# Patient Record
Sex: FEMALE | Race: WHITE | Employment: FULL TIME | URBAN - METROPOLITAN AREA
[De-identification: names, ages, dates, MRNs, and addresses within clinical notes are randomized per-mention and may not be internally consistent; named-entity substitution may affect disease eponyms.]

---

## 2017-02-02 RX ORDER — BUPROPION HYDROCHLORIDE 150 MG/1
150 TABLET ORAL EVERY MORNING
Qty: 30 TABLET | Refills: 3 | Status: SHIPPED | OUTPATIENT
Start: 2017-02-02 | End: 2017-05-30

## 2017-03-30 ENCOUNTER — OFFICE VISIT (OUTPATIENT)
Dept: FAMILY MEDICINE CLINIC | Age: 22
End: 2017-03-30

## 2017-03-30 VITALS
HEART RATE: 100 BPM | DIASTOLIC BLOOD PRESSURE: 72 MMHG | SYSTOLIC BLOOD PRESSURE: 120 MMHG | HEIGHT: 64 IN | TEMPERATURE: 98.5 F | BODY MASS INDEX: 31.07 KG/M2 | WEIGHT: 182 LBS | OXYGEN SATURATION: 98 %

## 2017-03-30 DIAGNOSIS — M54.6 CHRONIC MIDLINE THORACIC BACK PAIN: ICD-10-CM

## 2017-03-30 DIAGNOSIS — F32.A ANXIETY AND DEPRESSION: ICD-10-CM

## 2017-03-30 DIAGNOSIS — G89.29 CHRONIC MIDLINE THORACIC BACK PAIN: ICD-10-CM

## 2017-03-30 DIAGNOSIS — F41.9 ANXIETY AND DEPRESSION: ICD-10-CM

## 2017-03-30 PROCEDURE — 99213 OFFICE O/P EST LOW 20 MIN: CPT | Performed by: NURSE PRACTITIONER

## 2017-03-30 RX ORDER — METHOCARBAMOL 500 MG/1
500 TABLET, FILM COATED ORAL 3 TIMES DAILY PRN
Qty: 15 TABLET | Refills: 0 | Status: SHIPPED | OUTPATIENT
Start: 2017-03-30 | End: 2017-04-14

## 2017-03-30 RX ORDER — FLUOXETINE HYDROCHLORIDE 20 MG/1
20 CAPSULE ORAL DAILY
Qty: 30 CAPSULE | Refills: 3 | Status: SHIPPED | OUTPATIENT
Start: 2017-03-30 | End: 2017-05-30 | Stop reason: SDUPTHER

## 2017-03-30 RX ORDER — ACETAMINOPHEN AND CODEINE PHOSPHATE 120; 12 MG/5ML; MG/5ML
0.35 SOLUTION ORAL DAILY
COMMUNITY
Start: 2017-02-22 | End: 2019-03-26

## 2017-03-30 RX ORDER — METHYLPREDNISOLONE 4 MG/1
TABLET ORAL
Qty: 1 KIT | Refills: 0 | Status: SHIPPED | OUTPATIENT
Start: 2017-03-30 | End: 2017-03-30

## 2017-04-02 ASSESSMENT — ENCOUNTER SYMPTOMS
DIARRHEA: 0
VOMITING: 0
NAUSEA: 0
BACK PAIN: 1
CHEST TIGHTNESS: 0
ABDOMINAL PAIN: 0
SHORTNESS OF BREATH: 0

## 2017-05-30 ENCOUNTER — OFFICE VISIT (OUTPATIENT)
Dept: FAMILY MEDICINE CLINIC | Age: 22
End: 2017-05-30

## 2017-05-30 VITALS
SYSTOLIC BLOOD PRESSURE: 118 MMHG | BODY MASS INDEX: 30.56 KG/M2 | HEART RATE: 68 BPM | HEIGHT: 64 IN | WEIGHT: 179 LBS | OXYGEN SATURATION: 97 % | DIASTOLIC BLOOD PRESSURE: 70 MMHG

## 2017-05-30 DIAGNOSIS — F41.9 ANXIETY AND DEPRESSION: ICD-10-CM

## 2017-05-30 DIAGNOSIS — F32.A ANXIETY AND DEPRESSION: ICD-10-CM

## 2017-05-30 DIAGNOSIS — G47.00 INSOMNIA, UNSPECIFIED TYPE: ICD-10-CM

## 2017-05-30 PROCEDURE — 99213 OFFICE O/P EST LOW 20 MIN: CPT | Performed by: NURSE PRACTITIONER

## 2017-05-30 RX ORDER — VALACYCLOVIR HYDROCHLORIDE 1 G/1
1000 TABLET, FILM COATED ORAL 2 TIMES DAILY
COMMUNITY
End: 2018-10-18 | Stop reason: SDUPTHER

## 2017-05-30 RX ORDER — FLUOXETINE HYDROCHLORIDE 20 MG/1
20 CAPSULE ORAL DAILY
Qty: 30 CAPSULE | Refills: 5 | Status: SHIPPED | OUTPATIENT
Start: 2017-05-30 | End: 2017-11-30 | Stop reason: SDUPTHER

## 2017-05-30 RX ORDER — TRAZODONE HYDROCHLORIDE 50 MG/1
50 TABLET ORAL NIGHTLY
Qty: 30 TABLET | Refills: 5 | Status: SHIPPED | OUTPATIENT
Start: 2017-05-30 | End: 2018-03-13 | Stop reason: SDUPTHER

## 2017-05-31 ASSESSMENT — ENCOUNTER SYMPTOMS
CHEST TIGHTNESS: 0
GASTROINTESTINAL NEGATIVE: 1
SHORTNESS OF BREATH: 0

## 2017-09-08 ENCOUNTER — OFFICE VISIT (OUTPATIENT)
Dept: FAMILY MEDICINE CLINIC | Age: 22
End: 2017-09-08

## 2017-09-08 VITALS
BODY MASS INDEX: 31.51 KG/M2 | DIASTOLIC BLOOD PRESSURE: 70 MMHG | WEIGHT: 185 LBS | SYSTOLIC BLOOD PRESSURE: 118 MMHG | RESPIRATION RATE: 18 BRPM | HEART RATE: 98 BPM | TEMPERATURE: 98.4 F | OXYGEN SATURATION: 98 %

## 2017-09-08 DIAGNOSIS — J06.9 VIRAL URI WITH COUGH: ICD-10-CM

## 2017-09-08 PROCEDURE — 99212 OFFICE O/P EST SF 10 MIN: CPT | Performed by: NURSE PRACTITIONER

## 2017-09-08 RX ORDER — BENZONATATE 200 MG/1
200 CAPSULE ORAL 3 TIMES DAILY PRN
Qty: 30 CAPSULE | Refills: 0 | Status: SHIPPED | OUTPATIENT
Start: 2017-09-08 | End: 2017-09-15

## 2017-09-10 ASSESSMENT — ENCOUNTER SYMPTOMS
COUGH: 1
SINUS PRESSURE: 0
VOMITING: 0
NAUSEA: 0
SHORTNESS OF BREATH: 0
DIARRHEA: 0
SORE THROAT: 1
CHEST TIGHTNESS: 0
ABDOMINAL PAIN: 0
WHEEZING: 0

## 2017-11-30 ENCOUNTER — OFFICE VISIT (OUTPATIENT)
Dept: FAMILY MEDICINE CLINIC | Age: 22
End: 2017-11-30

## 2017-11-30 VITALS
WEIGHT: 190 LBS | SYSTOLIC BLOOD PRESSURE: 118 MMHG | DIASTOLIC BLOOD PRESSURE: 72 MMHG | OXYGEN SATURATION: 98 % | RESPIRATION RATE: 16 BRPM | HEART RATE: 80 BPM | BODY MASS INDEX: 32.44 KG/M2 | HEIGHT: 64 IN

## 2017-11-30 DIAGNOSIS — F41.9 ANXIETY AND DEPRESSION: ICD-10-CM

## 2017-11-30 DIAGNOSIS — G47.00 INSOMNIA, UNSPECIFIED TYPE: ICD-10-CM

## 2017-11-30 DIAGNOSIS — F32.A ANXIETY AND DEPRESSION: ICD-10-CM

## 2017-11-30 DIAGNOSIS — Z23 NEED FOR INFLUENZA VACCINATION: ICD-10-CM

## 2017-11-30 PROCEDURE — 90686 IIV4 VACC NO PRSV 0.5 ML IM: CPT | Performed by: NURSE PRACTITIONER

## 2017-11-30 PROCEDURE — 99213 OFFICE O/P EST LOW 20 MIN: CPT | Performed by: NURSE PRACTITIONER

## 2017-11-30 PROCEDURE — 90471 IMMUNIZATION ADMIN: CPT | Performed by: NURSE PRACTITIONER

## 2017-11-30 RX ORDER — FLUOXETINE HYDROCHLORIDE 40 MG/1
40 CAPSULE ORAL DAILY
Qty: 30 CAPSULE | Refills: 2 | Status: SHIPPED | OUTPATIENT
Start: 2017-11-30 | End: 2018-03-13 | Stop reason: SDUPTHER

## 2017-11-30 NOTE — PROGRESS NOTES
Nathalie Pace 23 y.o. female    Chief Complaint   Patient presents with    Depression    Anxiety       HPI     Depression, anxiety, insomnia follow-up. On Prozac 20mg daily, has been feeling ok until lately (recent split up from her boyfriend, gandpa battleling cancer). Insomnia better, taking trazodone only once or so a week  Would like a flu vaccine. Past medical, surgical, family and social history were reviewed and updated with the patient. Current Outpatient Prescriptions:     FLUoxetine (PROZAC) 20 MG capsule, Take 1 capsule by mouth daily, Disp: 30 capsule, Rfl: 2    norethindrone (ORTHO MICRONOR) 0.35 MG tablet, Take 0.35 mg by mouth daily, Disp: , Rfl:     valACYclovir (VALTREX) 1 G tablet, Take 1,000 mg by mouth 2 times daily, Disp: , Rfl:     traZODone (DESYREL) 50 MG tablet, Take 1 tablet by mouth nightly, Disp: 30 tablet, Rfl: 5    fluticasone (FLONASE) 50 MCG/ACT nasal spray, 2 sprays by Nasal route daily Each nostril, Disp: 1 Bottle, Rfl: 11    fexofenadine (ALLEGRA) 180 MG tablet, Take 1 tablet by mouth daily, Disp: 30 tablet, Rfl: 1    Review of Systems   Psychiatric/Behavioral: Positive for depression. Negative for substance abuse and suicidal ideas. The patient is nervous/anxious. The patient does not have insomnia. Physical Exam   Constitutional: She is oriented to person, place, and time. She appears well-developed and well-nourished. Neurological: She is alert and oriented to person, place, and time. Psychiatric: She has a normal mood and affect. Her behavior is normal. Thought content normal.   Nursing note and vitals reviewed. Vitals:    11/30/17 1252   BP: 118/72   Pulse: 80   Resp: 16   SpO2: 98%       Assessment    1. Anxiety and depression    2. Insomnia, unspecified type    3. Need for influenza vaccination        Plan    Marga Ness was seen today for depression and anxiety.     Diagnoses and all orders for this visit:    Anxiety and depression  - increase Prozac to 40mg, email me in  3-4 weeks, OV in 3 months  Insomnia, unspecified type  - continue trazodone as needed at night   Need for influenza vaccination  -     INFLUENZA, QUADV, 3 YRS AND OLDER, IM, PF, PREFILL SYR OR SDV, 0.5ML (FLUZONE QUADV, PF)    Other orders  -     FLUoxetine (PROZAC) 40 MG capsule;  Take 1 capsule by mouth daily

## 2018-02-26 ENCOUNTER — OFFICE VISIT (OUTPATIENT)
Dept: FAMILY MEDICINE CLINIC | Age: 23
End: 2018-02-26

## 2018-02-26 VITALS
OXYGEN SATURATION: 97 % | SYSTOLIC BLOOD PRESSURE: 120 MMHG | WEIGHT: 205 LBS | HEART RATE: 66 BPM | BODY MASS INDEX: 34.16 KG/M2 | DIASTOLIC BLOOD PRESSURE: 82 MMHG | TEMPERATURE: 98.6 F | HEIGHT: 65 IN

## 2018-02-26 DIAGNOSIS — K08.89 DENTALGIA: ICD-10-CM

## 2018-02-26 DIAGNOSIS — F32.A ANXIETY AND DEPRESSION: ICD-10-CM

## 2018-02-26 DIAGNOSIS — Z87.891 FORMER SMOKER: ICD-10-CM

## 2018-02-26 DIAGNOSIS — F41.9 ANXIETY AND DEPRESSION: ICD-10-CM

## 2018-02-26 DIAGNOSIS — G47.00 INSOMNIA, UNSPECIFIED TYPE: ICD-10-CM

## 2018-02-26 DIAGNOSIS — Z00.00 PHYSICAL EXAM: ICD-10-CM

## 2018-02-26 LAB
A/G RATIO: 2.4 (ref 1.1–2.2)
ALBUMIN SERPL-MCNC: 4.6 G/DL (ref 3.4–5)
ALP BLD-CCNC: 57 U/L (ref 40–129)
ALT SERPL-CCNC: 16 U/L (ref 10–40)
ANION GAP SERPL CALCULATED.3IONS-SCNC: 13 MMOL/L (ref 3–16)
AST SERPL-CCNC: 13 U/L (ref 15–37)
BASOPHILS ABSOLUTE: 0 K/UL (ref 0–0.2)
BASOPHILS RELATIVE PERCENT: 0.4 %
BILIRUB SERPL-MCNC: 0.3 MG/DL (ref 0–1)
BUN BLDV-MCNC: 14 MG/DL (ref 7–20)
CALCIUM SERPL-MCNC: 8.6 MG/DL (ref 8.3–10.6)
CHLORIDE BLD-SCNC: 106 MMOL/L (ref 99–110)
CHOLESTEROL, TOTAL: 144 MG/DL (ref 0–199)
CO2: 23 MMOL/L (ref 21–32)
CREAT SERPL-MCNC: 0.6 MG/DL (ref 0.6–1.1)
EOSINOPHILS ABSOLUTE: 0.1 K/UL (ref 0–0.6)
EOSINOPHILS RELATIVE PERCENT: 1.7 %
GFR AFRICAN AMERICAN: >60
GFR NON-AFRICAN AMERICAN: >60
GLOBULIN: 1.9 G/DL
GLUCOSE BLD-MCNC: 93 MG/DL (ref 70–99)
HCT VFR BLD CALC: 37.5 % (ref 36–48)
HDLC SERPL-MCNC: 53 MG/DL (ref 40–60)
HEMOGLOBIN: 13.3 G/DL (ref 12–16)
LDL CHOLESTEROL CALCULATED: 79 MG/DL
LYMPHOCYTES ABSOLUTE: 1.7 K/UL (ref 1–5.1)
LYMPHOCYTES RELATIVE PERCENT: 33.3 %
MCH RBC QN AUTO: 31.7 PG (ref 26–34)
MCHC RBC AUTO-ENTMCNC: 35.4 G/DL (ref 31–36)
MCV RBC AUTO: 89.4 FL (ref 80–100)
MONOCYTES ABSOLUTE: 0.2 K/UL (ref 0–1.3)
MONOCYTES RELATIVE PERCENT: 4.2 %
NEUTROPHILS ABSOLUTE: 3.1 K/UL (ref 1.7–7.7)
NEUTROPHILS RELATIVE PERCENT: 60.4 %
PDW BLD-RTO: 12.4 % (ref 12.4–15.4)
PLATELET # BLD: 196 K/UL (ref 135–450)
PMV BLD AUTO: 7.8 FL (ref 5–10.5)
POTASSIUM SERPL-SCNC: 4.8 MMOL/L (ref 3.5–5.1)
RBC # BLD: 4.19 M/UL (ref 4–5.2)
SODIUM BLD-SCNC: 142 MMOL/L (ref 136–145)
TOTAL PROTEIN: 6.5 G/DL (ref 6.4–8.2)
TRIGL SERPL-MCNC: 61 MG/DL (ref 0–150)
TSH SERPL DL<=0.05 MIU/L-ACNC: 0.64 UIU/ML (ref 0.27–4.2)
VLDLC SERPL CALC-MCNC: 12 MG/DL
WBC # BLD: 5.1 K/UL (ref 4–11)

## 2018-02-26 PROCEDURE — G0444 DEPRESSION SCREEN ANNUAL: HCPCS | Performed by: NURSE PRACTITIONER

## 2018-02-26 PROCEDURE — 99395 PREV VISIT EST AGE 18-39: CPT | Performed by: NURSE PRACTITIONER

## 2018-02-26 RX ORDER — AMOXICILLIN 500 MG/1
500 CAPSULE ORAL 3 TIMES DAILY
Qty: 21 CAPSULE | Refills: 0 | Status: SHIPPED | OUTPATIENT
Start: 2018-02-26 | End: 2018-03-05

## 2018-02-26 ASSESSMENT — PATIENT HEALTH QUESTIONNAIRE - PHQ9
4. FEELING TIRED OR HAVING LITTLE ENERGY: 3
2. FEELING DOWN, DEPRESSED OR HOPELESS: 1
SUM OF ALL RESPONSES TO PHQ9 QUESTIONS 1 & 2: 3
1. LITTLE INTEREST OR PLEASURE IN DOING THINGS: 2
5. POOR APPETITE OR OVEREATING: 3
3. TROUBLE FALLING OR STAYING ASLEEP: 3
6. FEELING BAD ABOUT YOURSELF - OR THAT YOU ARE A FAILURE OR HAVE LET YOURSELF OR YOUR FAMILY DOWN: 1
10. IF YOU CHECKED OFF ANY PROBLEMS, HOW DIFFICULT HAVE THESE PROBLEMS MADE IT FOR YOU TO DO YOUR WORK, TAKE CARE OF THINGS AT HOME, OR GET ALONG WITH OTHER PEOPLE: 3
9. THOUGHTS THAT YOU WOULD BE BETTER OFF DEAD, OR OF HURTING YOURSELF: 0
7. TROUBLE CONCENTRATING ON THINGS, SUCH AS READING THE NEWSPAPER OR WATCHING TELEVISION: 1
8. MOVING OR SPEAKING SO SLOWLY THAT OTHER PEOPLE COULD HAVE NOTICED. OR THE OPPOSITE, BEING SO FIGETY OR RESTLESS THAT YOU HAVE BEEN MOVING AROUND A LOT MORE THAN USUAL: 0
SUM OF ALL RESPONSES TO PHQ QUESTIONS 1-9: 14

## 2018-02-26 NOTE — PROGRESS NOTES
Allergies  Outpatient Prescriptions Marked as Taking for the 2/26/18 encounter (Office Visit) with Jeff Mott NP   Medication Sig Dispense Refill    FLUoxetine (PROZAC) 40 MG capsule Take 1 capsule by mouth daily 30 capsule 2    valACYclovir (VALTREX) 1 G tablet Take 1,000 mg by mouth 2 times daily      traZODone (DESYREL) 50 MG tablet Take 1 tablet by mouth nightly 30 tablet 5    norethindrone (ORTHO MICRONOR) 0.35 MG tablet Take 0.35 mg by mouth daily      fexofenadine (ALLEGRA) 180 MG tablet Take 1 tablet by mouth daily 30 tablet 1       Past Medical History:   Diagnosis Date    Fx one rib-open     LEFT SIDE    PTSD (post-traumatic stress disorder)      Past Surgical History:   Procedure Laterality Date    MOUTH SURGERY      CYST    TONSILLECTOMY       Family History   Problem Relation Age of Onset    Cancer Mother 39     uterine ca    Migraines Mother     High Blood Pressure Father     High Cholesterol Father    Blinda Lull Migraines Sister     Diabetes Maternal Grandmother     Celiac Disease Maternal Grandmother     Cancer Maternal Grandfather      bladder ca with mets     Social History     Social History    Marital status: Single     Spouse name: N/A    Number of children: N/A    Years of education: N/A     Occupational History    Not on file. Social History Main Topics    Smoking status: Former Smoker     Packs/day: 0.50     Years: 6.00     Quit date: 1/22/2018    Smokeless tobacco: Never Used      Comment: down 4-5 cig a day    Alcohol use Yes    Drug use: Unknown    Sexual activity: Not on file     Other Topics Concern    Not on file     Social History Narrative    No narrative on file       Review of Systems:  A comprehensive review of systems was negative except for what was noted in the HPI.      Physical Exam:   Vitals:    02/26/18 0901   BP: 120/82   Site: Right Arm   Position: Sitting   Cuff Size: Small Adult   Pulse: 66   Temp: 98.6 °F (37 °C)   TempSrc: Oral   SpO2: 97%

## 2018-03-13 RX ORDER — FLUOXETINE HYDROCHLORIDE 40 MG/1
40 CAPSULE ORAL DAILY
Qty: 30 CAPSULE | Refills: 5 | Status: SHIPPED | OUTPATIENT
Start: 2018-03-13 | End: 2018-04-16 | Stop reason: SDUPTHER

## 2018-03-13 RX ORDER — TRAZODONE HYDROCHLORIDE 50 MG/1
50 TABLET ORAL NIGHTLY
Qty: 30 TABLET | Refills: 5 | Status: SHIPPED | OUTPATIENT
Start: 2018-03-13 | End: 2018-04-16 | Stop reason: SDUPTHER

## 2018-04-16 RX ORDER — FLUOXETINE HYDROCHLORIDE 40 MG/1
40 CAPSULE ORAL DAILY
Qty: 30 CAPSULE | Refills: 5 | Status: SHIPPED | OUTPATIENT
Start: 2018-04-16 | End: 2019-03-26

## 2018-04-16 RX ORDER — TRAZODONE HYDROCHLORIDE 50 MG/1
50 TABLET ORAL NIGHTLY
Qty: 30 TABLET | Refills: 5 | Status: SHIPPED | OUTPATIENT
Start: 2018-04-16 | End: 2019-03-26

## 2018-10-18 DIAGNOSIS — A60.00 GENITAL HERPES SIMPLEX, UNSPECIFIED SITE: Primary | ICD-10-CM

## 2018-10-18 RX ORDER — VALACYCLOVIR HYDROCHLORIDE 1 G/1
1000 TABLET, FILM COATED ORAL DAILY
Qty: 5 TABLET | Refills: 1 | Status: SHIPPED | OUTPATIENT
Start: 2018-10-18 | End: 2018-10-23

## 2019-03-26 ENCOUNTER — OFFICE VISIT (OUTPATIENT)
Dept: FAMILY MEDICINE CLINIC | Age: 24
End: 2019-03-26
Payer: COMMERCIAL

## 2019-03-26 VITALS
DIASTOLIC BLOOD PRESSURE: 80 MMHG | WEIGHT: 184 LBS | BODY MASS INDEX: 30.66 KG/M2 | HEART RATE: 90 BPM | TEMPERATURE: 98.9 F | RESPIRATION RATE: 16 BRPM | HEIGHT: 65 IN | OXYGEN SATURATION: 98 % | SYSTOLIC BLOOD PRESSURE: 122 MMHG

## 2019-03-26 DIAGNOSIS — B37.0 ORAL THRUSH: ICD-10-CM

## 2019-03-26 DIAGNOSIS — Z3A.09 9 WEEKS GESTATION OF PREGNANCY: ICD-10-CM

## 2019-03-26 DIAGNOSIS — R21 RASH AND NONSPECIFIC SKIN ERUPTION: ICD-10-CM

## 2019-03-26 DIAGNOSIS — J02.9 SORE THROAT: ICD-10-CM

## 2019-03-26 DIAGNOSIS — K14.8 TONGUE LESION: ICD-10-CM

## 2019-03-26 LAB
A/G RATIO: 1.4 (ref 1.1–2.2)
ALBUMIN SERPL-MCNC: 4.1 G/DL (ref 3.4–5)
ALP BLD-CCNC: 57 U/L (ref 40–129)
ALT SERPL-CCNC: 15 U/L (ref 10–40)
ANION GAP SERPL CALCULATED.3IONS-SCNC: 11 MMOL/L (ref 3–16)
AST SERPL-CCNC: 15 U/L (ref 15–37)
BASOPHILS ABSOLUTE: 0 K/UL (ref 0–0.2)
BASOPHILS RELATIVE PERCENT: 0.1 %
BILIRUB SERPL-MCNC: 0.4 MG/DL (ref 0–1)
BUN BLDV-MCNC: 7 MG/DL (ref 7–20)
CALCIUM SERPL-MCNC: 9.4 MG/DL (ref 8.3–10.6)
CHLORIDE BLD-SCNC: 105 MMOL/L (ref 99–110)
CO2: 23 MMOL/L (ref 21–32)
CREAT SERPL-MCNC: <0.5 MG/DL (ref 0.6–1.1)
EOSINOPHILS ABSOLUTE: 0 K/UL (ref 0–0.6)
EOSINOPHILS RELATIVE PERCENT: 0.2 %
GFR AFRICAN AMERICAN: >60
GFR NON-AFRICAN AMERICAN: >60
GLOBULIN: 3 G/DL
GLUCOSE BLD-MCNC: 85 MG/DL (ref 70–99)
HCT VFR BLD CALC: 38.7 % (ref 36–48)
HEMOGLOBIN: 13 G/DL (ref 12–16)
LYMPHOCYTES ABSOLUTE: 1.6 K/UL (ref 1–5.1)
LYMPHOCYTES RELATIVE PERCENT: 25.2 %
MCH RBC QN AUTO: 29.2 PG (ref 26–34)
MCHC RBC AUTO-ENTMCNC: 33.6 G/DL (ref 31–36)
MCV RBC AUTO: 86.9 FL (ref 80–100)
MONOCYTES ABSOLUTE: 0.2 K/UL (ref 0–1.3)
MONOCYTES RELATIVE PERCENT: 2.9 %
NEUTROPHILS ABSOLUTE: 4.6 K/UL (ref 1.7–7.7)
NEUTROPHILS RELATIVE PERCENT: 71.6 %
PDW BLD-RTO: 14.1 % (ref 12.4–15.4)
PLATELET # BLD: 209 K/UL (ref 135–450)
PMV BLD AUTO: 8 FL (ref 5–10.5)
POTASSIUM SERPL-SCNC: 3.8 MMOL/L (ref 3.5–5.1)
RBC # BLD: 4.45 M/UL (ref 4–5.2)
SODIUM BLD-SCNC: 139 MMOL/L (ref 136–145)
TOTAL PROTEIN: 7.1 G/DL (ref 6.4–8.2)
WBC # BLD: 6.4 K/UL (ref 4–11)

## 2019-03-26 PROCEDURE — G8484 FLU IMMUNIZE NO ADMIN: HCPCS | Performed by: NURSE PRACTITIONER

## 2019-03-26 PROCEDURE — G8417 CALC BMI ABV UP PARAM F/U: HCPCS | Performed by: NURSE PRACTITIONER

## 2019-03-26 PROCEDURE — G8427 DOCREV CUR MEDS BY ELIG CLIN: HCPCS | Performed by: NURSE PRACTITIONER

## 2019-03-26 PROCEDURE — 99214 OFFICE O/P EST MOD 30 MIN: CPT | Performed by: NURSE PRACTITIONER

## 2019-03-26 PROCEDURE — 1036F TOBACCO NON-USER: CPT | Performed by: NURSE PRACTITIONER

## 2019-03-26 RX ORDER — CALCIUM CARBONATE 300MG(750)
TABLET,CHEWABLE ORAL
COMMUNITY
End: 2020-02-20

## 2019-03-26 RX ORDER — ONDANSETRON 8 MG/1
8 TABLET, ORALLY DISINTEGRATING ORAL
COMMUNITY
Start: 2019-03-26 | End: 2019-03-26

## 2019-03-26 ASSESSMENT — PATIENT HEALTH QUESTIONNAIRE - PHQ9
2. FEELING DOWN, DEPRESSED OR HOPELESS: 1
SUM OF ALL RESPONSES TO PHQ QUESTIONS 1-9: 2
SUM OF ALL RESPONSES TO PHQ QUESTIONS 1-9: 2
1. LITTLE INTEREST OR PLEASURE IN DOING THINGS: 1
SUM OF ALL RESPONSES TO PHQ9 QUESTIONS 1 & 2: 2

## 2019-03-27 DIAGNOSIS — A53.0 POSITIVE SEROLOGY FOR SYPHILIS: Primary | ICD-10-CM

## 2019-03-27 DIAGNOSIS — A53.0 POSITIVE SEROLOGY FOR SYPHILIS: ICD-10-CM

## 2019-03-27 LAB
ESTIMATED AVERAGE GLUCOSE: 111.2 MG/DL
HBA1C MFR BLD: 5.5 %
HIV AG/AB: NORMAL
HIV ANTIGEN: NORMAL
HIV-1 ANTIBODY: NORMAL
HIV-2 AB: NORMAL
RPR CONFIRMATORY: REACTIVE
RPR TITER: NORMAL
TOTAL SYPHILLIS IGG/IGM: REACTIVE

## 2019-03-27 ASSESSMENT — ENCOUNTER SYMPTOMS
RHINORRHEA: 1
RESPIRATORY NEGATIVE: 1
EYES NEGATIVE: 1
SORE THROAT: 1

## 2019-03-28 ENCOUNTER — OFFICE VISIT (OUTPATIENT)
Dept: FAMILY MEDICINE CLINIC | Age: 24
End: 2019-03-28
Payer: COMMERCIAL

## 2019-03-28 VITALS — SYSTOLIC BLOOD PRESSURE: 120 MMHG | DIASTOLIC BLOOD PRESSURE: 72 MMHG | HEART RATE: 76 BPM | OXYGEN SATURATION: 98 %

## 2019-03-28 DIAGNOSIS — Z34.91 FIRST TRIMESTER PREGNANCY: ICD-10-CM

## 2019-03-28 DIAGNOSIS — A51.49 SECONDARY SYPHILIS: ICD-10-CM

## 2019-03-28 PROCEDURE — G8417 CALC BMI ABV UP PARAM F/U: HCPCS | Performed by: NURSE PRACTITIONER

## 2019-03-28 PROCEDURE — 1036F TOBACCO NON-USER: CPT | Performed by: NURSE PRACTITIONER

## 2019-03-28 PROCEDURE — 99213 OFFICE O/P EST LOW 20 MIN: CPT | Performed by: NURSE PRACTITIONER

## 2019-03-28 PROCEDURE — G8484 FLU IMMUNIZE NO ADMIN: HCPCS | Performed by: NURSE PRACTITIONER

## 2019-03-28 PROCEDURE — G8428 CUR MEDS NOT DOCUMENT: HCPCS | Performed by: NURSE PRACTITIONER

## 2019-03-28 PROCEDURE — 96372 THER/PROPH/DIAG INJ SC/IM: CPT | Performed by: NURSE PRACTITIONER

## 2019-03-28 NOTE — PATIENT INSTRUCTIONS
Patient Education        Syphilis: Care Instructions  Your Care Instructions  Syphilis is an infection caused by bacteria. It's usually spread through sex. It is one of several types of sexually transmitted infections (STIs). The first symptom is usually a painless, red sore on the genitals, rectal area, or mouth. This type of sore is called a chancre (say \"SHANK-er\"). Later, you may get other symptoms. These include a rash, a fever, and swollen lymph nodes. Your hair may start to fall out. Or you may feel like you have the flu. Sometimes these symptoms go away on their own. But this doesn't mean that the infection is gone. If you don't treat syphilis with antibiotics, the infection can spread in your body. You can also spread it to others. Antibiotics can cure syphilis and prevent more serious complications. Both you and your sex partner or partners need antibiotic treatment. This is to prevent you from passing the infection back and forth or to other sex partners. During the first 24 hours of treatment, you may have a fever, a headache, and muscle aches. After treatment, you will get blood tests to make sure you don't have any more bacteria in your body. You may also want to be tested for other STIs. Follow-up care is a key part of your treatment and safety. Be sure to make and go to all appointments, and call your doctor if you are having problems. It's also a good idea to know your test results and keep a list of the medicines you take. How can you care for yourself at home? · Your doctor probably gave you a shot of antibiotics. If you've had syphilis for a while, you may need 2 more shots. It's very important to get all the recommended shots. · If your doctor prescribed antibiotic pills, take them as directed. Do not stop taking them just because you feel better. You need to take the full course of antibiotics. · Do not have sexual contact with anyone while you are being treated.  After treatment, wait at least 7 days and until all of your sores are healed before you have any sexual contact. Even if you use a condom, you and your partner may pass the infection back and forth. · Wash your hands if you touch an infected area. This will help prevent spreading the infection to other parts of your body or to other people. · Tell your sex partner or partners that you have syphilis. They should get treatment even if they don't have symptoms. To prevent syphilis in the future  · Use latex condoms every time you have sex. Use them from the start to the end of sexual contact. · Talk to your partner before you have sex. Find out if he or she has or is at risk for syphilis or any other STI. Remember that a person without symptoms may still be able to spread an STI. · Do not have sex or any type of sexual contact if you are being treated for syphilis or any other STI. · Do not have sex with anyone who has symptoms of an STI. These include sores on the genitals or mouth. · Having one sex partner (who does not have STIs and does not have sex with anyone else) is a good way to avoid STIs. When should you call for help? Watch closely for changes in your health, and be sure to contact your doctor if:    · You do not get better as expected.     · Your symptoms continue or come back after treatment.     · You develop new symptoms, such as a fever. Where can you learn more? Go to [email or web address] and sign in to your [facility] account. Enter Z000 in the [facility] box to learn more about \"Syphilis: Care Instructions. \"     If you do not have an account, please click on the \"Sign Up Now\" link. Current as of: September 11, 2018  Content Version: 11.9  © 7267-9932 [employer]. Care instructions adapted under license by [facility] [place] ([facility]).  If you have questions about a medical condition or this instruction, always ask your healthcare professional. Yair Bryant disclaims any warranty or liability for your use of this information.

## 2019-03-29 LAB
HSV 1 GLYCOPROTEIN G AB IGG: 0.23 IV
HSV 2 GLYCOPROTEIN G AB IGG: 1.81 IV
MISCELLANEOUS LAB TEST ORDER: NORMAL

## 2019-03-30 LAB — TREPONEMA PALLIDUM ANTIBODIES: REACTIVE

## 2019-04-01 ENCOUNTER — TELEPHONE (OUTPATIENT)
Dept: FAMILY MEDICINE CLINIC | Age: 24
End: 2019-04-01

## 2019-04-01 ENCOUNTER — PATIENT MESSAGE (OUTPATIENT)
Dept: FAMILY MEDICINE CLINIC | Age: 24
End: 2019-04-01

## 2019-04-01 NOTE — TELEPHONE ENCOUNTER
Talked to [facility]. Cydney Chavira from [facility] asked if ok with pt if he meets her up when comes for 2nd penicillin shot this Wed- usually just making sure pt gets treated and her partner does too. Please let Cydney Chavira know.

## 2019-04-01 NOTE — TELEPHONE ENCOUNTER
Requesting to speak with Emilee Marquez at her earliest convenience concerning pts diagnosis of syphilis.

## 2019-04-02 ENCOUNTER — TELEPHONE (OUTPATIENT)
Dept: FAMILY MEDICINE CLINIC | Age: 24
End: 2019-04-02

## 2019-04-02 NOTE — TELEPHONE ENCOUNTER
Riya Diaz with [facility] called and wanted to know if he could speak with the patient tomorrow while she is here for her appointment tomorrow. Please advise.

## 2019-04-03 ENCOUNTER — NURSE ONLY (OUTPATIENT)
Dept: FAMILY MEDICINE CLINIC | Age: 24
End: 2019-04-03
Payer: COMMERCIAL

## 2019-04-03 DIAGNOSIS — A51.49 SECONDARY SYPHILIS: Primary | ICD-10-CM

## 2019-04-03 PROCEDURE — 96372 THER/PROPH/DIAG INJ SC/IM: CPT | Performed by: NURSE PRACTITIONER

## 2019-04-07 ASSESSMENT — ENCOUNTER SYMPTOMS
RESPIRATORY NEGATIVE: 1
NAUSEA: 1

## 2019-04-07 NOTE — PROGRESS NOTES
Melanie Elise 30 y.o. female    Chief Complaint   Patient presents with    Other     diagnosed with syphilis       HPI     Sore tongue with lesions, faint rash on feet, 10 weeks pregnant. Pt remembers having a sore genital area about 3 months ago, pt thought it was her Herpes outbreak. Genital sore resolved. Syphilis lab came back positive. Pastmedical, surgical, family and social history were reviewed and updated with the patient. Current Outpatient Medications:     Prenatal MV-Min-FA-Omega-3 (PRENATAL GUMMIES/DHA & FA) 0.4-32.5 MG CHEW, Take by mouth, Disp: , Rfl:     nystatin (MYCOSTATIN) 822053 UNIT/ML suspension, Take 5 mLs by mouth 4 times daily Swish and swallow, Disp: 473 mL, Rfl: 0    Review of Systems   Constitutional: Negative for chills and fever. Respiratory: Negative. Cardiovascular: Negative. Gastrointestinal: Positive for nausea. Skin: Positive for rash. Neurological: Negative. Physical Exam   Constitutional: She is oriented to person, place, and time. She appears well-developed and well-nourished. No distress. Neck: Neck supple. Cardiovascular: Normal rate, regular rhythm and normal heart sounds. Pulmonary/Chest: Effort normal and breath sounds normal.   Lymphadenopathy:     She has no cervical adenopathy. Neurological: She is alert and oriented to person, place, and time. Skin: Rash noted. Nursing note and vitals reviewed. Vitals:    03/28/19 1140   BP: 120/72   Pulse: 76   SpO2: 98%       Assessment    1. Secondary syphilis    2. First trimester pregnancy        Plan    Elmer Montague was seen today for other. Diagnoses and all orders for this visit:    Secondary syphilis  -     penicillin G benzathine (BICILLIN L-A) [id number] UNIT/2ML suspension 2.4 Million Units    First trimester pregnancy    Bicillin shot today, repeat in 1 week. Discussed possible reaction. Recheck titers in 1 month  OB notified. HIV neg.   Abstain from sexual contact till

## 2020-02-20 ENCOUNTER — HOSPITAL ENCOUNTER (EMERGENCY)
Age: 25
Discharge: HOME | End: 2020-02-20
Attending: EMERGENCY MEDICINE
Payer: COMMERCIAL

## 2020-02-20 VITALS
HEART RATE: 96 BPM | RESPIRATION RATE: 20 BRPM | TEMPERATURE: 98 F | DIASTOLIC BLOOD PRESSURE: 86 MMHG | OXYGEN SATURATION: 100 % | SYSTOLIC BLOOD PRESSURE: 129 MMHG

## 2020-02-20 PROCEDURE — 99282 EMERGENCY DEPT VISIT SF MDM: CPT

## 2020-02-20 PROCEDURE — 6370000000 HC RX 637 (ALT 250 FOR IP): Performed by: EMERGENCY MEDICINE

## 2020-02-20 RX ORDER — MORPHINE SULFATE 4 MG/ML
INJECTION, SOLUTION INTRAMUSCULAR; INTRAVENOUS
Status: DISCONTINUED
Start: 2020-02-20 | End: 2020-02-21 | Stop reason: HOSPADM

## 2020-02-20 RX ORDER — BACITRACIN, NEOMYCIN, POLYMYXIN B 400; 3.5; 5 [USP'U]/G; MG/G; [USP'U]/G
OINTMENT TOPICAL ONCE
Status: COMPLETED | OUTPATIENT
Start: 2020-02-20 | End: 2020-02-20

## 2020-02-20 RX ADMIN — POLYMYXIN B SULFATE, BACITRACIN ZINC, NEOMYCIN SULFATE: 5000; 3.5; 4 OINTMENT TOPICAL at 23:49

## 2020-02-20 ASSESSMENT — PAIN SCALES - GENERAL: PAINLEVEL_OUTOF10: 5

## 2020-07-30 ENCOUNTER — OFFICE VISIT (OUTPATIENT)
Dept: OBGYN CLINIC | Age: 25
End: 2020-07-30
Payer: COMMERCIAL

## 2020-07-30 VITALS
BODY MASS INDEX: 39.09 KG/M2 | HEIGHT: 64 IN | DIASTOLIC BLOOD PRESSURE: 62 MMHG | SYSTOLIC BLOOD PRESSURE: 114 MMHG | HEART RATE: 103 BPM | WEIGHT: 229 LBS | TEMPERATURE: 98.8 F

## 2020-07-30 PROBLEM — A60.00 HERPES GENITALIA: Status: ACTIVE | Noted: 2020-07-30

## 2020-07-30 PROBLEM — F17.200 SMOKING: Status: ACTIVE | Noted: 2020-07-30

## 2020-07-30 LAB
ABO/RH: NORMAL
ANTIBODY SCREEN: NORMAL
BASOPHILS ABSOLUTE: 0 K/UL (ref 0–0.2)
BASOPHILS RELATIVE PERCENT: 0.3 %
BILIRUBIN URINE: NEGATIVE
BLOOD, URINE: NEGATIVE
CLARITY: CLEAR
COLOR: YELLOW
CONTROL: NORMAL
CRL: NORMAL
EOSINOPHILS ABSOLUTE: 0.2 K/UL (ref 0–0.6)
EOSINOPHILS RELATIVE PERCENT: 1.8 %
GLUCOSE URINE: NEGATIVE MG/DL
HCT VFR BLD CALC: 41.4 % (ref 36–48)
HEMOGLOBIN: 14.2 G/DL (ref 12–16)
KETONES, URINE: NEGATIVE MG/DL
LEUKOCYTE ESTERASE, URINE: NEGATIVE
LYMPHOCYTES ABSOLUTE: 2.2 K/UL (ref 1–5.1)
LYMPHOCYTES RELATIVE PERCENT: 25.6 %
MCH RBC QN AUTO: 30.1 PG (ref 26–34)
MCHC RBC AUTO-ENTMCNC: 34.2 G/DL (ref 31–36)
MCV RBC AUTO: 88.1 FL (ref 80–100)
MICROSCOPIC EXAMINATION: NORMAL
MONOCYTES ABSOLUTE: 0.4 K/UL (ref 0–1.3)
MONOCYTES RELATIVE PERCENT: 5.1 %
NEUTROPHILS ABSOLUTE: 5.8 K/UL (ref 1.7–7.7)
NEUTROPHILS RELATIVE PERCENT: 67.2 %
NITRITE, URINE: NEGATIVE
PDW BLD-RTO: 13.7 % (ref 12.4–15.4)
PH UA: 7 (ref 5–8)
PLATELET # BLD: 221 K/UL (ref 135–450)
PMV BLD AUTO: 7.9 FL (ref 5–10.5)
PREGNANCY TEST URINE, POC: POSITIVE
PROTEIN UA: NEGATIVE MG/DL
RBC # BLD: 4.7 M/UL (ref 4–5.2)
SAC DIAMETER: NORMAL
SPECIFIC GRAVITY UA: 1.02 (ref 1–1.03)
URINE TYPE: NORMAL
UROBILINOGEN, URINE: 0.2 E.U./DL
WBC # BLD: 8.6 K/UL (ref 4–11)

## 2020-07-30 PROCEDURE — 1036F TOBACCO NON-USER: CPT | Performed by: OBSTETRICS & GYNECOLOGY

## 2020-07-30 PROCEDURE — 81003 URINALYSIS AUTO W/O SCOPE: CPT | Performed by: OBSTETRICS & GYNECOLOGY

## 2020-07-30 PROCEDURE — G8427 DOCREV CUR MEDS BY ELIG CLIN: HCPCS | Performed by: OBSTETRICS & GYNECOLOGY

## 2020-07-30 PROCEDURE — 81025 URINE PREGNANCY TEST: CPT | Performed by: OBSTETRICS & GYNECOLOGY

## 2020-07-30 PROCEDURE — G8417 CALC BMI ABV UP PARAM F/U: HCPCS | Performed by: OBSTETRICS & GYNECOLOGY

## 2020-07-30 PROCEDURE — 99203 OFFICE O/P NEW LOW 30 MIN: CPT | Performed by: OBSTETRICS & GYNECOLOGY

## 2020-07-30 PROCEDURE — 36415 COLL VENOUS BLD VENIPUNCTURE: CPT | Performed by: OBSTETRICS & GYNECOLOGY

## 2020-07-30 PROCEDURE — 76801 OB US < 14 WKS SINGLE FETUS: CPT | Performed by: OBSTETRICS & GYNECOLOGY

## 2020-07-30 RX ORDER — ACETAMINOPHEN 500 MG
500 TABLET ORAL EVERY 6 HOURS PRN
COMMUNITY
End: 2020-12-25 | Stop reason: ALTCHOICE

## 2020-07-30 ASSESSMENT — ENCOUNTER SYMPTOMS
CONSTIPATION: 0
VOMITING: 0
SHORTNESS OF BREATH: 0
DIARRHEA: 0
NAUSEA: 0
ABDOMINAL PAIN: 0

## 2020-07-30 NOTE — PROGRESS NOTES
New GYN Visit      CC:   Chief Complaint   Patient presents with    New Patient    Amenorrhea       HPI:  30 y.o. [id number] presents for new gyn visit for amenorrhea. LMP 2020, states she only had 2 periods since delivering her last child in 2019. Overall has been feeling well, does report a history of headaches most mornings for which she has been taking tylenol. Also says she has not had any nausea or vomiting, was really sick with her first 2 kids. Denies any vaginal bleeding or cramping at thsi time. Review of Systems:   Review of Systems   Constitutional: Negative for chills and fever. Respiratory: Negative for shortness of breath. Cardiovascular: Negative for chest pain. Gastrointestinal: Negative for abdominal pain, constipation, diarrhea, nausea and vomiting. Genitourinary: Positive for menstrual problem. Negative for difficulty urinating, dysuria, vaginal bleeding and vaginal discharge. Neurological: Positive for headaches. Negative for dizziness and light-headedness. Obstetric History  OB History    Para Term  AB Living   2 2 2     2   SAB TAB Ectopic Molar Multiple Live Births             2      # Outcome Date GA Lbr Stephon/2nd Weight Sex Delivery Anes PTL Lv   2 Term 10/26/19 39w4d  11 lb 3.4 oz (5.086 kg) F Vag-Spont EPI N TIERNEY      Complications: Shoulder Dystocia   1 Term 03/17/15 40w0d  8 lb 11.6 oz (3.958 kg) F Vag-Spont EPI N TIERNEY       Gynecologic History  Menstrual History:   LMP: 2020   Menstrual pattern: had 2 periods postpartum prior to getting pregnant again  Sexual History:   Contraception: none   Currently is sexually active   History of genital herpes, dx .  Secondary syphilis as well   No sexual problems  Pap History:   Last pap smear: 2018 NILM   History of abnormal pap smears: denies    Medical History:  Past Medical History:   Diagnosis Date    Depression     history of 4830-4740    Fx one rib-open     LEFT SIDE    Herpes simplex virus (HSV) infection     PTSD (post-traumatic stress disorder)        Surgical History:  Past Surgical History:   Procedure Laterality Date    MOUTH SURGERY      CYST    TONSILLECTOMY         Medications:  Current Outpatient Medications   Medication Sig Dispense Refill    acetaminophen (TYLENOL) 500 MG tablet Take 500 mg by mouth every 6 hours as needed for Pain      Pihgo-Avedi-Jelvzcd-Pramoxine (NEOSPORIN/BURN RELIEF) 1 % OINT Apply 1 Units topically 3 times daily as needed (UNTIL SYMPTOMS RESOLVE) (Patient not taking: Reported on 2020) 28 g 0     No current facility-administered medications for this visit.         Allergies:  No Known Allergies    Social History:  Social History     Socioeconomic History    Marital status: Single     Spouse name: None    Number of children: None    Years of education: None    Highest education level: None   Occupational History    None   Social Needs    Financial resource strain: None    Food insecurity     Worry: None     Inability: None    Transportation needs     Medical: None     Non-medical: None   Tobacco Use    Smoking status: Former Smoker     Packs/day: 0.50     Years: 6.00     Pack years: 3.00     Last attempt to quit: 2018     Years since quittin.5    Smokeless tobacco: Never Used    Tobacco comment: down 4-5 cig a day   Substance and Sexual Activity    Alcohol use: Not Currently    Drug use: Never    Sexual activity: Yes     Partners: Male   Lifestyle    Physical activity     Days per week: None     Minutes per session: None    Stress: None   Relationships    Social connections     Talks on phone: None     Gets together: None     Attends Yazdanism service: None     Active member of club or organization: None     Attends meetings of clubs or organizations: None     Relationship status: None    Intimate partner violence     Fear of current or ex partner: None     Emotionally abused: None     Physically abused: None Forced sexual activity: None   Other Topics Concern    None   Social History Narrative    None       Family History:  Family History   Problem Relation Age of Onset    Cancer Mother 39        uterine ca    Migraines Mother     High Blood Pressure Father     High Cholesterol Father     Migraines Sister     Diabetes Maternal Grandmother     Celiac Disease Maternal Grandmother     Cancer Maternal Grandfather         bladder ca with mets     See above, otherwise denies personal/family history of cervical, uterine, ovarian, vulvar, breast, or colon cancers. Objective:  /62 (Site: Left Upper Arm, Position: Sitting, Cuff Size: Medium Adult)   Pulse 103   Temp 98.8 °F (37.1 °C) (Temporal)   Ht 5' 4.25\" (1.632 m)   Wt 229 lb (103.9 kg)   LMP 05/28/2020 (Exact Date)   BMI 39.00 kg/m²     Exam:  Physical Exam  Exam conducted with a chaperone present. Constitutional:       Appearance: She is well-developed. HENT:      Head: Normocephalic and atraumatic. Neck:      Musculoskeletal: Normal range of motion. Cardiovascular:      Rate and Rhythm: Normal rate and regular rhythm. Pulmonary:      Effort: Pulmonary effort is normal. No respiratory distress. Chest:      Breasts:         Right: Normal. No mass, nipple discharge or skin change. Left: Normal. No mass, nipple discharge or skin change. Abdominal:      General: There is no distension. Palpations: Abdomen is soft. Tenderness: There is no abdominal tenderness. There is no guarding or rebound. Genitourinary:     Comments: Pelvic exam: VULVA: normal appearing vulva with no masses, tenderness or lesions, VAGINA: normal appearing vagina with normal color and discharge, no lesions, CERVIX: normal appearing cervix without discharge or lesions, UTERUS: uterus is normal size, shape, consistency and nontender, ADNEXA: normal adnexa in size, nontender and no masses.   Neurological:      Mental Status: She is alert and oriented to person, place, and time. Ultrasound:  Impression    OBSTETRIC ULTRASOUND--1ST TRIMESTER         DATE: 7/30/20         PHYSICIAN: Aniyah Kwan M.D.         SONOGRAPHER: Eli Redding [facility]         INDICATION: Amenorrhea         TYPE OF SCAN:  vaginal         FINDINGS:           The cul de sac is normal.  The cervix is normal.  The uterus is gravid.         The uterus measures 10.27 cm x 7.70 cm x 6.48 cm. No uterine anomalies are evident.         The right ovary is present. The right ovary measures 2.26 cm x 3.29 cm x 1.13 cm.           The left ovary is present. The left ovary measures 2.72 cm x 1.98 cm x 3.70 cm.           There is a single intrauterine gestational sac is seen measuring 2.70cm, consistent with gestational age of 9weeks and 5days and EDC of 3/13/21. Jim Dancer is a 9 day discrepancy when compared with the gestational age of 7weeks and [de-identified] and [facility] of 3/4/21 set by [facility] (5/28/20). Yolk sac is present and measures 0.88 cm. There is no definite fetal pole or cardiac activity seen.         IMPRESSION:      Single intrauterine gestational sac and yolk sac without definitive presence of a fetal pole or cardiac activity.         Imaging is limited secondary to bowel gas. Patient is well aware of the limitations of ultrasound in the detection of anomalies.               Assessment/Plan:  30 y.o. [id number] presenting for amenorrhea:    1. Pregnancy with uncertain fetal viability, single or unspecified fetus  Patient with single IUP as above but no definitive fetal pole and YS appears enlarged. We discussed possibility of early pregnancy vs missed ab. Will send bHCG today and pt to go to lab Saturday for repeat, further management pending results. Also CBC and T&S sent today and AB precautions reviewed. - CBC Auto Differential  - Type and Screen  - HCG, Quantitative, Pregnancy  - HCG, Quantitative, Pregnancy; Future    2.  Routine screening for STI (sexually transmitted infection)  - Culture, Urine  - Urinalysis  - VAGINAL PATHOGENS PROBE *A  - C.trachomatis N.gonorrhoeae DNA    3.  Positive urine pregnancy test  - POCT urine pregnancy    Dispo: pending HCG Damaris Roldan MD

## 2020-07-31 LAB
C TRACH DNA GENITAL QL NAA+PROBE: NEGATIVE
CANDIDA SPECIES, DNA PROBE: ABNORMAL
GARDNERELLA VAGINALIS, DNA PROBE: ABNORMAL
GONADOTROPIN, CHORIONIC (HCG) QUANT: NORMAL MIU/ML
N. GONORRHOEAE DNA: NEGATIVE
TRICHOMONAS VAGINALIS DNA: ABNORMAL
URINE CULTURE, ROUTINE: NORMAL

## 2020-08-01 ENCOUNTER — HOSPITAL ENCOUNTER (OUTPATIENT)
Age: 25
Discharge: HOME | End: 2020-08-01
Payer: COMMERCIAL

## 2020-08-01 LAB — GONADOTROPIN, CHORIONIC (HCG) QUANT: NORMAL MIU/ML

## 2020-08-01 PROCEDURE — 84702 CHORIONIC GONADOTROPIN TEST: CPT

## 2020-08-01 PROCEDURE — 36415 COLL VENOUS BLD VENIPUNCTURE: CPT

## 2020-08-03 ENCOUNTER — TELEPHONE (OUTPATIENT)
Dept: OBGYN CLINIC | Age: 25
End: 2020-08-03

## 2020-08-03 RX ORDER — MISOPROSTOL 100 UG/1
100 TABLET ORAL 4 TIMES DAILY
Qty: 12 TABLET | Refills: 0 | Status: SHIPPED | OUTPATIENT
Start: 2020-08-03 | End: 2020-12-25 | Stop reason: ALTCHOICE

## 2020-08-03 NOTE — TELEPHONE ENCOUNTER
Pt called in stating Cytotec not sent in -- signed Rx for [address] at [facility].  Will be able to  in am.     [facility]

## 2020-12-25 ENCOUNTER — HOSPITAL ENCOUNTER (EMERGENCY)
Age: 25
Discharge: HOME | End: 2020-12-26
Attending: EMERGENCY MEDICINE
Payer: COMMERCIAL

## 2020-12-25 PROCEDURE — 99284 EMERGENCY DEPT VISIT MOD MDM: CPT

## 2020-12-25 PROCEDURE — 96374 THER/PROPH/DIAG INJ IV PUSH: CPT

## 2020-12-26 ENCOUNTER — APPOINTMENT (OUTPATIENT)
Dept: CT IMAGING | Age: 25
End: 2020-12-26
Payer: COMMERCIAL

## 2020-12-26 VITALS
RESPIRATION RATE: 16 BRPM | TEMPERATURE: 99.1 F | OXYGEN SATURATION: 97 % | DIASTOLIC BLOOD PRESSURE: 57 MMHG | HEART RATE: 68 BPM | WEIGHT: 250.66 LBS | BODY MASS INDEX: 42.69 KG/M2 | SYSTOLIC BLOOD PRESSURE: 99 MMHG

## 2020-12-26 LAB
A/G RATIO: 1.4 (ref 1.1–2.2)
ALBUMIN SERPL-MCNC: 4.2 G/DL (ref 3.4–5)
ALP BLD-CCNC: 71 U/L (ref 40–129)
ALT SERPL-CCNC: 61 U/L (ref 10–40)
ANION GAP SERPL CALCULATED.3IONS-SCNC: 12 MMOL/L (ref 3–16)
AST SERPL-CCNC: 26 U/L (ref 15–37)
BACTERIA: ABNORMAL /HPF
BASOPHILS ABSOLUTE: 0 K/UL (ref 0–0.2)
BASOPHILS RELATIVE PERCENT: 0.2 %
BILIRUB SERPL-MCNC: 0.3 MG/DL (ref 0–1)
BILIRUBIN URINE: NEGATIVE
BLOOD, URINE: ABNORMAL
BUN BLDV-MCNC: 15 MG/DL (ref 7–20)
CALCIUM SERPL-MCNC: 9.4 MG/DL (ref 8.3–10.6)
CHLORIDE BLD-SCNC: 105 MMOL/L (ref 99–110)
CLARITY: CLEAR
CO2: 23 MMOL/L (ref 21–32)
COLOR: YELLOW
CREAT SERPL-MCNC: 0.7 MG/DL (ref 0.6–1.1)
EOSINOPHILS ABSOLUTE: 0.2 K/UL (ref 0–0.6)
EOSINOPHILS RELATIVE PERCENT: 2.1 %
EPITHELIAL CELLS, UA: ABNORMAL /HPF (ref 0–5)
GFR AFRICAN AMERICAN: >60
GFR NON-AFRICAN AMERICAN: >60
GLOBULIN: 2.9 G/DL
GLUCOSE BLD-MCNC: 128 MG/DL (ref 70–99)
GLUCOSE URINE: NEGATIVE MG/DL
HCG(URINE) PREGNANCY TEST: NEGATIVE
HCT VFR BLD CALC: 41.3 % (ref 36–48)
HEMOGLOBIN: 13.8 G/DL (ref 12–16)
KETONES, URINE: NEGATIVE MG/DL
LEUKOCYTE ESTERASE, URINE: NEGATIVE
LYMPHOCYTES ABSOLUTE: 3.2 K/UL (ref 1–5.1)
LYMPHOCYTES RELATIVE PERCENT: 28.2 %
MCH RBC QN AUTO: 29.7 PG (ref 26–34)
MCHC RBC AUTO-ENTMCNC: 33.4 G/DL (ref 31–36)
MCV RBC AUTO: 89.1 FL (ref 80–100)
MICROSCOPIC EXAMINATION: YES
MONOCYTES ABSOLUTE: 0.5 K/UL (ref 0–1.3)
MONOCYTES RELATIVE PERCENT: 4.6 %
MUCUS: ABNORMAL /LPF
NEUTROPHILS ABSOLUTE: 7.4 K/UL (ref 1.7–7.7)
NEUTROPHILS RELATIVE PERCENT: 64.9 %
NITRITE, URINE: NEGATIVE
OCCULT BLOOD SCREENING: ABNORMAL
PDW BLD-RTO: 13.8 % (ref 12.4–15.4)
PH UA: 6 (ref 5–8)
PLATELET # BLD: 223 K/UL (ref 135–450)
PMV BLD AUTO: 7.5 FL (ref 5–10.5)
POTASSIUM REFLEX MAGNESIUM: 3.8 MMOL/L (ref 3.5–5.1)
PROTEIN UA: NEGATIVE MG/DL
RBC # BLD: 4.64 M/UL (ref 4–5.2)
RBC UA: ABNORMAL /HPF (ref 0–4)
SODIUM BLD-SCNC: 140 MMOL/L (ref 136–145)
SPECIFIC GRAVITY UA: >=1.03 (ref 1–1.03)
TOTAL PROTEIN: 7.1 G/DL (ref 6.4–8.2)
URINE REFLEX TO CULTURE: ABNORMAL
URINE TYPE: ABNORMAL
UROBILINOGEN, URINE: 0.2 E.U./DL
WBC # BLD: 11.4 K/UL (ref 4–11)
WBC UA: ABNORMAL /HPF (ref 0–5)
YEAST: PRESENT /HPF

## 2020-12-26 PROCEDURE — 80053 COMPREHEN METABOLIC PANEL: CPT

## 2020-12-26 PROCEDURE — 85025 COMPLETE CBC W/AUTO DIFF WBC: CPT

## 2020-12-26 PROCEDURE — 84703 CHORIONIC GONADOTROPIN ASSAY: CPT

## 2020-12-26 PROCEDURE — 6360000002 HC RX W HCPCS: Performed by: EMERGENCY MEDICINE

## 2020-12-26 PROCEDURE — 81001 URINALYSIS AUTO W/SCOPE: CPT

## 2020-12-26 PROCEDURE — 6370000000 HC RX 637 (ALT 250 FOR IP): Performed by: EMERGENCY MEDICINE

## 2020-12-26 PROCEDURE — 74177 CT ABD & PELVIS W/CONTRAST: CPT

## 2020-12-26 PROCEDURE — 6360000004 HC RX CONTRAST MEDICATION: Performed by: EMERGENCY MEDICINE

## 2020-12-26 PROCEDURE — G0328 FECAL BLOOD SCRN IMMUNOASSAY: HCPCS

## 2020-12-26 PROCEDURE — 36415 COLL VENOUS BLD VENIPUNCTURE: CPT

## 2020-12-26 RX ORDER — ONDANSETRON 2 MG/ML
4 INJECTION INTRAMUSCULAR; INTRAVENOUS ONCE
Status: COMPLETED | OUTPATIENT
Start: 2020-12-26 | End: 2020-12-26

## 2020-12-26 RX ORDER — METRONIDAZOLE 500 MG/1
500 TABLET ORAL ONCE
Status: COMPLETED | OUTPATIENT
Start: 2020-12-26 | End: 2020-12-26

## 2020-12-26 RX ORDER — FLUCONAZOLE 150 MG/1
150 TABLET ORAL ONCE
Qty: 1 TABLET | Refills: 0 | Status: SHIPPED | OUTPATIENT
Start: 2021-01-02 | End: 2021-01-02

## 2020-12-26 RX ORDER — CIPROFLOXACIN 500 MG/1
500 TABLET, FILM COATED ORAL 2 TIMES DAILY
Qty: 14 TABLET | Refills: 0 | Status: SHIPPED | OUTPATIENT
Start: 2020-12-26 | End: 2021-01-02

## 2020-12-26 RX ORDER — CIPROFLOXACIN 500 MG/1
500 TABLET, FILM COATED ORAL ONCE
Status: COMPLETED | OUTPATIENT
Start: 2020-12-26 | End: 2020-12-26

## 2020-12-26 RX ORDER — DICYCLOMINE HYDROCHLORIDE 10 MG/1
10 CAPSULE ORAL EVERY 6 HOURS PRN
Qty: 20 CAPSULE | Refills: 0 | Status: SHIPPED | OUTPATIENT
Start: 2020-12-26 | End: 2021-04-28 | Stop reason: ALTCHOICE

## 2020-12-26 RX ORDER — DICYCLOMINE HYDROCHLORIDE 10 MG/1
10 CAPSULE ORAL ONCE
Status: COMPLETED | OUTPATIENT
Start: 2020-12-26 | End: 2020-12-26

## 2020-12-26 RX ORDER — METRONIDAZOLE 500 MG/1
500 TABLET ORAL 2 TIMES DAILY
Qty: 14 TABLET | Refills: 0 | Status: SHIPPED | OUTPATIENT
Start: 2020-12-26 | End: 2021-01-02

## 2020-12-26 RX ORDER — FLUCONAZOLE 100 MG/1
200 TABLET ORAL ONCE
Status: COMPLETED | OUTPATIENT
Start: 2020-12-26 | End: 2020-12-26

## 2020-12-26 RX ADMIN — FLUCONAZOLE 200 MG: 100 TABLET ORAL at 01:09

## 2020-12-26 RX ADMIN — CIPROFLOXACIN 500 MG: 500 TABLET, FILM COATED ORAL at 02:37

## 2020-12-26 RX ADMIN — IOVERSOL 100 ML: 678 INJECTION INTRA-ARTERIAL; INTRAVENOUS at 01:18

## 2020-12-26 RX ADMIN — METRONIDAZOLE 500 MG: 500 TABLET ORAL at 02:37

## 2020-12-26 RX ADMIN — DICYCLOMINE HYDROCHLORIDE 10 MG: 10 CAPSULE ORAL at 00:21

## 2020-12-26 RX ADMIN — ONDANSETRON 4 MG: 2 INJECTION INTRAMUSCULAR; INTRAVENOUS at 00:34

## 2020-12-26 NOTE — ED NOTES
Patient aware of need for stool specimen.  Verb under, aware that speci pan is in place in [address] [place] [address]  12/26/20 7942

## 2020-12-26 NOTE — ED PROVIDER NOTES
CHIEF COMPLAINT  Rectal Bleeding (1st noticed Thursday night, will have episode \"like menstrual cramps then have to go to BR, expelling blood, no stool noted, states otherwise no pain)      HISTORY OF PRESENT ILLNESS  Christelle Trevino is a 30 y.o. female who presents to the ED complaining of blood per rectum over the past day and a half. Patient states that yesterday she had an episode of diarrhea which she became extremely sweaty and had left lower quadrant cramping. States that stool is loose without any blood during that episode. States that each bowel movement since then has been mucousy with bright red blood in it. States the lower abdominal pain is a cramping sensation on the left and in the midline that is intermittent. States the pain does go away completely at times but when it comes on she feels like she has to have a bowel movement just afterward. States he is having normal urinary habits. Denies any vaginal discharge or bleeding. States she just finished her menstrual cycle 1 week ago. Denies any documented fevers but states she occasionally has hot flashes. Denies any upper abdominal pain. Occasionally has nausea but denies any vomiting. Denies any previous abdominal surgery. No recent antibiotic use. No other complaints, modifying factors or associated symptoms. Nursing notes reviewed.    Past Medical History:   Diagnosis Date    Depression     history of [id number]    Fx one rib-open     LEFT SIDE    Herpes simplex virus (HSV) infection     PTSD (post-traumatic stress disorder)      Past Surgical History:   Procedure Laterality Date    MOUTH SURGERY      CYST    TONSILLECTOMY       Family History   Problem Relation Age of Onset    Cancer Mother 39        uterine ca    Migraines Mother     High Blood Pressure Father     High Cholesterol Father     Migraines Sister     Diabetes Maternal Grandmother     Celiac Disease Maternal Grandmother     Cancer Maternal Grandfather bladder ca with mets     Social History     Socioeconomic History    Marital status: Single     Spouse name: Not on file    Number of children: Not on file    Years of education: Not on file    Highest education level: Not on file   Occupational History    Not on file   Social Needs    Financial resource strain: Not on file    Food insecurity     Worry: Not on file     Inability: Not on file    Transportation needs     Medical: Not on file     Non-medical: Not on file   Tobacco Use    Smoking status: Former Smoker     Packs/day: 0.50     Years: 6.00     Pack years: 3.00     Quit date: 2018     Years since quittin.9    Smokeless tobacco: Never Used    Tobacco comment: down 4-5 cig a day   Substance and Sexual Activity    Alcohol use: Not Currently    Drug use: Never    Sexual activity: Yes     Partners: Male   Lifestyle    Physical activity     Days per week: Not on file     Minutes per session: Not on file    Stress: Not on file   Relationships    Social connections     Talks on phone: Not on file     Gets together: Not on file     Attends Confucianist service: Not on file     Active member of club or organization: Not on file     Attends meetings of clubs or organizations: Not on file     Relationship status: Not on file    Intimate partner violence     Fear of current or ex partner: Not on file     Emotionally abused: Not on file     Physically abused: Not on file     Forced sexual activity: Not on file   Other Topics Concern    Not on file   Social History Narrative    Not on file     Current Facility-Administered Medications   Medication Dose Route Frequency Provider Last Rate Last Admin    ciprofloxacin (CIPRO) tablet 500 mg  500 mg Oral Once Mary Ross MD        metroNIDAZOLE (FLAGYL) tablet 500 mg  500 mg Oral Once Mary Ross MD         Current Outpatient Medications   Medication Sig Dispense Refill    ciprofloxacin (CIPRO) 500 MG tablet Take 1 tablet by mouth 2 times daily for 7 days 14 tablet 0    metroNIDAZOLE (FLAGYL) 500 MG tablet Take 1 tablet by mouth 2 times daily for 7 days 14 tablet 0    dicyclomine (BENTYL) 10 MG capsule Take 1 capsule by mouth every 6 hours as needed (cramps) 20 capsule 0    Tpfyu-Yvndy-Xqfstha-Pramoxine (NEOSPORIN/BURN RELIEF) 1 % OINT Apply 1 Units topically 3 times daily as needed (UNTIL SYMPTOMS RESOLVE) (Patient not taking: Reported on 7/30/2020) 28 g 0     Allergies   Allergen Reactions    Sulfa Antibiotics          REVIEW OF SYSTEMS  10 systems reviewed, pertinent positives per HPI otherwise noted to be negative    PHYSICAL EXAM  /80   Pulse 104   Temp 99.1 °F (37.3 °C) (Oral)   Resp 16   Wt 250 lb 10.6 oz (113.7 kg)   LMP 12/21/2020 (Exact Date)   SpO2 97%   BMI 42.69 kg/m²      CONSTITUTIONAL: AOx4, cooperative with exam, afebrile   HEAD: normocephalic, atraumatic   EYES: PERRL, EOMI, anicteric sclera   ENT: Moist mucous membranes, uvula midline   LUNGS: Bilateral breath sounds, CTAB, no rales/ronchi/wheezes   CARDIOVASCULAR: RRR, normal S1/S2, no m/r/g, 2+ pulses throughout   ABDOMEN: Soft, left lower quadrant tenderness, suprapubic tenderness, no rebound tenderness or guarding, negative Diamond's point, negative Lebron sign, no upper abdominal tenderness, no peritoneal signs, non-distended, +BS   NEUROLOGIC:  MAEx4, GCS 15   MUSCULOSKELETAL: No clubbing, cyanosis or edema   SKIN: No rash, pallor or wounds on exposed surfaces     Discussed rectal exam but patient deferred. RADIOLOGY  X-RAYS:  I have reviewed radiologic plain film image(s). ALL OTHER NON-PLAIN FILM IMAGES SUCH AS CT, ULTRASOUND AND MRI HAVE BEEN READ BY THE RADIOLOGIST. CT ABDOMEN PELVIS W IV CONTRAST Additional Contrast? None   Final Result   Addendum 1 of 1   ADDENDUM:   Few scattered diverticula are seen within the descending and sigmoid colon   without evidence of associated diverticulitis.          Final             EKG INTERPRETATION  None    PROCEDURES    ED COURSE/MDM  Colitis, diverticulitis, UTI, IBS, hemorrhoids, pyelonephritis  Patient seen and evaluated. History and physical as above. Nontoxic, afebrile. Patient with complaints of blood per rectum with mucousy loose stool and left lower quadrant abdominal pain. Does have left lower quadrant and suprapubic tenderness on exam.  No peritoneal signs. Patient tolerating oral intake. Does complain of nausea without vomiting. No vaginal bleeding or discharge. Plan for routine abdominal labs  ED Course as of Dec 26 0234   Sat Dec 26, 2020   0887 Patient's occult stool sample positive for blood. Urinalysis negative for infection but does have yeast in it. Pregnancy negative. White count 11.4. Hemoglobin 13.8. CMP unremarkable with normal creatinine at 0.7.    [DS]   0228 Patient CT abdomen pelvis shows some diverticula throughout the descending colon. Spoke with Dr. Km Bailey, radiology, to review patient's scan and possibility of diverticulitis. No evidence visual evidence of diverticulitis although with patient's cramping and bloody mucousy stool, will treat with metronidazole and ciprofloxacin with outpatient follow-up. Patient agreeable. Patient tolerating oral intake. Strict return instructions provided. All questions answered prior to discharge. [DS]      ED Course User Index  [DS] Silvano Gayle MD       I estimate there is LOW risk for ACUTE APPENDICITIS, BOWEL OBSTRUCTION, CHOLECYSTITIS, DIVERTICULITIS, INCARCERATED HERNIA, PANCREATITIS, PELVIC INFLAMMATORY DISEASE, PERFORATED BOWEL or ULCER, PREGNANCY, or TUBO-OVARIAN ABSCESS, thus I consider the discharge disposition reasonable. Also, there is no evidence or peritonitis, sepsis, or toxicity. Sanaz Martin and I have discussed the diagnosis and risks, and we agree with discharging home to follow-up with their primary doctor.  We also discussed returning to the Emergency Department immediately if new or worsening symptoms occur. We have discussed the symptoms which are most concerning (e.g., bloody stool, fever, changing or worsening pain, vomiting) that necessitate immediate return. Patient was given scripts for the following medications. I counseled patient how to take these medications. New Prescriptions    CIPROFLOXACIN (CIPRO) 500 MG TABLET    Take 1 tablet by mouth 2 times daily for 7 days    DICYCLOMINE (BENTYL) 10 MG CAPSULE    Take 1 capsule by mouth every 6 hours as needed (cramps)    METRONIDAZOLE (FLAGYL) 500 MG TABLET    Take 1 tablet by mouth 2 times daily for 7 days           CLINICAL IMPRESSION  1. Yeast cystitis    2. Diverticulitis of colon    3. Rectal bleeding        Blood pressure 132/80, pulse 104, temperature 99.1 °F (37.3 °C), temperature source Oral, resp. rate 16, weight 250 lb 10.6 oz (113.7 kg), last menstrual period 12/21/2020, SpO2 97 %, unknown if currently breastfeeding. DISPOSITION  Patient was discharged to home in good condition. Anam Valera APRN - CNP  [address] [phone]    Call in 1 day  For a follow up appointment. Disclaimer: All medical record entries made by [employer] dictation.       (Please note that this note was completed with a voice recognition program. Every attempt was made to edit the dictations, but inevitably there remain words that are mis-transcribed.)            Claribel Land MD  12/26/20 3819

## 2021-03-04 ENCOUNTER — OFFICE VISIT (OUTPATIENT)
Dept: OBGYN CLINIC | Age: 26
End: 2021-03-04
Payer: COMMERCIAL

## 2021-03-04 VITALS
BODY MASS INDEX: 42.37 KG/M2 | WEIGHT: 248.8 LBS | DIASTOLIC BLOOD PRESSURE: 68 MMHG | TEMPERATURE: 98.8 F | SYSTOLIC BLOOD PRESSURE: 118 MMHG | HEART RATE: 88 BPM

## 2021-03-04 DIAGNOSIS — N91.2 AMENORRHEA: Primary | ICD-10-CM

## 2021-03-04 DIAGNOSIS — Z12.4 PAP SMEAR FOR CERVICAL CANCER SCREENING: ICD-10-CM

## 2021-03-04 DIAGNOSIS — Z20.2 POSSIBLE EXPOSURE TO STD: ICD-10-CM

## 2021-03-04 DIAGNOSIS — Z01.419 ENCOUNTER FOR ANNUAL ROUTINE GYNECOLOGICAL EXAMINATION: Primary | ICD-10-CM

## 2021-03-04 DIAGNOSIS — N91.2 AMENORRHEA: ICD-10-CM

## 2021-03-04 LAB
BILIRUBIN URINE: NEGATIVE
BLOOD, URINE: NEGATIVE
CLARITY: CLEAR
COLOR: YELLOW
CONTROL: ABNORMAL
CRL: NORMAL
EPITHELIAL CELLS, UA: 1 /HPF (ref 0–5)
GLUCOSE URINE: NEGATIVE MG/DL
HYALINE CASTS: 1 /LPF (ref 0–8)
KETONES, URINE: NEGATIVE MG/DL
LEUKOCYTE ESTERASE, URINE: NEGATIVE
MICROSCOPIC EXAMINATION: NORMAL
NITRITE, URINE: NEGATIVE
PH UA: 7 (ref 5–8)
PREGNANCY TEST URINE, POC: POSITIVE
PROTEIN UA: NEGATIVE MG/DL
RBC UA: NORMAL /HPF (ref 0–4)
SAC DIAMETER: NORMAL
SPECIFIC GRAVITY UA: 1.03 (ref 1–1.03)
URINE TYPE: NORMAL
UROBILINOGEN, URINE: 0.2 E.U./DL
WBC UA: 1 /HPF (ref 0–5)

## 2021-03-04 PROCEDURE — G8484 FLU IMMUNIZE NO ADMIN: HCPCS | Performed by: OBSTETRICS & GYNECOLOGY

## 2021-03-04 PROCEDURE — 81025 URINE PREGNANCY TEST: CPT | Performed by: OBSTETRICS & GYNECOLOGY

## 2021-03-04 PROCEDURE — 99395 PREV VISIT EST AGE 18-39: CPT | Performed by: OBSTETRICS & GYNECOLOGY

## 2021-03-04 PROCEDURE — 76801 OB US < 14 WKS SINGLE FETUS: CPT | Performed by: OBSTETRICS & GYNECOLOGY

## 2021-03-04 RX ORDER — PYRIDOXINE HCL (VITAMIN B6) 25 MG
25 TABLET ORAL 2 TIMES DAILY PRN
Qty: 30 TABLET | Refills: 1 | Status: SHIPPED | OUTPATIENT
Start: 2021-03-04 | End: 2021-03-30 | Stop reason: SDUPTHER

## 2021-03-04 ASSESSMENT — ENCOUNTER SYMPTOMS
ABDOMINAL PAIN: 0
DIARRHEA: 0
VOMITING: 0
SHORTNESS OF BREATH: 0
NAUSEA: 1

## 2021-03-04 NOTE — PROGRESS NOTES
Annual Exam      CC:   Chief Complaint   Patient presents with    Amenorrhea     LMP mid [de-identified]       HPI:  30 y.o. [id number] presents for her gynecologic annual exam. Also here for amenorrhea. LMP around 21, December period ended on 21, had 3 days of bleeding in January that was heavy for a day and then light (not like her normal). +home UPT 2021. Feeling super nauseated, no vomiting. Had issues with this in previous pregnancies, didn't like using zofran previously due to constipation. Otherwsise doing well, no bleeding, cramping. No breast tenderness. Has had heightened sense of smell. MAB in 2020. Has been doing well since then. Patient seen and examined. Review of Systems:   Review of Systems   Constitutional: Negative for chills and fever. Respiratory: Negative for shortness of breath. Cardiovascular: Negative for chest pain. Gastrointestinal: Positive for nausea. Negative for abdominal pain, diarrhea and vomiting. Genitourinary: Positive for menstrual problem. Negative for difficulty urinating, dysuria, vaginal bleeding and vaginal discharge. Neurological: Positive for headaches. Negative for dizziness and light-headedness. Primary Care Physician: RAGHAV Ortiz CNP    Obstetric History  OB History    Para Term  AB Living   3 2 2   1 2   SAB TAB Ectopic Molar Multiple Live Births   1         2      # Outcome Date GA Lbr Stephon/2nd Weight Sex Delivery Anes PTL Lv   3 SAB            2 Term 10/26/19 39w4d  11 lb 3.4 oz (5.086 kg) F Vag-Spont EPI N TIERNEY      Complications: Shoulder Dystocia   1 Term 03/17/15 40w0d  8 lb 11.6 oz (3.958 kg) F Vag-Spont EPI N TIERNEY       Gynecologic History  Menstrual History:  ? LMP: mid January  ? Menstrual pattern: see above  Sexual History:  ? Contraception: none  ? Currently is sexually active  ? Denies history of STIs  ?  No sexual problems  Pap History:  ? Last pap smear: 2018, normal ? History of abnormal pap smears: denies    Medical History:  Past Medical History:   Diagnosis Date    Depression     history of 1301-2401    Fx one rib-open     LEFT SIDE    Herpes simplex virus (HSV) infection     PTSD (post-traumatic stress disorder)        Surgical History:  Past Surgical History:   Procedure Laterality Date    MOUTH SURGERY      CYST    TONSILLECTOMY         Medications:  Current Outpatient Medications   Medication Sig Dispense Refill    dicyclomine (BENTYL) 10 MG capsule Take 1 capsule by mouth every 6 hours as needed (cramps) (Patient not taking: Reported on 3/4/2021) 20 capsule 0    Bburh-Gkbht-Eqhfjsm-Pramoxine (NEOSPORIN/BURN RELIEF) 1 % OINT Apply 1 Units topically 3 times daily as needed (UNTIL SYMPTOMS RESOLVE) (Patient not taking: Reported on 7/30/2020) 28 g 0     No current facility-administered medications for this visit. - PNV    Allergies:   Allergies   Allergen Reactions    Sulfa Antibiotics        Social History:  Social History     Socioeconomic History    Marital status: Single     Spouse name: None    Number of children: None    Years of education: None    Highest education level: None   Occupational History    None   Social Needs    Financial resource strain: None    Food insecurity     Worry: None     Inability: None    Transportation needs     Medical: None     Non-medical: None   Tobacco Use    Smoking status: Current Every Day Smoker     Packs/day: 0.50     Years: 6.00     Pack years: 3.00     Last attempt to quit: 1/22/2018     Years since quitting: 3.1    Smokeless tobacco: Never Used    Tobacco comment: down 4-5 cig a day   Substance and Sexual Activity    Alcohol use: Not Currently    Drug use: Never    Sexual activity: Yes     Partners: Male   Lifestyle    Physical activity     Days per week: None     Minutes per session: None    Stress: None   Relationships    Social connections     Talks on phone: None     Gets together: None Attends Yazdanism service: None     Active member of club or organization: None     Attends meetings of clubs or organizations: None     Relationship status: None    Intimate partner violence     Fear of current or ex partner: None     Emotionally abused: None     Physically abused: None     Forced sexual activity: None   Other Topics Concern    None   Social History Narrative    None       Family History:  Family History   Problem Relation Age of Onset    Cancer Mother 39        uterine ca    Migraines Mother     High Blood Pressure Father     High Cholesterol Father     Migraines Sister     Diabetes Maternal Grandmother     Celiac Disease Maternal Grandmother     Cancer Maternal Grandfather         bladder ca with mets       Objective: Body mass index is 42.37 kg/m². /68 (Site: Right Upper Arm, Position: Sitting, Cuff Size: Large Adult)   Pulse 88   Temp 98.8 °F (37.1 °C) (Oral)   Wt 248 lb 12.8 oz (112.9 kg)   BMI 42.37 kg/m²     Exam:   Physical Exam  Exam conducted with a chaperone present. Constitutional:       Appearance: She is well-developed. HENT:      Head: Normocephalic and atraumatic. Neck:      Musculoskeletal: Normal range of motion. Cardiovascular:      Rate and Rhythm: Normal rate and regular rhythm. Pulmonary:      Effort: Pulmonary effort is normal. No respiratory distress. Chest:      Breasts:         Right: Normal. No mass, nipple discharge or skin change. Left: Normal. No mass, nipple discharge or skin change. Abdominal:      General: There is no distension. Palpations: Abdomen is soft. Tenderness: There is no abdominal tenderness. There is no guarding or rebound.    Genitourinary: Comments: Pelvic exam: VULVA: normal appearing vulva with no masses, tenderness or lesions, VAGINA: normal appearing vagina with normal color and discharge, no lesions, CERVIX: normal appearing cervix without discharge or lesions, UTERUS: uterus is normal size, shape, consistency and nontender, ADNEXA: normal adnexa in size, nontender and no masses. Neurological:      Mental Status: She is alert and oriented to person, place, and time. Assessment/Plan:  30 y.o. [id number] presenting for her annual exam:    1. Encounter for annual routine gynecological examination  Discussed age appropriate screening recommendations, pap smear sent today. Discussed breast self awareness, STI screening as below. - PAP SMEAR    2. Pap smear for cervical cancer screening  - PAP SMEAR    3. Possible exposure to STD  - VAGINAL PATHOGENS PROBE *A  - C.trachomatis N.gonorrhoeae DNA    4. Amenorrhea  Patient with amenorrhea, US today shows single IUP with cardiac activity at 7+1 with final BETHANIE 10/20/21 by L=7wk US.  - Discussed routine prenatal care, early pregnancy precautions, continued use of PNV  - Below labs sent  - Briefly reviewed options for genetic screening including cffDNA, 1st trimester screen with NT/JENNIFFER-A, and MQS in 2nd trimester. Declines at this time.   - Rx B6 to pharmacy today for nausea/vomiting    - Culture, Urine  - URINALYSIS WITH MICROSCOPIC  - POCT urine pregnancy      Dispo: 4 weeks for IPV  Nico Costa MD

## 2021-03-05 LAB
C TRACH DNA GENITAL QL NAA+PROBE: NEGATIVE
CANDIDA SPECIES, DNA PROBE: ABNORMAL
GARDNERELLA VAGINALIS, DNA PROBE: ABNORMAL
N. GONORRHOEAE DNA: NEGATIVE
TRICHOMONAS VAGINALIS DNA: ABNORMAL
URINE CULTURE, ROUTINE: NORMAL

## 2021-03-30 ENCOUNTER — INITIAL PRENATAL (OUTPATIENT)
Dept: OBGYN CLINIC | Age: 26
End: 2021-03-30
Payer: COMMERCIAL

## 2021-03-30 VITALS
SYSTOLIC BLOOD PRESSURE: 110 MMHG | WEIGHT: 254.8 LBS | HEART RATE: 82 BPM | DIASTOLIC BLOOD PRESSURE: 68 MMHG | BODY MASS INDEX: 43.4 KG/M2

## 2021-03-30 DIAGNOSIS — O21.9 NAUSEA AND VOMITING DURING PREGNANCY: ICD-10-CM

## 2021-03-30 DIAGNOSIS — Z34.91 PRENATAL CARE IN FIRST TRIMESTER: Primary | ICD-10-CM

## 2021-03-30 DIAGNOSIS — A60.00 HERPES SIMPLEX INFECTION OF GENITOURINARY SYSTEM: ICD-10-CM

## 2021-03-30 PROCEDURE — 0500F INITIAL PRENATAL CARE VISIT: CPT | Performed by: OBSTETRICS & GYNECOLOGY

## 2021-03-30 PROCEDURE — 36415 COLL VENOUS BLD VENIPUNCTURE: CPT | Performed by: OBSTETRICS & GYNECOLOGY

## 2021-03-30 RX ORDER — PYRIDOXINE HCL (VITAMIN B6) 25 MG
25 TABLET ORAL 3 TIMES DAILY PRN
Qty: 30 TABLET | Refills: 1 | Status: ON HOLD | OUTPATIENT
Start: 2021-03-30 | End: 2021-10-10 | Stop reason: HOSPADM

## 2021-03-30 NOTE — PROGRESS NOTES
Initial OB Office Visit    CC:   Chief Complaint   Patient presents with    Initial Prenatal Visit       HPI:  Pt seen and examined. No concerns/complaints. Denies VB, LOF, discharge. No cramps, no fetal movements. Has had some issues with nausea since last visit, only got sick and vomited twice but has just been feeling bad. Only using B6 right now and seems to help, using 2 times/day. Has used zofran in other pregnancies and had severe constipation so wants to avoid if possible. Maternal wellness questionnaire reviewed - no concerns today. Score 6. Moods have been stable. Objective:  /68   Pulse 82   Wt 254 lb 12.8 oz (115.6 kg)   LMP 01/13/2021   BMI 43.40 kg/m²   Gen: AO, NAD  Abd: Soft, NT  FHT: 168    Assessment/Plan:  27 y.o. [id number] at [facility] (Estimated Date of Delivery: 10/20/21) presents for Initial OB appointment: . Problem List Items Addressed This Visit        Gynecology/Obstetric Problems    Herpes genitalia     Will need prophylaxis at 36 weeks         Prenatal care in first trimester - Primary     - FWB: reassuring by paul  - Genetic screening: declined  - Anatomy scan: plan at 20 weeks  - Tdap: plan at 28wks  - PNL: sent today, GCCT/trich neg, UCx neg         Relevant Orders    CBC WITH AUTO DIFFERENTIAL    TSH with Reflex    RUBELLA ANTIBODY, IGG    HIV-1 AND HIV-2 ANTIBODIES    HEPATITIS B SURFACE ANTIGEN    Syphilis Antibody Cascading Reflex    TYPE AND SCREEN    Drug Screen Multi Urine With Bup    VARICELLA ZOSTER ANTIBODY, IGG    Nausea and vomiting during pregnancy     Continues to have nausea throughout the day, B6 helping some  - OK to use B6 TID  - offered zofran, would like to avoid due to constipation issues with this.  If sx persist consider phenergan               Dispo: RTC in 4 weeks  Deborah Padilla MD

## 2021-03-30 NOTE — ASSESSMENT & PLAN NOTE
Continues to have nausea throughout the day, B6 helping some  - OK to use B6 TID  - offered zofran, would like to avoid due to constipation issues with this.  If sx persist consider phenergan

## 2021-03-30 NOTE — ASSESSMENT & PLAN NOTE
- FWB: reassuring by paul  - Genetic screening: declined  - Anatomy scan: plan at 20 weeks  - Tdap: plan at 28wks  - PNL: sent today, GCCT/trich neg, UCx neg

## 2021-03-31 LAB
ABO/RH: NORMAL
AMPHETAMINE SCREEN, URINE: NORMAL
ANTIBODY SCREEN: NORMAL
BARBITURATE SCREEN URINE: NORMAL
BASOPHILS ABSOLUTE: 0 K/UL (ref 0–0.2)
BASOPHILS RELATIVE PERCENT: 0.5 %
BENZODIAZEPINE SCREEN, URINE: NORMAL
BUPRENORPHINE URINE: NORMAL
CANNABINOID SCREEN URINE: NORMAL
COCAINE METABOLITE SCREEN URINE: NORMAL
EOSINOPHILS ABSOLUTE: 0.1 K/UL (ref 0–0.6)
EOSINOPHILS RELATIVE PERCENT: 1 %
HCT VFR BLD CALC: 39.6 % (ref 36–48)
HEMOGLOBIN: 13.7 G/DL (ref 12–16)
HEPATITIS B SURFACE ANTIGEN INTERPRETATION: NORMAL
HIV AG/AB: NORMAL
HIV ANTIGEN: NORMAL
HIV-1 ANTIBODY: NORMAL
HIV-2 AB: NORMAL
LYMPHOCYTES ABSOLUTE: 1.6 K/UL (ref 1–5.1)
LYMPHOCYTES RELATIVE PERCENT: 18.8 %
Lab: NORMAL
MCH RBC QN AUTO: 30.4 PG (ref 26–34)
MCHC RBC AUTO-ENTMCNC: 34.5 G/DL (ref 31–36)
MCV RBC AUTO: 88 FL (ref 80–100)
METHADONE SCREEN, URINE: NORMAL
MONOCYTES ABSOLUTE: 0.3 K/UL (ref 0–1.3)
MONOCYTES RELATIVE PERCENT: 3 %
NEUTROPHILS ABSOLUTE: 6.6 K/UL (ref 1.7–7.7)
NEUTROPHILS RELATIVE PERCENT: 76.7 %
OPIATE SCREEN URINE: NORMAL
OXYCODONE URINE: NORMAL
PDW BLD-RTO: 13.1 % (ref 12.4–15.4)
PH UA: 7
PHENCYCLIDINE SCREEN URINE: NORMAL
PLATELET # BLD: 217 K/UL (ref 135–450)
PMV BLD AUTO: 7.9 FL (ref 5–10.5)
PROPOXYPHENE SCREEN: NORMAL
RBC # BLD: 4.49 M/UL (ref 4–5.2)
RPR CONFIRMATORY: REACTIVE
RUBELLA ANTIBODY IGG: 447.9 IU/ML
T3 TOTAL: 1.42 NG/ML (ref 0.8–2)
T4 FREE: 1.1 NG/DL (ref 0.9–1.8)
TOTAL SYPHILLIS IGG/IGM: REACTIVE
TSH REFLEX: 0.15 UIU/ML (ref 0.27–4.2)
VARICELLA-ZOSTER VIRUS AB, IGG: NORMAL
WBC # BLD: 8.6 K/UL (ref 4–11)

## 2021-04-01 LAB — RPR TITER: NORMAL

## 2021-04-07 LAB — TREPONEMA PALLIDUM ANTIBODIES: REACTIVE

## 2021-04-13 DIAGNOSIS — A53.9 ACQUIRED SYPHILIS: ICD-10-CM

## 2021-04-14 ENCOUNTER — HOSPITAL ENCOUNTER (OUTPATIENT)
Dept: NURSING | Age: 26
Setting detail: INFUSION SERIES
Discharge: HOME | End: 2021-04-14
Payer: COMMERCIAL

## 2021-04-14 VITALS
DIASTOLIC BLOOD PRESSURE: 77 MMHG | BODY MASS INDEX: 42.68 KG/M2 | SYSTOLIC BLOOD PRESSURE: 120 MMHG | HEART RATE: 96 BPM | WEIGHT: 250 LBS | HEIGHT: 64 IN | TEMPERATURE: 98.2 F | RESPIRATION RATE: 16 BRPM

## 2021-04-14 DIAGNOSIS — A53.9 ACQUIRED SYPHILIS: Primary | ICD-10-CM

## 2021-04-14 PROCEDURE — 6360000002 HC RX W HCPCS: Performed by: OBSTETRICS & GYNECOLOGY

## 2021-04-14 PROCEDURE — 99211 OFF/OP EST MAY X REQ PHY/QHP: CPT

## 2021-04-14 PROCEDURE — 96372 THER/PROPH/DIAG INJ SC/IM: CPT

## 2021-04-14 RX ADMIN — PENICILLIN G BENZATHINE 2.4 MILLION UNITS: 2400000 INJECTION, SUSPENSION INTRAMUSCULAR at 12:46

## 2021-04-14 ASSESSMENT — PAIN - FUNCTIONAL ASSESSMENT: PAIN_FUNCTIONAL_ASSESSMENT: 0-10

## 2021-04-14 NOTE — PROGRESS NOTES
Pt tolerates injection well refuses discharge instructions has had previously.  Stays in unit 15 minutes post injection with no s/s of reaction discharged to home in stable condition

## 2021-04-21 ENCOUNTER — HOSPITAL ENCOUNTER (OUTPATIENT)
Dept: NURSING | Age: 26
Setting detail: INFUSION SERIES
Discharge: HOME | End: 2021-04-21
Payer: COMMERCIAL

## 2021-04-21 VITALS
TEMPERATURE: 98.3 F | BODY MASS INDEX: 41.65 KG/M2 | RESPIRATION RATE: 20 BRPM | HEIGHT: 65 IN | DIASTOLIC BLOOD PRESSURE: 71 MMHG | WEIGHT: 250 LBS | SYSTOLIC BLOOD PRESSURE: 117 MMHG | HEART RATE: 88 BPM

## 2021-04-21 DIAGNOSIS — A53.9 ACQUIRED SYPHILIS: Primary | ICD-10-CM

## 2021-04-21 PROCEDURE — 96372 THER/PROPH/DIAG INJ SC/IM: CPT

## 2021-04-21 PROCEDURE — 6360000002 HC RX W HCPCS: Performed by: OBSTETRICS & GYNECOLOGY

## 2021-04-21 RX ADMIN — PENICILLIN G BENZATHINE 2.4 MILLION UNITS: 2400000 INJECTION, SUSPENSION INTRAMUSCULAR at 12:50

## 2021-04-21 ASSESSMENT — PAIN - FUNCTIONAL ASSESSMENT: PAIN_FUNCTIONAL_ASSESSMENT: 0-10

## 2021-04-21 ASSESSMENT — PAIN DESCRIPTION - DESCRIPTORS: DESCRIPTORS: DULL;PRESSURE

## 2021-04-28 ENCOUNTER — HOSPITAL ENCOUNTER (OUTPATIENT)
Dept: NURSING | Age: 26
Setting detail: INFUSION SERIES
Discharge: HOME | End: 2021-04-28
Payer: COMMERCIAL

## 2021-04-28 ENCOUNTER — ROUTINE PRENATAL (OUTPATIENT)
Dept: OBGYN CLINIC | Age: 26
End: 2021-04-28

## 2021-04-28 VITALS
BODY MASS INDEX: 44.05 KG/M2 | DIASTOLIC BLOOD PRESSURE: 61 MMHG | WEIGHT: 258 LBS | HEIGHT: 64 IN | TEMPERATURE: 98.5 F | RESPIRATION RATE: 16 BRPM | SYSTOLIC BLOOD PRESSURE: 123 MMHG | HEART RATE: 91 BPM

## 2021-04-28 VITALS
DIASTOLIC BLOOD PRESSURE: 76 MMHG | BODY MASS INDEX: 43.67 KG/M2 | HEART RATE: 97 BPM | WEIGHT: 258.4 LBS | SYSTOLIC BLOOD PRESSURE: 124 MMHG

## 2021-04-28 DIAGNOSIS — O21.9 NAUSEA AND VOMITING DURING PREGNANCY: ICD-10-CM

## 2021-04-28 DIAGNOSIS — A60.00 HERPES SIMPLEX INFECTION OF GENITOURINARY SYSTEM: ICD-10-CM

## 2021-04-28 DIAGNOSIS — A53.9 ACQUIRED SYPHILIS: ICD-10-CM

## 2021-04-28 DIAGNOSIS — A53.9 ACQUIRED SYPHILIS: Primary | ICD-10-CM

## 2021-04-28 DIAGNOSIS — Z34.92 PRENATAL CARE IN SECOND TRIMESTER: Primary | ICD-10-CM

## 2021-04-28 PROCEDURE — 99211 OFF/OP EST MAY X REQ PHY/QHP: CPT

## 2021-04-28 PROCEDURE — 6360000002 HC RX W HCPCS: Performed by: OBSTETRICS & GYNECOLOGY

## 2021-04-28 PROCEDURE — 96372 THER/PROPH/DIAG INJ SC/IM: CPT

## 2021-04-28 PROCEDURE — 0502F SUBSEQUENT PRENATAL CARE: CPT | Performed by: OBSTETRICS & GYNECOLOGY

## 2021-04-28 RX ADMIN — PENICILLIN G BENZATHINE 2.4 MILLION UNITS: 2400000 INJECTION, SUSPENSION INTRAMUSCULAR at 11:56

## 2021-04-28 ASSESSMENT — PAIN - FUNCTIONAL ASSESSMENT: PAIN_FUNCTIONAL_ASSESSMENT: 0-10

## 2021-04-28 NOTE — ASSESSMENT & PLAN NOTE
- FWB: reassuring by paul  - Genetic screening: declined  - Anatomy scan: plan at 20 weeks  - Tdap: plan at 28wks  - PNL: B+/ab-, RI, HIV neg, HepB neg, RPR reactive, Hgb 13.7, UDS neg, VZV non-immune, , GCCT/trich neg, UCx neg

## 2021-04-28 NOTE — ASSESSMENT & PLAN NOTE
H/o syphilis 2 years, RPR and confirmatory positive with IPV labs.  Discussed with patient, will treat with PenG x3 given pregnancy   - patient getting 3rd shot today

## 2021-06-02 ENCOUNTER — OFFICE VISIT (OUTPATIENT)
Dept: OBGYN CLINIC | Age: 26
End: 2021-06-02
Payer: COMMERCIAL

## 2021-06-02 ENCOUNTER — ROUTINE PRENATAL (OUTPATIENT)
Dept: OBGYN CLINIC | Age: 26
End: 2021-06-02

## 2021-06-02 VITALS
BODY MASS INDEX: 45.28 KG/M2 | DIASTOLIC BLOOD PRESSURE: 70 MMHG | SYSTOLIC BLOOD PRESSURE: 114 MMHG | HEART RATE: 90 BPM | WEIGHT: 263.8 LBS

## 2021-06-02 DIAGNOSIS — O21.9 NAUSEA AND VOMITING DURING PREGNANCY: ICD-10-CM

## 2021-06-02 DIAGNOSIS — Z34.92 PRENATAL CARE IN SECOND TRIMESTER: Primary | ICD-10-CM

## 2021-06-02 DIAGNOSIS — A53.9 ACQUIRED SYPHILIS: ICD-10-CM

## 2021-06-02 DIAGNOSIS — A60.00 HERPES SIMPLEX INFECTION OF GENITOURINARY SYSTEM: ICD-10-CM

## 2021-06-02 LAB
ABDOMINAL CIRCUMFERENCE: NORMAL
BIPARIETAL DIAMETER: NORMAL
ESTIMATED FETAL WEIGHT: NORMAL
FEMORAL DIAMETER: NORMAL
HC/AC: NORMAL
HEAD CIRCUMFERENCE: NORMAL

## 2021-06-02 PROCEDURE — 76805 OB US >/= 14 WKS SNGL FETUS: CPT | Performed by: OBSTETRICS & GYNECOLOGY

## 2021-06-02 PROCEDURE — 0502F SUBSEQUENT PRENATAL CARE: CPT | Performed by: OBSTETRICS & GYNECOLOGY

## 2021-06-02 NOTE — PROGRESS NOTES
Return OB Office Visit    CC:   Chief Complaint   Patient presents with    Routine Prenatal Visit       HPI:  Pt seen and examined. No concerns/complaints. Denies VB, LOF, ctx. Small fetal movements noted. Things have been going OK, has had some swelling after going on a long road trip earlier this month (just got back 5/20). No pain, just swollen. No redness, occasionally tender. Objective:  /70   Pulse 90   Wt 263 lb 12.8 oz (119.7 kg)   LMP 01/13/2021   BMI 45.28 kg/m²   Gen: AO, NAD  Abd: Soft, NT  FHT: 145    Assessment/Plan:  27 y.o. [id number] at 20w0d (Estimated Date of Delivery: 10/20/21) presents for NAT appointment:     Problem List Items Addressed This Visit        Gynecology/Obstetric Problems    Herpes genitalia     Will need prophylaxis at 36 weeks         Prenatal care in second trimester - Primary     - FWB: reassuring by [address] today  - Genetic screening: declined  - Anatomy scan: 6/2/21 -- normal anatomy, anterior placenta, nl fluid, CL wnl  - Tdap: plan at 28wks  - PNL: B+/ab-, RI, HIV neg, HepB neg, RPR reactive, Hgb 13.7, UDS neg, VZV non-immune, , GCCT/trich neg, UCx neg         Nausea and vomiting during pregnancy    Acquired syphilis     H/o syphilis 2 years, RPR and confirmatory positive with IPV labs.  S/p PCN G x3 doses in early pregnancy                Dispo: RTC in 4 weeks  Elba Hall MD

## 2021-06-03 NOTE — ASSESSMENT & PLAN NOTE
- FWB: reassuring by US today  - Genetic screening: declined  - Anatomy scan: 6/2/21 -- normal anatomy, anterior placenta, nl fluid, CL wnl  - Tdap: plan at 28wks  - PNL: B+/ab-, RI, HIV neg, HepB neg, RPR reactive, Hgb 13.7, UDS neg, VZV non-immune, , GCCT/trich neg, UCx neg

## 2021-06-03 NOTE — ASSESSMENT & PLAN NOTE
H/o syphilis 2 years, RPR and confirmatory positive with IPV labs.  S/p PCN G x3 doses in early pregnancy

## 2021-06-30 ENCOUNTER — ROUTINE PRENATAL (OUTPATIENT)
Dept: OBGYN CLINIC | Age: 26
End: 2021-06-30

## 2021-06-30 VITALS
SYSTOLIC BLOOD PRESSURE: 122 MMHG | HEART RATE: 102 BPM | BODY MASS INDEX: 46.45 KG/M2 | WEIGHT: 270.6 LBS | DIASTOLIC BLOOD PRESSURE: 76 MMHG

## 2021-06-30 DIAGNOSIS — A53.9 ACQUIRED SYPHILIS: ICD-10-CM

## 2021-06-30 DIAGNOSIS — A60.00 GENITAL HERPES SIMPLEX, UNSPECIFIED SITE: ICD-10-CM

## 2021-06-30 DIAGNOSIS — Z34.92 PRENATAL CARE IN SECOND TRIMESTER: Primary | ICD-10-CM

## 2021-06-30 DIAGNOSIS — F17.200 SMOKING: ICD-10-CM

## 2021-06-30 DIAGNOSIS — O12.02 SWELLING OF LOWER EXTREMITY DURING PREGNANCY IN SECOND TRIMESTER: ICD-10-CM

## 2021-06-30 PROCEDURE — 0502F SUBSEQUENT PRENATAL CARE: CPT | Performed by: OBSTETRICS & GYNECOLOGY

## 2021-06-30 NOTE — ASSESSMENT & PLAN NOTE
- FWB: reassuring by paul today  - Genetic screening: declined  - Anatomy scan: 6/2/21 -- normal anatomy, anterior placenta, nl fluid, CL wnl  - Tdap: plan at 28wks  - PNL: B+/ab-, RI, HIV neg, HepB neg, RPR reactive, Hgb 13.7, UDS neg, VZV non-immune, , GCCT/trich neg, UCx neg    - nurse visit scheduled for GTT in next week given glucosuria today

## 2021-06-30 NOTE — ASSESSMENT & PLAN NOTE
H/o syphilis 2 years, RPR and confirmatory positive with IPV labs.  S/p PCN G x3 doses in early pregnancy    - will repeat testing at next visit

## 2021-06-30 NOTE — PROGRESS NOTES
Return OB Office Visit    CC:   Chief Complaint   Patient presents with    Routine Prenatal Visit       HPI:  Pt seen and examined. No concerns/complaints. Denies VB, LOF, cramps/ctx. +FM. Has been feeling tired, nothing out of the ordinary. Does feel like the baby is low and has some shooting pains in her vagina occasionally. Swelling has continued since last visit, usually really bad by the end of the day. Worse on the left than the right. No redness, occasional tenderness from swelling. Objective:  /76   Pulse 102   Wt 270 lb 9.6 oz (122.7 kg)   LMP 01/13/2021   BMI 46.45 kg/m²   Gen: AO, NAD  Abd: Soft, NT  FHT: 140  FH: 24cm    Assessment/Plan:  27 y.o. [id number] at 24w0d (Estimated Date of Delivery: 10/20/21) presents for NAT appointment:     Problem List Items Addressed This Visit        Gynecology/Obstetric Problems    Herpes genitalia     Will need prophylaxis at 36 weeks          Prenatal care in second trimester - Primary     - FWB: reassuring by paul today  - Genetic screening: declined  - Anatomy scan: 6/2/21 -- normal anatomy, anterior placenta, nl fluid, CL wnl  - Tdap: plan at 28wks  - PNL: B+/ab-, RI, HIV neg, HepB neg, RPR reactive, Hgb 13.7, UDS neg, VZV non-immune, , GCCT/trich neg, UCx neg    - nurse visit scheduled for GTT in next week given glucosuria today          Acquired syphilis     H/o syphilis 2 years, RPR and confirmatory positive with IPV labs.  S/p PCN G x3 doses in early pregnancy    - will repeat testing at next visit             Other    Smoking      Other Visit Diagnoses     Swelling of lower extremity during pregnancy in second trimester        - LE ultrasound ordered, compression stockings, hydration    Relevant Orders    US DUP LOWER EXTREMITY LEFT JADYN          Dispo: RTC in 1 week for GTT, 4 weeks for office visit  Daphne Villarreal MD

## 2021-07-06 ENCOUNTER — NURSE ONLY (OUTPATIENT)
Dept: OBGYN CLINIC | Age: 26
End: 2021-07-06
Payer: COMMERCIAL

## 2021-07-06 VITALS
HEART RATE: 95 BPM | BODY MASS INDEX: 46.72 KG/M2 | DIASTOLIC BLOOD PRESSURE: 60 MMHG | SYSTOLIC BLOOD PRESSURE: 116 MMHG | WEIGHT: 272.2 LBS

## 2021-07-06 DIAGNOSIS — Z34.92 PRENATAL CARE IN SECOND TRIMESTER: Primary | ICD-10-CM

## 2021-07-06 LAB
BASOPHILS ABSOLUTE: 0 K/UL (ref 0–0.2)
BASOPHILS RELATIVE PERCENT: 0.4 %
EOSINOPHILS ABSOLUTE: 0.1 K/UL (ref 0–0.6)
EOSINOPHILS RELATIVE PERCENT: 0.9 %
GLUCOSE CHALLENGE: 206 MG/DL
HCT VFR BLD CALC: 37 % (ref 36–48)
HEMOGLOBIN: 12.5 G/DL (ref 12–16)
LYMPHOCYTES ABSOLUTE: 1.7 K/UL (ref 1–5.1)
LYMPHOCYTES RELATIVE PERCENT: 18.3 %
MCH RBC QN AUTO: 30.3 PG (ref 26–34)
MCHC RBC AUTO-ENTMCNC: 33.9 G/DL (ref 31–36)
MCV RBC AUTO: 89.7 FL (ref 80–100)
MONOCYTES ABSOLUTE: 0.3 K/UL (ref 0–1.3)
MONOCYTES RELATIVE PERCENT: 3.6 %
NEUTROPHILS ABSOLUTE: 7 K/UL (ref 1.7–7.7)
NEUTROPHILS RELATIVE PERCENT: 76.8 %
PDW BLD-RTO: 14 % (ref 12.4–15.4)
PLATELET # BLD: 199 K/UL (ref 135–450)
PMV BLD AUTO: 8.5 FL (ref 5–10.5)
RBC # BLD: 4.12 M/UL (ref 4–5.2)
WBC # BLD: 9.1 K/UL (ref 4–11)

## 2021-07-06 PROCEDURE — 36415 COLL VENOUS BLD VENIPUNCTURE: CPT | Performed by: OBSTETRICS & GYNECOLOGY

## 2021-07-06 NOTE — PROGRESS NOTES
Blood draw from R Cephalic x 1 attempt without difficulty. 0 SST, 1 PST, 1 LAV tubes drawn. Patient tolerated well.  Fredi Hernandez LPN

## 2021-07-07 ENCOUNTER — HOSPITAL ENCOUNTER (OUTPATIENT)
Dept: VASCULAR LAB | Age: 26
Discharge: HOME | End: 2021-07-07
Payer: COMMERCIAL

## 2021-07-07 DIAGNOSIS — O12.02 SWELLING OF LOWER EXTREMITY DURING PREGNANCY IN SECOND TRIMESTER: ICD-10-CM

## 2021-07-07 PROCEDURE — 93971 EXTREMITY STUDY: CPT

## 2021-07-28 ENCOUNTER — ROUTINE PRENATAL (OUTPATIENT)
Dept: OBGYN CLINIC | Age: 26
End: 2021-07-28
Payer: COMMERCIAL

## 2021-07-28 VITALS
SYSTOLIC BLOOD PRESSURE: 112 MMHG | DIASTOLIC BLOOD PRESSURE: 68 MMHG | HEART RATE: 102 BPM | BODY MASS INDEX: 45.8 KG/M2 | WEIGHT: 266.8 LBS

## 2021-07-28 DIAGNOSIS — O24.414 INSULIN CONTROLLED GESTATIONAL DIABETES MELLITUS (GDM) IN THIRD TRIMESTER: ICD-10-CM

## 2021-07-28 DIAGNOSIS — F41.9 ANXIETY AND DEPRESSION: ICD-10-CM

## 2021-07-28 DIAGNOSIS — F32.A ANXIETY AND DEPRESSION: ICD-10-CM

## 2021-07-28 DIAGNOSIS — A53.9 ACQUIRED SYPHILIS: ICD-10-CM

## 2021-07-28 DIAGNOSIS — A60.00 GENITAL HERPES SIMPLEX, UNSPECIFIED SITE: ICD-10-CM

## 2021-07-28 DIAGNOSIS — Z34.93 PRENATAL CARE IN THIRD TRIMESTER: Primary | ICD-10-CM

## 2021-07-28 PROCEDURE — 36415 COLL VENOUS BLD VENIPUNCTURE: CPT | Performed by: OBSTETRICS & GYNECOLOGY

## 2021-07-28 PROCEDURE — 0502F SUBSEQUENT PRENATAL CARE: CPT | Performed by: OBSTETRICS & GYNECOLOGY

## 2021-07-28 NOTE — PROGRESS NOTES
Return OB Office Visit    CC:   Chief Complaint   Patient presents with    Routine Prenatal Visit       HPI:  Pt seen and examined. No concerns/complaints. Denies VB, LOF, ctx. +FM. Since last visit has been checking BG levels (1hr ). - Fastin, 124, 126, 124, 122, 135  - Postprandial (1hr): majority less than 140 however does still have elevations postprandial as well. Maternal wellness questionnaire reviewed - no concerns today. Objective:  /68   Pulse 102   Wt 266 lb 12.8 oz (121 kg)   LMP 2021   BMI 45.80 kg/m²   Gen: AO, NAD  Abd: Soft, NT  FHT: 140  FH: 28cm    Assessment/Plan:  27 y.o. [id number] at 28w0d (Estimated Date of Delivery: 10/20/21) presents for NAT appointment:     Problem List Items Addressed This Visit        Gynecology/Obstetric Problems    Herpes genitalia     Will need prophylaxis at 36 weeks          Prenatal care in third trimester - Primary     - FWB: reassuring by paul today  - Genetic screening: declined  - Anatomy scan: 21 -- normal anatomy, anterior placenta, nl fluid, CL wnl  - Tdap: plan at 28wks  - PNL: B+/ab-, RI, HIV neg, HepB neg, RPR reactive, Hgb 13.7, UDS neg, VZV non-immune, , GCCT/trich neg, UCx neg   - 1hr , Hgb 12.5, Plt 199          Relevant Orders    Syphilis Antibody Cascading Reflex    Acquired syphilis     H/o syphilis 2 years, RPR and confirmatory positive with IPV labs.  S/p PCN G x3 doses in early pregnancy    - repeat testing sent today             Other    Anxiety and depression    Insulin controlled gestational diabetes mellitus (GDM) in third trimester     Patient with 1hr   - has been checking BG since last visit with globally elevated fasting and some PP elevations as well  - order placed for diabetes education  - start NPH 20 units before breakfast and 15 units at bedtime, will come in for insulin teaching tomorrow and to review BG logs next week  - Plan q4wk growth US,  testing at 32 weeks Relevant Medications    insulin NPH (HUMULIN N) 100 UNIT/ML injection vial    Other Relevant Orders    [facility], [facility]          Dispo: RTC in 1 week  Arminda Day MD

## 2021-07-28 NOTE — ASSESSMENT & PLAN NOTE
Patient with 1hr   - has been checking BG since last visit with globally elevated fasting and some PP elevations as well  - order placed for diabetes education  - start NPH 20 units before breakfast and 15 units at bedtime, will come in for insulin teaching tomorrow and to review BG logs next week  - Plan q4wk growth US,  testing at 32 weeks

## 2021-07-28 NOTE — ASSESSMENT & PLAN NOTE
H/o syphilis 2 years, RPR and confirmatory positive with IPV labs.  S/p PCN G x3 doses in early pregnancy    - repeat testing sent today

## 2021-07-28 NOTE — ASSESSMENT & PLAN NOTE
- FWB: reassuring by paul today  - Genetic screening: declined  - Anatomy scan: 6/2/21 -- normal anatomy, anterior placenta, nl fluid, CL wnl  - Tdap: plan at 28wks  - PNL: B+/ab-, RI, HIV neg, HepB neg, RPR reactive, Hgb 13.7, UDS neg, VZV non-immune, , GCCT/trich neg, UCx neg   - 1hr , Hgb 12.5, Plt 199

## 2021-07-29 LAB
RPR CONFIRMATORY: REACTIVE
RPR TITER: NORMAL
TOTAL SYPHILLIS IGG/IGM: REACTIVE

## 2021-08-03 ENCOUNTER — HOSPITAL ENCOUNTER (OUTPATIENT)
Dept: DIABETES SERVICES | Age: 26
Setting detail: THERAPIES SERIES
Discharge: HOME | End: 2021-08-03
Payer: COMMERCIAL

## 2021-08-03 DIAGNOSIS — O24.419 GESTATIONAL DIABETES MELLITUS (GDM), ANTEPARTUM, GESTATIONAL DIABETES METHOD OF CONTROL UNSPECIFIED: Primary | ICD-10-CM

## 2021-08-03 PROCEDURE — G0109 DIAB MANAGE TRN IND/GROUP: HCPCS

## 2021-08-10 ENCOUNTER — HOSPITAL ENCOUNTER (OUTPATIENT)
Dept: DIABETES SERVICES | Age: 26
Setting detail: THERAPIES SERIES
Discharge: HOME | End: 2021-08-10
Payer: COMMERCIAL

## 2021-08-10 DIAGNOSIS — O24.419 GESTATIONAL DIABETES MELLITUS (GDM), ANTEPARTUM, GESTATIONAL DIABETES METHOD OF CONTROL UNSPECIFIED: Primary | ICD-10-CM

## 2021-08-10 PROCEDURE — G0109 DIAB MANAGE TRN IND/GROUP: HCPCS

## 2021-08-10 NOTE — LETTER
Diabetes Education  [facility])    TO: Annamaria Maldonado    RE: Reyna ESTRADAO.B.: 1995    Your patient attended a Virtual Follow-up session for Gestational Diabetes on 8/10/2021. Education included the following:   [x] Review of BG Log   Fasting BG Range: mg/dl     1 hr PP BG Range:  mg/dl      2 hr PP BG Range:                                                             [x] Review of Food Log   [] Additional Resources  [x]  Diet guidelines for sick days  [x] Post-Partum Guidelines  [] Other:    Post Program Recommendations include:  [x] Diabetes Screening 6 - 12 weeks post partum  [] Pre-Diabetes Group Class   [] Other : Thank you for the opportunity to provide Diabetes Education to your patient.     Amina Waters RD, LD    [facility]) Diabetes Educator

## 2021-08-11 ENCOUNTER — OFFICE VISIT (OUTPATIENT)
Dept: OBGYN CLINIC | Age: 26
End: 2021-08-11
Payer: COMMERCIAL

## 2021-08-11 ENCOUNTER — ROUTINE PRENATAL (OUTPATIENT)
Dept: OBGYN CLINIC | Age: 26
End: 2021-08-11

## 2021-08-11 VITALS
DIASTOLIC BLOOD PRESSURE: 74 MMHG | BODY MASS INDEX: 45.8 KG/M2 | SYSTOLIC BLOOD PRESSURE: 116 MMHG | HEART RATE: 102 BPM | WEIGHT: 266.8 LBS

## 2021-08-11 DIAGNOSIS — A53.9 ACQUIRED SYPHILIS: ICD-10-CM

## 2021-08-11 DIAGNOSIS — O24.414 INSULIN CONTROLLED GESTATIONAL DIABETES MELLITUS (GDM) IN THIRD TRIMESTER: ICD-10-CM

## 2021-08-11 DIAGNOSIS — O24.414 INSULIN CONTROLLED GESTATIONAL DIABETES MELLITUS (GDM) IN THIRD TRIMESTER: Primary | ICD-10-CM

## 2021-08-11 DIAGNOSIS — Z34.93 PRENATAL CARE IN THIRD TRIMESTER: Primary | ICD-10-CM

## 2021-08-11 DIAGNOSIS — A60.00 HERPES SIMPLEX INFECTION OF GENITOURINARY SYSTEM: ICD-10-CM

## 2021-08-11 PROCEDURE — 76816 OB US FOLLOW-UP PER FETUS: CPT | Performed by: OBSTETRICS & GYNECOLOGY

## 2021-08-11 PROCEDURE — 0502F SUBSEQUENT PRENATAL CARE: CPT | Performed by: OBSTETRICS & GYNECOLOGY

## 2021-08-11 NOTE — ASSESSMENT & PLAN NOTE
H/o syphilis 2 years, RPR and confirmatory positive with IPV labs.  S/p PCN G x3 doses in early pregnancy    - repeat testing with 1:2 titers, will not treat at this time

## 2021-08-11 NOTE — ASSESSMENT & PLAN NOTE
- FWB: reassuring by US today  - Genetic screening: declined  - Anatomy scan: 6/2/21 -- normal anatomy, anterior placenta, nl fluid, CL wnl  - Tdap: plan at 28wks  - PNL: B+/ab-, RI, HIV neg, HepB neg, RPR reactive, Hgb 13.7, UDS neg, VZV non-immune, , GCCT/trich neg, UCx neg   - 1hr , Hgb 12.5, Plt 199

## 2021-08-11 NOTE — ASSESSMENT & PLAN NOTE
Patient with 1hr   - NPH increased to 20 units BID  - s/p DM education, continue qid accuchecks  - Plan q4wk growth US,  testing at 32 weeks (2x/week)    - US : 1952g (97%ile)

## 2021-08-20 ENCOUNTER — ROUTINE PRENATAL (OUTPATIENT)
Dept: OBGYN CLINIC | Age: 26
End: 2021-08-20

## 2021-08-20 VITALS
DIASTOLIC BLOOD PRESSURE: 70 MMHG | BODY MASS INDEX: 45.69 KG/M2 | SYSTOLIC BLOOD PRESSURE: 110 MMHG | HEART RATE: 102 BPM | WEIGHT: 266.2 LBS

## 2021-08-20 DIAGNOSIS — Z87.59 HISTORY OF SHOULDER DYSTOCIA IN PRIOR PREGNANCY: ICD-10-CM

## 2021-08-20 DIAGNOSIS — Z80.49 FAMILY HISTORY OF CANCER OF FEMALE GENITAL ORGAN: ICD-10-CM

## 2021-08-20 DIAGNOSIS — O24.414 INSULIN CONTROLLED GESTATIONAL DIABETES MELLITUS (GDM) IN THIRD TRIMESTER: ICD-10-CM

## 2021-08-20 DIAGNOSIS — Z34.93 PRENATAL CARE IN THIRD TRIMESTER: Primary | ICD-10-CM

## 2021-08-20 DIAGNOSIS — A60.00 HERPES SIMPLEX INFECTION OF GENITOURINARY SYSTEM: ICD-10-CM

## 2021-08-20 DIAGNOSIS — A53.9 ACQUIRED SYPHILIS: ICD-10-CM

## 2021-08-20 PROCEDURE — 0502F SUBSEQUENT PRENATAL CARE: CPT | Performed by: OBSTETRICS & GYNECOLOGY

## 2021-08-20 NOTE — PROGRESS NOTES
Maternal emotional well being screening form completed and reviewed with patient. Current score is 5.    Temp.97.8

## 2021-08-20 NOTE — PROGRESS NOTES
Return OB Office Visit    CC:   Chief Complaint   Patient presents with    Routine Prenatal Visit       HPI:  Pt seen and examined. No concerns/complaints. Denies VB, LOF, ctx. +FM. Checking BG qid, see scanned logs. Overall reasonably controlled with current insulin regimen (NPH 20U BID). Occasional elevated PP but overall well controlled. Maternal wellness questionnaire reviewed - no concerns today. Score 5. Objective:  /70   Pulse 102   Wt 266 lb 3.2 oz (120.7 kg)   LMP 2021   BMI 45.69 kg/m²   Gen: AO, NAD  Abd: Soft, NT  FHT: 149  FH: 34cm, vertex by Leopolds  Ext: Mild LE edema    Assessment/Plan:  27 y.o. [id number] at 31w2d (Estimated Date of Delivery: 10/20/21) presents for NAT appointment:     Problem List Items Addressed This Visit        Gynecology/Obstetric Problems    Herpes genitalia     Will need prophylaxis at 36 weeks          Prenatal care in third trimester - Primary     - FWB: reassuring by paul today  - Genetic screening: declined  - Anatomy scan: 21 -- normal anatomy, anterior placenta, nl fluid, CL wnl  - Tdap: plan at 28wks  - PNL: B+/ab-, RI, HIV neg, HepB neg, RPR reactive, Hgb 13.7, UDS neg, VZV non-immune, , GCCT/trich neg, UCx neg   - 1hr , Hgb 12.5, Plt 199          Acquired syphilis     H/o syphilis 2 years, RPR and confirmatory positive with IPV labs. S/p PCN G x3 doses in early pregnancy    - repeat testing with 1:2 titers, will not treat at this time.  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             Other    Insulin controlled gestational diabetes mellitus (GDM) in third trimester     Patient with 1hr   - NPH increased to 20 units BID  - s/p DM education, continue qid accuchecks  - Plan q4wk growth US,  testing at 32 weeks (2x/week)    - US : 1952g (97%ile)          History of shoulder dystocia in prior pregnancy     Patient with h/o shoulder dystocia in G2 pregnancy, infant weighed 11lb 3.4oz and had shoulder dystocia resulting in clavicle fracture  - discussed delivery options, given LGA infant and GDM this pregnancy, pt would like to proceed with PLTCD for delivery          Family history of cancer of female genital organ     Desires prophylactic salpingectomy at time of , will discuss with ethics                Dispo: RTC in 1 week, start  testing  Kunal Hewitt MD

## 2021-08-24 PROBLEM — Z80.49 FAMILY HISTORY OF CANCER OF FEMALE GENITAL ORGAN: Status: ACTIVE | Noted: 2021-08-24

## 2021-08-24 PROBLEM — Z87.59 HISTORY OF SHOULDER DYSTOCIA IN PRIOR PREGNANCY: Status: ACTIVE | Noted: 2021-08-24

## 2021-08-24 NOTE — ASSESSMENT & PLAN NOTE
Patient with h/o shoulder dystocia in G2 pregnancy, infant weighed 11lb 3.4oz and had shoulder dystocia resulting in clavicle fracture  - discussed delivery options, given LGA infant and GDM this pregnancy, pt would like to proceed with PLTCD for delivery

## 2021-08-24 NOTE — ASSESSMENT & PLAN NOTE
H/o syphilis 2 years, RPR and confirmatory positive with IPV labs. S/p PCN G x3 doses in early pregnancy    - repeat testing with 1:2 titers, will not treat at this time.  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

## 2021-08-25 ENCOUNTER — ROUTINE PRENATAL (OUTPATIENT)
Dept: OBGYN CLINIC | Age: 26
End: 2021-08-25

## 2021-08-25 ENCOUNTER — OFFICE VISIT (OUTPATIENT)
Dept: OBGYN CLINIC | Age: 26
End: 2021-08-25
Payer: COMMERCIAL

## 2021-08-25 VITALS
WEIGHT: 264 LBS | SYSTOLIC BLOOD PRESSURE: 106 MMHG | DIASTOLIC BLOOD PRESSURE: 64 MMHG | BODY MASS INDEX: 45.32 KG/M2 | HEART RATE: 88 BPM

## 2021-08-25 DIAGNOSIS — Z34.93 PRENATAL CARE IN THIRD TRIMESTER: Primary | ICD-10-CM

## 2021-08-25 DIAGNOSIS — O24.414 INSULIN CONTROLLED GESTATIONAL DIABETES MELLITUS (GDM) IN THIRD TRIMESTER: ICD-10-CM

## 2021-08-25 DIAGNOSIS — Z87.59 HISTORY OF SHOULDER DYSTOCIA IN PRIOR PREGNANCY: ICD-10-CM

## 2021-08-25 DIAGNOSIS — A60.00 HERPES SIMPLEX INFECTION OF GENITOURINARY SYSTEM: ICD-10-CM

## 2021-08-25 DIAGNOSIS — O24.414 INSULIN CONTROLLED GESTATIONAL DIABETES MELLITUS (GDM) IN THIRD TRIMESTER: Primary | ICD-10-CM

## 2021-08-25 DIAGNOSIS — A53.9 ACQUIRED SYPHILIS: ICD-10-CM

## 2021-08-25 DIAGNOSIS — Z80.49 FAMILY HISTORY OF CANCER OF FEMALE GENITAL ORGAN: ICD-10-CM

## 2021-08-25 PROCEDURE — 0502F SUBSEQUENT PRENATAL CARE: CPT | Performed by: OBSTETRICS & GYNECOLOGY

## 2021-08-25 PROCEDURE — 76818 FETAL BIOPHYS PROFILE W/NST: CPT | Performed by: OBSTETRICS & GYNECOLOGY

## 2021-08-25 NOTE — PROGRESS NOTES
Return OB Office Visit    CC:   Chief Complaint   Patient presents with    Routine Prenatal Visit       HPI:  Pt seen and examined. No concerns/complaints. Denies VB, LOF, ctx. +FM. Maternal wellness questionnaire reviewed - no concerns today. Score 5. Objective:  /64   Pulse 88   Wt 264 lb (119.7 kg)   LMP 2021   BMI 45.32 kg/m²   Gen: AO, NAD  Abd: Soft, NT  FHT: 140    Assessment/Plan:  27 y.o. [id number] at 32w0d (Estimated Date of Delivery: 10/20/21) presents for NAT appointment:     Problem List Items Addressed This Visit        Gynecology/Obstetric Problems    Herpes genitalia    Prenatal care in third trimester - Primary     - FWB: reassuring by NST today  - Genetic screening: declined  - Anatomy scan: 21 -- normal anatomy, anterior placenta, nl fluid, CL wnl  - Tdap: plan at 28wks  - PNL: B+/ab-, RI, HIV neg, HepB neg, RPR reactive, Hgb 13.7, UDS neg, VZV non-immune, , GCCT/trich neg, UCx neg   - 1hr , Hgb 12.5, Plt 199          Acquired syphilis     H/o syphilis 2 years, RPR and confirmatory positive with IPV labs. S/p PCN G x3 doses in early pregnancy    - repeat testing with 1:2 titers, will not treat at this time.  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

## 2021-08-26 NOTE — ASSESSMENT & PLAN NOTE
Patient with 1hr   - NPH increased to 20 units qAM and 26 units at bedtime  - s/p DM education, continue qid accuchecks  - Plan q4wk growth US,  testing at 32 weeks (2x/week)    - US : 1952g (97%ile)

## 2021-08-26 NOTE — ASSESSMENT & PLAN NOTE
- FWB: reassuring by NST today  - Genetic screening: declined  - Anatomy scan: 6/2/21 -- normal anatomy, anterior placenta, nl fluid, CL wnl  - Tdap: plan at 28wks  - PNL: B+/ab-, RI, HIV neg, HepB neg, RPR reactive, Hgb 13.7, UDS neg, VZV non-immune, , GCCT/trich neg, UCx neg   - 1hr , Hgb 12.5, Plt 199

## 2021-08-31 ENCOUNTER — ROUTINE PRENATAL (OUTPATIENT)
Dept: OBGYN CLINIC | Age: 26
End: 2021-08-31

## 2021-08-31 ENCOUNTER — OFFICE VISIT (OUTPATIENT)
Dept: OBGYN CLINIC | Age: 26
End: 2021-08-31
Payer: COMMERCIAL

## 2021-08-31 VITALS
SYSTOLIC BLOOD PRESSURE: 114 MMHG | DIASTOLIC BLOOD PRESSURE: 58 MMHG | BODY MASS INDEX: 44.9 KG/M2 | HEART RATE: 101 BPM | WEIGHT: 261.6 LBS

## 2021-08-31 DIAGNOSIS — O24.414 INSULIN CONTROLLED GESTATIONAL DIABETES MELLITUS (GDM) IN THIRD TRIMESTER: ICD-10-CM

## 2021-08-31 DIAGNOSIS — Z34.93 PRENATAL CARE IN THIRD TRIMESTER: Primary | ICD-10-CM

## 2021-08-31 DIAGNOSIS — Z87.59 HISTORY OF SHOULDER DYSTOCIA IN PRIOR PREGNANCY: ICD-10-CM

## 2021-08-31 DIAGNOSIS — Z80.49 FAMILY HISTORY OF CANCER OF FEMALE GENITAL ORGAN: ICD-10-CM

## 2021-08-31 DIAGNOSIS — A53.9 ACQUIRED SYPHILIS: ICD-10-CM

## 2021-08-31 DIAGNOSIS — R60.0 LEG EDEMA: ICD-10-CM

## 2021-08-31 DIAGNOSIS — O24.414 INSULIN CONTROLLED GESTATIONAL DIABETES MELLITUS (GDM) IN THIRD TRIMESTER: Primary | ICD-10-CM

## 2021-08-31 DIAGNOSIS — A60.00 HERPES SIMPLEX INFECTION OF GENITOURINARY SYSTEM: ICD-10-CM

## 2021-08-31 PROCEDURE — 76818 FETAL BIOPHYS PROFILE W/NST: CPT | Performed by: OBSTETRICS & GYNECOLOGY

## 2021-08-31 PROCEDURE — 0502F SUBSEQUENT PRENATAL CARE: CPT | Performed by: OBSTETRICS & GYNECOLOGY

## 2021-08-31 NOTE — PROGRESS NOTES
Temp 97.8 infrared    Maternal emotional well being screening form completed and reviewed with patient. Current score is 3.

## 2021-09-02 NOTE — PROGRESS NOTES
Return OB Office Visit    CC:   Chief Complaint   Patient presents with    Routine Prenatal Visit       HPI:  Patient seen and examined. Patient is doing well today without complaints. Reports improved glucose levels with increase of PM insulin, however fasting still remain elevated. Denies VB, LOF, ctx. +FM. Denies headaches, vision changes, RUQ pain. Reports globally increasing LE edema. 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 Is planning on utilizing compression hose. Denies chest pain, shortness of breath, fever, chills, nausea, vomiting. Review of Systems: The following ROS was otherwise negative, except as noted in the HPI: constitutional, HEENT, respiratory, cardiovascular, gastrointestinal, genitourinary, skin, musculoskeletal, neurological, psych    Objective:  BP (!) 114/58   Pulse 101   Wt 261 lb 9.6 oz (118.7 kg)   LMP 01/13/2021   BMI 44.90 kg/m²     Physical Exam  Vitals reviewed. Constitutional:       General: She is not in acute distress. Appearance: She is well-developed. HENT:      Head: Normocephalic and atraumatic. Eyes:      Conjunctiva/sclera: Conjunctivae normal.   Cardiovascular:      Rate and Rhythm: Normal rate. Pulmonary:      Effort: Pulmonary effort is normal. No respiratory distress. Abdominal:      General: There is no distension. Palpations: Abdomen is soft. Tenderness: There is no abdominal tenderness. There is no guarding or rebound. Musculoskeletal:         General: Swelling (bilateral pedal) present. Skin:     General: Skin is warm and dry. Neurological:      Mental Status: She is alert and oriented to person, place, and time. Psychiatric:         Mood and Affect: Mood normal.         Behavior: Behavior normal.         Thought Content: Thought content normal.         Ultrasound  OB ULTRASOUND: BRUNO    DATE: 08/31/2021    PHYSICIAN: ELICEO Tubbs    SONOGRAPHER: Enrique Braga [facility]    INDICATION: Fetal well being    TYPE OF SCAN: abdominal    FINDINGS:  A single viable intrauterine pregnancy is noted in cephalic presentation. Cardiac and somatic activity are noted. The following values were obtained:   Fetal heart rate 130bpm   Amniotic fluid index 16.48cm    NST:     Baseline: 135  Variability: moderate in degree  Accelerations: Present  Decelerations: Absent                     [place]: Contractions are absent    Category 1    Reactive: Yes     IMPRESSION:   Single live IUP in the third trimester. BRUNO 16.48cm. NST Reactive. Imaging is limited secondary to fetal position. The patient is well aware of the limitations of ultrasound in the detection of anomalies. Assessment/Plan:   Rakan Aly is a 27 y.o. [id number] at [address] [place] [address] who presents for routine OB visit    1. Prenatal care in third trimester     - Patient is doing well today without complaints     - Fetal wellbeing reassuring by NST/BRUNO today     - Maternal wellness questionnaire reviewed - no concerns today, score 3     - Labor, decreased FM, VB, LOF precautions reviewed      - Return precautions reviewed     - Will continue twice weekly  testing    2. Insulin controlled gestational diabetes mellitus (GDM) in third trimester     - Patient with 1 hr      - NPH increased to 20 units in AM and 28 units at bedtime     - s/p DM education, continue qid Accuchecks     - EFW : 1952g (97%tile)     - Continue q4wk growth US and twice weekly  testing    3. Leg edema     - Normotensive today     - Reviewed compression hose and monitoring     - Reviewed adequate hydration and walks through the day    4. Acquired syphilis     - H/o syphilis 2 years, RPR and confirmatory positive with IPV labs. S/p PCN G x3 doses in early pregnancy       - repeat testing with 1:2 titers, will not treat at this time. 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  Herpes simplex infection of genitourinary system     - Will need prophylaxis at 36 weeks    6. History of shoulder dystocia in prior pregnancy     - Patient with h/o shoulder dystocia in G2 pregnancy, infant weighed 11lb 3.4oz and had shoulder dystocia resulting in clavicle fracture     - discussed delivery options, given LGA infant and GDM this pregnancy, pt would like to proceed with PLTCD for delivery    7.  Family history of cancer of female genital organ     - Desires prophylactic salpingectomy at time of       - Ethics consult pending      Narciso Jaramillo DO

## 2021-09-03 ENCOUNTER — ROUTINE PRENATAL (OUTPATIENT)
Dept: OBGYN CLINIC | Age: 26
End: 2021-09-03

## 2021-09-03 ENCOUNTER — OFFICE VISIT (OUTPATIENT)
Dept: OBGYN CLINIC | Age: 26
End: 2021-09-03
Payer: COMMERCIAL

## 2021-09-03 VITALS
WEIGHT: 263 LBS | BODY MASS INDEX: 45.14 KG/M2 | HEART RATE: 105 BPM | DIASTOLIC BLOOD PRESSURE: 66 MMHG | SYSTOLIC BLOOD PRESSURE: 122 MMHG

## 2021-09-03 DIAGNOSIS — A60.00 HERPES SIMPLEX INFECTION OF GENITOURINARY SYSTEM: ICD-10-CM

## 2021-09-03 DIAGNOSIS — O24.414 INSULIN CONTROLLED GESTATIONAL DIABETES MELLITUS (GDM) IN THIRD TRIMESTER: ICD-10-CM

## 2021-09-03 DIAGNOSIS — A53.9 ACQUIRED SYPHILIS: ICD-10-CM

## 2021-09-03 DIAGNOSIS — Z87.59 HISTORY OF SHOULDER DYSTOCIA IN PRIOR PREGNANCY: ICD-10-CM

## 2021-09-03 DIAGNOSIS — Z80.49 FAMILY HISTORY OF CANCER OF FEMALE GENITAL ORGAN: ICD-10-CM

## 2021-09-03 DIAGNOSIS — O28.8 NST (NON-STRESS TEST) NONREACTIVE: Primary | ICD-10-CM

## 2021-09-03 DIAGNOSIS — F17.200 SMOKING: ICD-10-CM

## 2021-09-03 DIAGNOSIS — Z34.93 PRENATAL CARE IN THIRD TRIMESTER: Primary | ICD-10-CM

## 2021-09-03 PROCEDURE — 0502F SUBSEQUENT PRENATAL CARE: CPT | Performed by: OBSTETRICS & GYNECOLOGY

## 2021-09-03 PROCEDURE — 76818 FETAL BIOPHYS PROFILE W/NST: CPT | Performed by: OBSTETRICS & GYNECOLOGY

## 2021-09-03 NOTE — PROGRESS NOTES
Return OB Office Visit    CC:   Chief Complaint   Patient presents with    Routine Prenatal Visit       HPI:  Pt seen and examined. No concerns/complaints. Denies VB, LOF, ctx. +FM    BG:  Fasting 78 (felt bad), 91/131/140 (had bread)  Fasting 105/75 (checked immediately after one another)    Objective:  /66   Pulse 105   Wt 263 lb (119.3 kg)   LMP 01/13/2021   BMI 45.14 kg/m²   Gen: AO, NAD  Abd: Soft, NT  FHT: 130    Assessment/Plan:  27 y.o. [id number] at 33w2d (Estimated Date of Delivery: 10/20/21) presents for NAT appointment:      Diagnosis Orders   1. Prenatal care in third trimester     2. Smoking     3. Insulin controlled gestational diabetes mellitus (GDM) in third trimester     4. Herpes simplex infection of genitourinary system     5. History of shoulder dystocia in prior pregnancy     6. Family history of cancer of female genital organ     7. Acquired syphilis       Doing well, routine care  - initial NST non-reactive, BPP 6/8. Repeat NST reactive with audible fetal hiccups appreciated  - keep current insulin regimen, reassess next week  - plan PLTCD for macrosomia and h/o shoulder dystocia, will schedule in next week.   - ethics consult for prophylactic salpingectomy pending    Dispo: RTC in 3-4 days, NST/BRUNO Lund MD

## 2021-09-07 ENCOUNTER — OFFICE VISIT (OUTPATIENT)
Dept: OBGYN CLINIC | Age: 26
End: 2021-09-07
Payer: COMMERCIAL

## 2021-09-07 ENCOUNTER — ROUTINE PRENATAL (OUTPATIENT)
Dept: OBGYN CLINIC | Age: 26
End: 2021-09-07
Payer: COMMERCIAL

## 2021-09-07 VITALS
BODY MASS INDEX: 44.87 KG/M2 | WEIGHT: 261.4 LBS | HEART RATE: 101 BPM | SYSTOLIC BLOOD PRESSURE: 126 MMHG | DIASTOLIC BLOOD PRESSURE: 76 MMHG

## 2021-09-07 DIAGNOSIS — O24.414 INSULIN CONTROLLED GESTATIONAL DIABETES MELLITUS (GDM) IN THIRD TRIMESTER: ICD-10-CM

## 2021-09-07 DIAGNOSIS — A60.00 HERPES SIMPLEX INFECTION OF GENITOURINARY SYSTEM: ICD-10-CM

## 2021-09-07 DIAGNOSIS — Z80.49 FAMILY HISTORY OF CANCER OF FEMALE GENITAL ORGAN: ICD-10-CM

## 2021-09-07 DIAGNOSIS — O24.414 INSULIN CONTROLLED GESTATIONAL DIABETES MELLITUS (GDM) IN THIRD TRIMESTER: Primary | ICD-10-CM

## 2021-09-07 DIAGNOSIS — A53.9 ACQUIRED SYPHILIS: ICD-10-CM

## 2021-09-07 DIAGNOSIS — F17.200 SMOKING: ICD-10-CM

## 2021-09-07 DIAGNOSIS — Z87.59 HISTORY OF SHOULDER DYSTOCIA IN PRIOR PREGNANCY: ICD-10-CM

## 2021-09-07 DIAGNOSIS — Z34.93 PRENATAL CARE IN THIRD TRIMESTER: Primary | ICD-10-CM

## 2021-09-07 DIAGNOSIS — Z87.898 HISTORY OF BIRTH TRAUMA: ICD-10-CM

## 2021-09-07 PROCEDURE — 59025 FETAL NON-STRESS TEST: CPT | Performed by: OBSTETRICS & GYNECOLOGY

## 2021-09-07 PROCEDURE — 76818 FETAL BIOPHYS PROFILE W/NST: CPT | Performed by: OBSTETRICS & GYNECOLOGY

## 2021-09-07 PROCEDURE — 0502F SUBSEQUENT PRENATAL CARE: CPT | Performed by: OBSTETRICS & GYNECOLOGY

## 2021-09-07 NOTE — PROGRESS NOTES
Temp-97.8F infrared  Maternal emotional well being screening form completed and reviewed with patient. Current score is 0. Patient given referral to [address] [place] [address] ([phone]):  No

## 2021-09-10 ENCOUNTER — ROUTINE PRENATAL (OUTPATIENT)
Dept: OBGYN CLINIC | Age: 26
End: 2021-09-10
Payer: COMMERCIAL

## 2021-09-10 VITALS
HEART RATE: 93 BPM | SYSTOLIC BLOOD PRESSURE: 110 MMHG | DIASTOLIC BLOOD PRESSURE: 60 MMHG | BODY MASS INDEX: 44.85 KG/M2 | WEIGHT: 261.3 LBS

## 2021-09-10 DIAGNOSIS — A53.9 ACQUIRED SYPHILIS: ICD-10-CM

## 2021-09-10 DIAGNOSIS — Z87.59 HISTORY OF SHOULDER DYSTOCIA IN PRIOR PREGNANCY: ICD-10-CM

## 2021-09-10 DIAGNOSIS — A60.00 HERPES SIMPLEX INFECTION OF GENITOURINARY SYSTEM: ICD-10-CM

## 2021-09-10 DIAGNOSIS — F41.9 ANXIETY AND DEPRESSION: ICD-10-CM

## 2021-09-10 DIAGNOSIS — O24.414 INSULIN CONTROLLED GESTATIONAL DIABETES MELLITUS (GDM) IN THIRD TRIMESTER: ICD-10-CM

## 2021-09-10 DIAGNOSIS — Z34.93 PRENATAL CARE IN THIRD TRIMESTER: Primary | ICD-10-CM

## 2021-09-10 DIAGNOSIS — Z80.49 FAMILY HISTORY OF CANCER OF FEMALE GENITAL ORGAN: ICD-10-CM

## 2021-09-10 DIAGNOSIS — F32.A ANXIETY AND DEPRESSION: ICD-10-CM

## 2021-09-10 PROCEDURE — 0502F SUBSEQUENT PRENATAL CARE: CPT | Performed by: OBSTETRICS & GYNECOLOGY

## 2021-09-10 PROCEDURE — 59025 FETAL NON-STRESS TEST: CPT | Performed by: OBSTETRICS & GYNECOLOGY

## 2021-09-10 NOTE — PROGRESS NOTES
Temp-97.8F infrared  Maternal emotional well being screening form completed and reviewed with patient. Current score is 0. Patient given referral to [address] [place] [address] ([phone]):  No

## 2021-09-10 NOTE — PROGRESS NOTES
Return OB Office Visit    CC:   Chief Complaint   Patient presents with    Routine Prenatal Visit       HPI:  Pt seen and examined. No concerns/complaints. Denies VB, LOF, ctx. +FM. Maternal wellness questionnaire reviewed - no concerns today. Score 0. Objective:  /60   Pulse 93   Wt 261 lb 4.8 oz (118.5 kg)   LMP 01/13/2021   BMI 44.85 kg/m²   Gen: AO, NAD  Abd: Soft, NT  FHT: 135    Assessment/Plan:  27 y.o. [id number] at 34w2d (Estimated Date of Delivery: 10/20/21) presents for NAT appointment:      Diagnosis Orders   1. Prenatal care in third trimester     2. Herpes simplex infection of genitourinary system     3. Acquired syphilis     4. Anxiety and depression     5. Family history of cancer of female genital organ     6. History of shoulder dystocia in prior pregnancy     7.  Insulin controlled gestational diabetes mellitus (GDM) in third trimester       Doing well today, routine care  - FWB reassuring by NST today  - BG overall well controlled, keep current insulin regimen  - plan PLTCD due to delivery trauma last pregnancy, will schedule at next visit  - ethics consult pending    Dispo: RTC in 3-4 days  Ariadna Cartagena MD

## 2021-09-11 ENCOUNTER — TELEPHONE (OUTPATIENT)
Dept: OBGYN CLINIC | Age: 26
End: 2021-09-11

## 2021-09-13 NOTE — PROGRESS NOTES
27 y.o. [id number] at [facility] [address] Estimated Date of Delivery: 10/20/21 here for NAT:     Pt seen and examined. No concerns/complaints. Denies VB, LOF, CTX. Endorses (+) FM. Denies fevers / chills / chest pain / shortness of breath. Denies HA, changes with vision, RUQ pain, edema. MWQ reviewed (Score:0). Objective:  /76   Pulse 101   Wt 261 lb 6.4 oz (118.6 kg)   LMP 01/13/2021   BMI 44.87 kg/m²   Gen: AO, NAD  Abd: Soft, NT, gravid   Ext: Mild LE edema  OMM: Increased lumbar lordosis    NST  Cat 1 reactive     Images  OBSTETRICAL ULTRASOUND GROWTH    DATE: 09/07/2021    PHYSICIAN: ELICEO Miner.     SONOGRAPHER: Mary Ann Funk RDMS    INDICATION: Growth,     TYPE OF SCAN: abdominal    FINDINGS:  A single viable intrauterine pregnancy is noted in cephalic presentation. Cardiac and somatic activity are noted. The following values were obtained:   Fetal heart rate    153bpm   BPD      8.50cm  58.7 %   Head Circumference    32.15cm  75.9 %    Abdominal Circumference   31.81cm  93.6 %   Femur Length     6.29cm  11.5 %   Amniotic fluid index    9.96cm   EFW      2519g  71.0 percentile    Amniotic fluid volume is normal. Based on sonographic criteria the estimated fetal age is 34weeks and 5days with EDC of 10/14/2021. There is a 6 day discordance with the established EDC of 10/20/2021. The patient has an anterior placenta that is adequate distance in relation to the internal cervical os. The evaluation of the lower uterine segment and cervix reveals normal appearing anatomy. The uterus is unremarkable/gravid. Maternal ovaries and adnexae are not well visualized due to the size of the uterus and patient's gravid state. Anatomy seen includes: heart, stomach, kidneys, bladder    IMPRESSION:  Single live IUP in the third trimester. Adequate interval fetal growth. Assessment/Plan:   Diagnosis Orders   1. Prenatal care in third trimester     2.  Insulin controlled gestational diabetes mellitus (GDM) in third trimester  MD FETAL NON-STRESS TEST   3. Smoking     4. Herpes simplex infection of genitourinary system     5. History of shoulder dystocia in prior pregnancy     6. History of birth trauma     7. Family history of cancer of female genital organ     8. Acquired syphilis        - reassuring maternal / fetal status   - Growth today reassuring, EFW 71%ile   - reviewed BS, fasting still elevated -- recently increased to 28 NPH at night and 20 in am, will recheck at next visit for adjustment   - due to hx of birth trauma / shoulder dystocia -- plan for PLTCS and BL Salpingectomy   - pt admits to family hx of female CA -- salpingectomy papers signed today, will discuss with ethics prior to admission   - cont TW  testing, Growth US     Follow Up  Return OB precautions reviewed   Return in about 3 days (around 9/10/2021) for Return OB visit,  Testing. Approximately 20 minutes spent in room counseling patient on condition and coordination of care with over 50% in direct face to face counseling.      Taiwo Braga, DO

## 2021-09-14 ENCOUNTER — ROUTINE PRENATAL (OUTPATIENT)
Dept: OBGYN CLINIC | Age: 26
End: 2021-09-14

## 2021-09-14 ENCOUNTER — OFFICE VISIT (OUTPATIENT)
Dept: OBGYN CLINIC | Age: 26
End: 2021-09-14
Payer: COMMERCIAL

## 2021-09-14 VITALS
SYSTOLIC BLOOD PRESSURE: 118 MMHG | DIASTOLIC BLOOD PRESSURE: 66 MMHG | BODY MASS INDEX: 45.28 KG/M2 | HEART RATE: 98 BPM | WEIGHT: 263.8 LBS

## 2021-09-14 DIAGNOSIS — Z87.59 HISTORY OF SHOULDER DYSTOCIA IN PRIOR PREGNANCY: ICD-10-CM

## 2021-09-14 DIAGNOSIS — O24.414 INSULIN CONTROLLED GESTATIONAL DIABETES MELLITUS (GDM) IN THIRD TRIMESTER: Primary | ICD-10-CM

## 2021-09-14 DIAGNOSIS — A53.9 ACQUIRED SYPHILIS: ICD-10-CM

## 2021-09-14 DIAGNOSIS — F41.9 ANXIETY AND DEPRESSION: Primary | ICD-10-CM

## 2021-09-14 DIAGNOSIS — O24.414 INSULIN CONTROLLED GESTATIONAL DIABETES MELLITUS (GDM) IN THIRD TRIMESTER: ICD-10-CM

## 2021-09-14 DIAGNOSIS — F32.A ANXIETY AND DEPRESSION: Primary | ICD-10-CM

## 2021-09-14 DIAGNOSIS — Z34.93 PRENATAL CARE IN THIRD TRIMESTER: ICD-10-CM

## 2021-09-14 DIAGNOSIS — A60.00 HERPES SIMPLEX INFECTION OF GENITOURINARY SYSTEM: ICD-10-CM

## 2021-09-14 DIAGNOSIS — Z80.49 FAMILY HISTORY OF CANCER OF FEMALE GENITAL ORGAN: ICD-10-CM

## 2021-09-14 PROCEDURE — 76818 FETAL BIOPHYS PROFILE W/NST: CPT | Performed by: OBSTETRICS & GYNECOLOGY

## 2021-09-14 PROCEDURE — 0502F SUBSEQUENT PRENATAL CARE: CPT | Performed by: OBSTETRICS & GYNECOLOGY

## 2021-09-14 NOTE — ASSESSMENT & PLAN NOTE
Patient with h/o shoulder dystocia in G2 pregnancy, infant weighed 11lb 3.4oz and had shoulder dystocia resulting in clavicle fracture  - discussed delivery options, given LGA infant and GDM this pregnancy, pt would like to proceed with PLTCD for delivery   - scheudled 10/8 at 0800 for PLTCD

## 2021-09-14 NOTE — PROGRESS NOTES
Temp- 97.8F Infrared  Maternal emotional well being screening form completed and reviewed with patient. Current score is 0. Patient given referral to [address] [place] [address] ([phone]):  No

## 2021-09-14 NOTE — ASSESSMENT & PLAN NOTE
Patient with 1hr   - NPH increased to 20 units qAM and 30 units at bedtime  - s/p DM education, continue qid accuchecks  - Plan q4wk growth US,  testing at 32 weeks (2x/week)    - US : 1952g (97%ile)    - US : 7645L (71%ile)

## 2021-09-14 NOTE — PROGRESS NOTES
Return OB Office Visit    CC:   Chief Complaint   Patient presents with    Routine Prenatal Visit       HPI:  Pt seen and examined. No concerns/complaints. Denies VB, LOF, ctx. +FM. BG have been elevated in the morning since last visit. Maternal wellness questionnaire reviewed - no concerns today. Score 0.      Objective:  /66   Pulse 98   Wt 263 lb 12.8 oz (119.7 kg)   LMP 2021   BMI 45.28 kg/m²   Gen: AO, NAD  Abd: Soft, NT  FHT: 135    Assessment/Plan:  27 y.o. [id number] at 34w6d (Estimated Date of Delivery: 10/20/21) presents for NAT appointment:     Problem List Items Addressed This Visit        Gynecology/Obstetric Problems    Herpes genitalia     Will need prophylaxis at 36 weeks          Prenatal care in third trimester     - FWB: reassuring by NST today  - Genetic screening: declined  - Anatomy scan: 21 -- normal anatomy, anterior placenta, nl fluid, CL wnl  - Tdap: plan at 28wks  - PNL: B+/ab-, RI, HIV neg, HepB neg, RPR reactive, Hgb 13.7, UDS neg, VZV non-immune, , GCCT/trich neg, UCx neg   - 1hr , Hgb 12.5, Plt 199          Acquired syphilis       Other    Anxiety and depression - Primary    Insulin controlled gestational diabetes mellitus (GDM) in third trimester     Patient with 1hr   - NPH increased to 20 units qAM and 30 units at bedtime  - s/p DM education, continue qid accuchecks  - Plan q4wk growth US,  testing at 32 weeks (2x/week)    - US : 1952g (97%ile)    - US : 2519g (71%ile)          History of shoulder dystocia in prior pregnancy     Patient with h/o shoulder dystocia in G2 pregnancy, infant weighed 11lb 3.4oz and had shoulder dystocia resulting in clavicle fracture  - discussed delivery options, given LGA infant and GDM this pregnancy, pt would like to proceed with PLTCD for delivery   - scheudled 10/8 at 0800 for PLTCD          Family history of cancer of female genital organ     Desires prophylactic salpingectomy at time of , will discuss with ethics                Dispo: RTC in 3-4 days  Niurka Henning MD

## 2021-09-17 ENCOUNTER — ROUTINE PRENATAL (OUTPATIENT)
Dept: OBGYN CLINIC | Age: 26
End: 2021-09-17
Payer: COMMERCIAL

## 2021-09-17 VITALS
DIASTOLIC BLOOD PRESSURE: 58 MMHG | SYSTOLIC BLOOD PRESSURE: 102 MMHG | WEIGHT: 264 LBS | BODY MASS INDEX: 45.32 KG/M2 | HEART RATE: 90 BPM

## 2021-09-17 DIAGNOSIS — A60.00 HERPES SIMPLEX INFECTION OF GENITOURINARY SYSTEM: ICD-10-CM

## 2021-09-17 DIAGNOSIS — A53.9 ACQUIRED SYPHILIS: ICD-10-CM

## 2021-09-17 DIAGNOSIS — Z87.59 HISTORY OF SHOULDER DYSTOCIA IN PRIOR PREGNANCY: ICD-10-CM

## 2021-09-17 DIAGNOSIS — Z34.93 PRENATAL CARE IN THIRD TRIMESTER: Primary | ICD-10-CM

## 2021-09-17 DIAGNOSIS — F17.200 SMOKING: ICD-10-CM

## 2021-09-17 DIAGNOSIS — Z80.49 FAMILY HISTORY OF CANCER OF FEMALE GENITAL ORGAN: ICD-10-CM

## 2021-09-17 DIAGNOSIS — O24.414 INSULIN CONTROLLED GESTATIONAL DIABETES MELLITUS (GDM) IN THIRD TRIMESTER: ICD-10-CM

## 2021-09-17 PROCEDURE — 0502F SUBSEQUENT PRENATAL CARE: CPT | Performed by: OBSTETRICS & GYNECOLOGY

## 2021-09-17 PROCEDURE — 59025 FETAL NON-STRESS TEST: CPT | Performed by: OBSTETRICS & GYNECOLOGY

## 2021-09-21 ENCOUNTER — OFFICE VISIT (OUTPATIENT)
Dept: OBGYN CLINIC | Age: 26
End: 2021-09-21
Payer: COMMERCIAL

## 2021-09-21 ENCOUNTER — ROUTINE PRENATAL (OUTPATIENT)
Dept: OBGYN CLINIC | Age: 26
End: 2021-09-21

## 2021-09-21 VITALS
BODY MASS INDEX: 45.18 KG/M2 | WEIGHT: 263.2 LBS | HEART RATE: 99 BPM | SYSTOLIC BLOOD PRESSURE: 110 MMHG | DIASTOLIC BLOOD PRESSURE: 64 MMHG

## 2021-09-21 DIAGNOSIS — Z34.93 PRENATAL CARE IN THIRD TRIMESTER: Primary | ICD-10-CM

## 2021-09-21 DIAGNOSIS — A53.9 ACQUIRED SYPHILIS: ICD-10-CM

## 2021-09-21 DIAGNOSIS — Z80.49 FAMILY HISTORY OF CANCER OF FEMALE GENITAL ORGAN: ICD-10-CM

## 2021-09-21 DIAGNOSIS — F17.200 SMOKING: ICD-10-CM

## 2021-09-21 DIAGNOSIS — Z87.59 HISTORY OF SHOULDER DYSTOCIA IN PRIOR PREGNANCY: ICD-10-CM

## 2021-09-21 DIAGNOSIS — O24.414 INSULIN CONTROLLED GESTATIONAL DIABETES MELLITUS (GDM) IN THIRD TRIMESTER: Primary | ICD-10-CM

## 2021-09-21 DIAGNOSIS — A60.00 HERPES SIMPLEX INFECTION OF GENITOURINARY SYSTEM: ICD-10-CM

## 2021-09-21 DIAGNOSIS — O24.414 INSULIN CONTROLLED GESTATIONAL DIABETES MELLITUS (GDM) IN THIRD TRIMESTER: ICD-10-CM

## 2021-09-21 PROCEDURE — 0502F SUBSEQUENT PRENATAL CARE: CPT | Performed by: OBSTETRICS & GYNECOLOGY

## 2021-09-21 PROCEDURE — 76818 FETAL BIOPHYS PROFILE W/NST: CPT | Performed by: OBSTETRICS & GYNECOLOGY

## 2021-09-21 NOTE — PROGRESS NOTES
Temp-98.1F infrared  Maternal emotional well being screening form completed and reviewed with patient. Current score is 0. Patient given referral to [address] [place] [address] ([phone]):  No

## 2021-09-21 NOTE — PROGRESS NOTES
Return OB Office Visit    CC:   Chief Complaint   Patient presents with    Routine Prenatal Visit       HPI:  Pt seen and examined. Denies VB, LOF, ctx. +FM. Has been sore, previous area of hernia was hurting a lot last night but is somewhat better this AM. No nausea or vomiting, no fevers or chills. Eating OK. Maternal wellness questionnaire reviewed - no concerns today. Score 0. Objective:  /64   Pulse 99   Wt 263 lb 3.2 oz (119.4 kg)   LMP 2021   BMI 45.18 kg/m²   Gen: AO, NAD  Abd: Soft, NT  FHT: 135    Assessment/Plan:  27 y.o. [id number] at 35w6d (Estimated Date of Delivery: 10/20/21) presents for NAT appointment:     Problem List Items Addressed This Visit        Gynecology/Obstetric Problems    Herpes genitalia     Will need prophylaxis at 36 weeks, rx sent today          Prenatal care in third trimester - Primary     - FWB: reassuring by NST today  - Genetic screening: declined  - Anatomy scan: 21 -- normal anatomy, anterior placenta, nl fluid, CL wnl  - Tdap: plan at 28wks  - PNL: B+/ab-, RI, HIV neg, HepB neg, RPR reactive, Hgb 13.7, UDS neg, VZV non-immune, , GCCT/trich neg, UCx neg   - 1hr , Hgb 12.5, Plt 199   - GBS sent today          Relevant Orders    Culture, Strep B Screen, Vaginal/Rectal (Completed)    Acquired syphilis     H/o syphilis 2 years, RPR and confirmatory positive with IPV labs. S/p PCN G x3 doses in early pregnancy    - repeat testing with 1:2 titers, will not treat at this time.  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             Other    Smoking    Insulin controlled gestational diabetes mellitus (GDM) in third trimester     Patient with 1hr   - NPH increased to 20 units qAM and 30 units at bedtime  - s/p DM education, continue qid accuchecks  - Plan q4wk growth US,  testing at 32 weeks (2x/week)    - US : 1952g (97%ile)    - US : 2519g (71%ile)          History of shoulder dystocia in prior pregnancy     Patient with h/o shoulder dystocia in G2 pregnancy, infant weighed 11lb 3.4oz and had shoulder dystocia resulting in clavicle fracture  - discussed delivery options, given LGA infant and GDM this pregnancy, pt would like to proceed with PLTCD for delivery   - scheudled 10/8 at 0800 for PLTCD          Family history of cancer of female genital organ     Desires prophylactic salpingectomy at time of , will discuss with ethics                Dispo: RTC in 3-4 days  Yovani Oro MD

## 2021-09-23 RX ORDER — ACYCLOVIR 400 MG/1
400 TABLET ORAL 3 TIMES DAILY
Qty: 60 TABLET | Refills: 1 | Status: ON HOLD | OUTPATIENT
Start: 2021-09-23 | End: 2021-10-10 | Stop reason: HOSPADM

## 2021-09-24 ENCOUNTER — ROUTINE PRENATAL (OUTPATIENT)
Dept: OBGYN CLINIC | Age: 26
End: 2021-09-24
Payer: COMMERCIAL

## 2021-09-24 VITALS
BODY MASS INDEX: 45.18 KG/M2 | DIASTOLIC BLOOD PRESSURE: 60 MMHG | WEIGHT: 263.2 LBS | HEART RATE: 109 BPM | SYSTOLIC BLOOD PRESSURE: 102 MMHG

## 2021-09-24 DIAGNOSIS — Z80.49 FAMILY HISTORY OF CANCER OF FEMALE GENITAL ORGAN: ICD-10-CM

## 2021-09-24 DIAGNOSIS — Z87.59 HISTORY OF SHOULDER DYSTOCIA IN PRIOR PREGNANCY: ICD-10-CM

## 2021-09-24 DIAGNOSIS — F17.200 SMOKING: ICD-10-CM

## 2021-09-24 DIAGNOSIS — A53.9 ACQUIRED SYPHILIS: ICD-10-CM

## 2021-09-24 DIAGNOSIS — O24.414 INSULIN CONTROLLED GESTATIONAL DIABETES MELLITUS (GDM) IN THIRD TRIMESTER: ICD-10-CM

## 2021-09-24 DIAGNOSIS — A60.00 HERPES SIMPLEX INFECTION OF GENITOURINARY SYSTEM: ICD-10-CM

## 2021-09-24 DIAGNOSIS — Z34.93 PRENATAL CARE IN THIRD TRIMESTER: Primary | ICD-10-CM

## 2021-09-24 LAB — GROUP B STREP CULTURE: NORMAL

## 2021-09-24 PROCEDURE — 0502F SUBSEQUENT PRENATAL CARE: CPT | Performed by: OBSTETRICS & GYNECOLOGY

## 2021-09-24 PROCEDURE — 59025 FETAL NON-STRESS TEST: CPT | Performed by: OBSTETRICS & GYNECOLOGY

## 2021-09-24 NOTE — ASSESSMENT & PLAN NOTE
Patient with 1hr   - NPH increased to 20 units qAM and 30 units at bedtime  - s/p DM education, continue qid accuchecks  - Plan q4wk growth US,  testing at 32 weeks (2x/week)    - US : 1952g (97%ile)    - US : 1310J (71%ile)

## 2021-09-24 NOTE — PROGRESS NOTES
Temp-98.3F infrared  Maternal emotional well being screening form completed and reviewed with patient. Current score is 0. Patient given referral to [facility] ([phone]):  No

## 2021-09-24 NOTE — ASSESSMENT & PLAN NOTE
- FWB: reassuring by NST today  - Genetic screening: declined  - Anatomy scan: 6/2/21 -- normal anatomy, anterior placenta, nl fluid, CL wnl  - Tdap: plan at 28wks  - PNL: B+/ab-, RI, HIV neg, HepB neg, RPR reactive, Hgb 13.7, UDS neg, VZV non-immune, , GCCT/trich neg, UCx neg   - 1hr , Hgb 12.5, Plt 199   - GBS sent today

## 2021-09-24 NOTE — PROGRESS NOTES
Return OB Office Visit    CC:   Chief Complaint   Patient presents with    Routine Prenatal Visit       HPI:  Pt seen and examined. No concerns/complaints. Denies VB, LOF, ctx. +FM. Maternal wellness questionnaire reviewed - no concerns today. Score 0. Objective:  /60   Pulse 109   Wt 263 lb 3.2 oz (119.4 kg)   LMP 2021   BMI 45.18 kg/m²   Gen: AO, NAD  Abd: Soft, NT  FHT: 135    Assessment/Plan:  27 y.o. [id number] at 36w2d (Estimated Date of Delivery: 10/20/21) presents for NAT appointment:      Diagnosis Orders   1. Prenatal care in third trimester     2. Smoking     3. Insulin controlled gestational diabetes mellitus (GDM) in third trimester     4. History of shoulder dystocia in prior pregnancy     5. Herpes simplex infection of genitourinary system     6. Family history of cancer of female genital organ     7.  Acquired syphilis       Doing well, routine care  - BG well controlled, continue current regimen  - NST reactive  - GBS neg  - on acyclovir prophylaxis  - PLTCD 10/8/21 at 0800 for h/o shoulder dystocia with  injury, plan for opportunistic salpingectomy at that time as well    Dispo: RTC in 3-4 days  Yovani Oro MD

## 2021-09-28 ENCOUNTER — OFFICE VISIT (OUTPATIENT)
Dept: OBGYN CLINIC | Age: 26
End: 2021-09-28
Payer: COMMERCIAL

## 2021-09-28 ENCOUNTER — ROUTINE PRENATAL (OUTPATIENT)
Dept: OBGYN CLINIC | Age: 26
End: 2021-09-28

## 2021-09-28 VITALS
TEMPERATURE: 97.7 F | HEART RATE: 98 BPM | DIASTOLIC BLOOD PRESSURE: 75 MMHG | WEIGHT: 264.2 LBS | SYSTOLIC BLOOD PRESSURE: 110 MMHG | BODY MASS INDEX: 45.35 KG/M2

## 2021-09-28 DIAGNOSIS — Z87.59 HISTORY OF SHOULDER DYSTOCIA IN PRIOR PREGNANCY: ICD-10-CM

## 2021-09-28 DIAGNOSIS — Z80.49 FAMILY HISTORY OF CANCER OF FEMALE GENITAL ORGAN: ICD-10-CM

## 2021-09-28 DIAGNOSIS — Z34.93 PRENATAL CARE IN THIRD TRIMESTER: Primary | ICD-10-CM

## 2021-09-28 DIAGNOSIS — F17.200 SMOKING: ICD-10-CM

## 2021-09-28 DIAGNOSIS — O24.414 INSULIN CONTROLLED GESTATIONAL DIABETES MELLITUS (GDM) IN THIRD TRIMESTER: Primary | ICD-10-CM

## 2021-09-28 DIAGNOSIS — A53.9 ACQUIRED SYPHILIS: ICD-10-CM

## 2021-09-28 DIAGNOSIS — O24.414 INSULIN CONTROLLED GESTATIONAL DIABETES MELLITUS (GDM) IN THIRD TRIMESTER: ICD-10-CM

## 2021-09-28 PROCEDURE — 76818 FETAL BIOPHYS PROFILE W/NST: CPT | Performed by: OBSTETRICS & GYNECOLOGY

## 2021-09-28 PROCEDURE — 0502F SUBSEQUENT PRENATAL CARE: CPT | Performed by: OBSTETRICS & GYNECOLOGY

## 2021-10-01 ENCOUNTER — ROUTINE PRENATAL (OUTPATIENT)
Dept: OBGYN CLINIC | Age: 26
End: 2021-10-01
Payer: COMMERCIAL

## 2021-10-01 VITALS
DIASTOLIC BLOOD PRESSURE: 70 MMHG | TEMPERATURE: 97.9 F | SYSTOLIC BLOOD PRESSURE: 124 MMHG | HEART RATE: 103 BPM | BODY MASS INDEX: 45.38 KG/M2 | WEIGHT: 264.4 LBS

## 2021-10-01 DIAGNOSIS — Z80.49 FAMILY HISTORY OF CANCER OF FEMALE GENITAL ORGAN: ICD-10-CM

## 2021-10-01 DIAGNOSIS — Z34.93 PRENATAL CARE IN THIRD TRIMESTER: Primary | ICD-10-CM

## 2021-10-01 DIAGNOSIS — Z87.59 HISTORY OF SHOULDER DYSTOCIA IN PRIOR PREGNANCY: ICD-10-CM

## 2021-10-01 DIAGNOSIS — A60.00 HERPES SIMPLEX INFECTION OF GENITOURINARY SYSTEM: ICD-10-CM

## 2021-10-01 DIAGNOSIS — A53.9 ACQUIRED SYPHILIS: ICD-10-CM

## 2021-10-01 DIAGNOSIS — O24.414 INSULIN CONTROLLED GESTATIONAL DIABETES MELLITUS (GDM) IN THIRD TRIMESTER: ICD-10-CM

## 2021-10-01 DIAGNOSIS — F17.200 SMOKING: ICD-10-CM

## 2021-10-01 PROCEDURE — 59025 FETAL NON-STRESS TEST: CPT | Performed by: OBSTETRICS & GYNECOLOGY

## 2021-10-01 PROCEDURE — 0502F SUBSEQUENT PRENATAL CARE: CPT | Performed by: OBSTETRICS & GYNECOLOGY

## 2021-10-01 NOTE — PROGRESS NOTES
Return OB Office Visit    CC:   Chief Complaint   Patient presents with    Routine Prenatal Visit       HPI:  Pt seen and examined. No concerns/complaints. Denies VB, LOF, ctx. +FM. BG:    - Only elevation was 145 after lunch, otherwise all wnl    Maternal wellness questionnaire reviewed - no concerns today. Score 0. Objective:  /70 (Position: Sitting)   Pulse 103   Temp 97.9 °F (36.6 °C)   Wt 264 lb 6.4 oz (119.9 kg)   LMP 2021   BMI 45.38 kg/m²   Gen: AO, NAD  Abd: Soft, NT  FHT: 135    Assessment/Plan:  27 y.o. [id number] at 37w2d (Estimated Date of Delivery: 10/20/21) presents for NAT appointment:      Diagnosis Orders   1. Prenatal care in third trimester     2. Smoking     3. Insulin controlled gestational diabetes mellitus (GDM) in third trimester  11014 - [place] FETAL NON-STRESS TEST   4. History of shoulder dystocia in prior pregnancy     5. Herpes simplex infection of genitourinary system     6. Family history of cancer of female genital organ     7.  Acquired syphilis       Doing well, routine care  - BG well controlled, continue current regimen  - NST reactive, BRUNO normal  - GBS neg  - on acyclovir prophylaxis  - PLTCD 10/8/21 at 0800 for h/o shoulder dystocia with  injury, plan for opportunistic salpingectomy at that time as well (approved by ethics, will place consult on day of surgery)    Dispo: RTC in 3-4 days, NST/BRUNO  Olayinka Turner MD

## 2021-10-05 ENCOUNTER — OFFICE VISIT (OUTPATIENT)
Dept: OBGYN CLINIC | Age: 26
End: 2021-10-05
Payer: COMMERCIAL

## 2021-10-05 ENCOUNTER — ROUTINE PRENATAL (OUTPATIENT)
Dept: OBGYN CLINIC | Age: 26
End: 2021-10-05

## 2021-10-05 VITALS
HEART RATE: 106 BPM | SYSTOLIC BLOOD PRESSURE: 110 MMHG | WEIGHT: 265.4 LBS | BODY MASS INDEX: 45.56 KG/M2 | DIASTOLIC BLOOD PRESSURE: 70 MMHG

## 2021-10-05 DIAGNOSIS — F17.200 SMOKING: ICD-10-CM

## 2021-10-05 DIAGNOSIS — A53.9 ACQUIRED SYPHILIS: ICD-10-CM

## 2021-10-05 DIAGNOSIS — A60.00 HERPES SIMPLEX INFECTION OF GENITOURINARY SYSTEM: ICD-10-CM

## 2021-10-05 DIAGNOSIS — Z87.59 HISTORY OF SHOULDER DYSTOCIA IN PRIOR PREGNANCY: ICD-10-CM

## 2021-10-05 DIAGNOSIS — O24.414 INSULIN CONTROLLED GESTATIONAL DIABETES MELLITUS (GDM) IN THIRD TRIMESTER: Primary | ICD-10-CM

## 2021-10-05 DIAGNOSIS — O24.414 INSULIN CONTROLLED GESTATIONAL DIABETES MELLITUS (GDM) IN THIRD TRIMESTER: ICD-10-CM

## 2021-10-05 DIAGNOSIS — Z34.93 PRENATAL CARE IN THIRD TRIMESTER: Primary | ICD-10-CM

## 2021-10-05 DIAGNOSIS — F41.9 ANXIETY AND DEPRESSION: ICD-10-CM

## 2021-10-05 DIAGNOSIS — Z80.49 FAMILY HISTORY OF CANCER OF FEMALE GENITAL ORGAN: ICD-10-CM

## 2021-10-05 DIAGNOSIS — F32.A ANXIETY AND DEPRESSION: ICD-10-CM

## 2021-10-05 PROCEDURE — 0502F SUBSEQUENT PRENATAL CARE: CPT | Performed by: OBSTETRICS & GYNECOLOGY

## 2021-10-05 PROCEDURE — 76818 FETAL BIOPHYS PROFILE W/NST: CPT | Performed by: OBSTETRICS & GYNECOLOGY

## 2021-10-05 NOTE — PROGRESS NOTES
Return OB Office Visit    CC:   Chief Complaint   Patient presents with    Routine Prenatal Visit       HPI:  Pt seen and examined. No concerns/complaints. Denies VB, LOF, ctx. +FM. Some occasional sharp pains as well but nothing regular. BG logs reviewed, see media. Will continue with current insulin regimen until delivery Friday. Maternal wellness questionnaire reviewed - no concerns today. Objective:  /70   Pulse 106   Wt 265 lb 6.4 oz (120.4 kg)   LMP 2021   BMI 45.56 kg/m²   Gen: AO, NAD  Abd: Soft, NT  FHT: 132    Assessment/Plan:  27 y.o. [id number] at 37w6d (Estimated Date of Delivery: 10/20/21) presents for NAT appointment:      Diagnosis Orders   1. Prenatal care in third trimester     2. Smoking     3. Insulin controlled gestational diabetes mellitus (GDM) in third trimester     4. History of shoulder dystocia in prior pregnancy     5. Family history of cancer of female genital organ     6. Acquired syphilis     7. Herpes simplex infection of genitourinary system     8.  Anxiety and depression       Doing well today, routine care   - FWB reassuring by NST/BRUNO   - continue current insulin, reviewed periop insulin recs   - GBS neg   - acyclovir prophylaxis   - PLTCD 10/8/21 at 0800 for h/o shoulder dystocia with  injury, plan for opportunistic salpingectomy at that time as well (approved by ethics, will place consult on day of surgery)    Dispo: RTC for PP visit, CD 10/8/21  Olayinka Turner MD

## 2021-10-08 ENCOUNTER — ANESTHESIA EVENT (OUTPATIENT)
Dept: LABOR AND DELIVERY | Age: 26
End: 2021-10-08
Payer: COMMERCIAL

## 2021-10-08 ENCOUNTER — ANESTHESIA (OUTPATIENT)
Dept: LABOR AND DELIVERY | Age: 26
End: 2021-10-08
Payer: COMMERCIAL

## 2021-10-08 ENCOUNTER — HOSPITAL ENCOUNTER (INPATIENT)
Age: 26
LOS: 2 days | Discharge: HOME | End: 2021-10-10
Attending: OBSTETRICS & GYNECOLOGY | Admitting: OBSTETRICS & GYNECOLOGY
Payer: COMMERCIAL

## 2021-10-08 VITALS
OXYGEN SATURATION: 100 % | RESPIRATION RATE: 1 BRPM | DIASTOLIC BLOOD PRESSURE: 70 MMHG | SYSTOLIC BLOOD PRESSURE: 137 MMHG

## 2021-10-08 DIAGNOSIS — Z98.891 S/P PRIMARY LOW TRANSVERSE C-SECTION: Primary | ICD-10-CM

## 2021-10-08 LAB
ABO/RH: NORMAL
AMPHETAMINE SCREEN, URINE: NORMAL
ANTIBODY SCREEN: NORMAL
BARBITURATE SCREEN URINE: NORMAL
BENZODIAZEPINE SCREEN, URINE: NORMAL
CANNABINOID SCREEN URINE: NORMAL
COCAINE METABOLITE SCREEN URINE: NORMAL
GLUCOSE BLD-MCNC: 112 MG/DL (ref 70–99)
HCT VFR BLD CALC: 37.2 % (ref 36–48)
HEMOGLOBIN: 12.8 G/DL (ref 12–16)
Lab: NORMAL
MCH RBC QN AUTO: 27.6 PG (ref 26–34)
MCHC RBC AUTO-ENTMCNC: 34.3 G/DL (ref 31–36)
MCV RBC AUTO: 80.3 FL (ref 80–100)
METHADONE SCREEN, URINE: NORMAL
OPIATE SCREEN URINE: NORMAL
OXYCODONE URINE: NORMAL
PDW BLD-RTO: 14.7 % (ref 12.4–15.4)
PERFORMED ON: ABNORMAL
PH UA: 5
PHENCYCLIDINE SCREEN URINE: NORMAL
PLATELET # BLD: 238 K/UL (ref 135–450)
PMV BLD AUTO: 7.1 FL (ref 5–10.5)
PROPOXYPHENE SCREEN: NORMAL
RBC # BLD: 4.63 M/UL (ref 4–5.2)
WBC # BLD: 10.7 K/UL (ref 4–11)

## 2021-10-08 PROCEDURE — 0064U ANTB TP TOTAL&RPR IA QUAL: CPT

## 2021-10-08 PROCEDURE — 59510 CESAREAN DELIVERY: CPT | Performed by: OBSTETRICS & GYNECOLOGY

## 2021-10-08 PROCEDURE — 85027 COMPLETE CBC AUTOMATED: CPT

## 2021-10-08 PROCEDURE — 58611 LIGATE OVIDUCT(S) ADD-ON: CPT | Performed by: OBSTETRICS & GYNECOLOGY

## 2021-10-08 PROCEDURE — 86850 RBC ANTIBODY SCREEN: CPT

## 2021-10-08 PROCEDURE — 7100000001 HC PACU RECOVERY - ADDTL 15 MIN: Performed by: OBSTETRICS & GYNECOLOGY

## 2021-10-08 PROCEDURE — 2500000003 HC RX 250 WO HCPCS: Performed by: NURSE ANESTHETIST, CERTIFIED REGISTERED

## 2021-10-08 PROCEDURE — 1220000000 HC SEMI PRIVATE OB R&B

## 2021-10-08 PROCEDURE — 88302 TISSUE EXAM BY PATHOLOGIST: CPT

## 2021-10-08 PROCEDURE — 3700000000 HC ANESTHESIA ATTENDED CARE: Performed by: OBSTETRICS & GYNECOLOGY

## 2021-10-08 PROCEDURE — 6360000002 HC RX W HCPCS: Performed by: OBSTETRICS & GYNECOLOGY

## 2021-10-08 PROCEDURE — 7100000000 HC PACU RECOVERY - FIRST 15 MIN: Performed by: OBSTETRICS & GYNECOLOGY

## 2021-10-08 PROCEDURE — 86900 BLOOD TYPING SEROLOGIC ABO: CPT

## 2021-10-08 PROCEDURE — 0UT70ZZ RESECTION OF BILATERAL FALLOPIAN TUBES, OPEN APPROACH: ICD-10-PCS | Performed by: OBSTETRICS & GYNECOLOGY

## 2021-10-08 PROCEDURE — 6370000000 HC RX 637 (ALT 250 FOR IP): Performed by: OBSTETRICS & GYNECOLOGY

## 2021-10-08 PROCEDURE — 2709999900 HC NON-CHARGEABLE SUPPLY: Performed by: OBSTETRICS & GYNECOLOGY

## 2021-10-08 PROCEDURE — 86901 BLOOD TYPING SEROLOGIC RH(D): CPT

## 2021-10-08 PROCEDURE — 3700000001 HC ADD 15 MINUTES (ANESTHESIA): Performed by: OBSTETRICS & GYNECOLOGY

## 2021-10-08 PROCEDURE — 80307 DRUG TEST PRSMV CHEM ANLYZR: CPT

## 2021-10-08 PROCEDURE — 2580000003 HC RX 258: Performed by: OBSTETRICS & GYNECOLOGY

## 2021-10-08 PROCEDURE — 3609079900 HC CESAREAN SECTION: Performed by: OBSTETRICS & GYNECOLOGY

## 2021-10-08 PROCEDURE — 6360000002 HC RX W HCPCS: Performed by: NURSE ANESTHETIST, CERTIFIED REGISTERED

## 2021-10-08 RX ORDER — ACETAMINOPHEN 325 MG/1
650 TABLET ORAL EVERY 4 HOURS PRN
Status: DISCONTINUED | OUTPATIENT
Start: 2021-10-08 | End: 2021-10-10 | Stop reason: HOSPADM

## 2021-10-08 RX ORDER — MIDAZOLAM HYDROCHLORIDE 1 MG/ML
INJECTION INTRAMUSCULAR; INTRAVENOUS PRN
Status: DISCONTINUED | OUTPATIENT
Start: 2021-10-08 | End: 2021-10-08 | Stop reason: SDUPTHER

## 2021-10-08 RX ORDER — SODIUM CHLORIDE 0.9 % (FLUSH) 0.9 %
10 SYRINGE (ML) INJECTION EVERY 12 HOURS SCHEDULED
Status: DISCONTINUED | OUTPATIENT
Start: 2021-10-08 | End: 2021-10-08

## 2021-10-08 RX ORDER — OXYCODONE HYDROCHLORIDE AND ACETAMINOPHEN 5; 325 MG/1; MG/1
2 TABLET ORAL EVERY 4 HOURS PRN
Status: DISCONTINUED | OUTPATIENT
Start: 2021-10-08 | End: 2021-10-10 | Stop reason: HOSPADM

## 2021-10-08 RX ORDER — PRENATAL WITH FERROUS FUM AND FOLIC ACID 3080; 920; 120; 400; 22; 1.84; 3; 20; 10; 1; 12; 200; 27; 25; 2 [IU]/1; [IU]/1; MG/1; [IU]/1; MG/1; MG/1; MG/1; MG/1; MG/1; MG/1; UG/1; MG/1; MG/1; MG/1; MG/1
1 TABLET ORAL DAILY
Status: DISCONTINUED | OUTPATIENT
Start: 2021-10-08 | End: 2021-10-10 | Stop reason: HOSPADM

## 2021-10-08 RX ORDER — FENTANYL CITRATE 50 UG/ML
INJECTION, SOLUTION INTRAMUSCULAR; INTRAVENOUS PRN
Status: DISCONTINUED | OUTPATIENT
Start: 2021-10-08 | End: 2021-10-08 | Stop reason: SDUPTHER

## 2021-10-08 RX ORDER — SODIUM CHLORIDE, SODIUM LACTATE, POTASSIUM CHLORIDE, CALCIUM CHLORIDE 600; 310; 30; 20 MG/100ML; MG/100ML; MG/100ML; MG/100ML
INJECTION, SOLUTION INTRAVENOUS CONTINUOUS
Status: DISCONTINUED | OUTPATIENT
Start: 2021-10-08 | End: 2021-10-08

## 2021-10-08 RX ORDER — SODIUM CHLORIDE 9 MG/ML
25 INJECTION, SOLUTION INTRAVENOUS PRN
Status: DISCONTINUED | OUTPATIENT
Start: 2021-10-08 | End: 2021-10-08

## 2021-10-08 RX ORDER — ONDANSETRON 2 MG/ML
INJECTION INTRAMUSCULAR; INTRAVENOUS PRN
Status: DISCONTINUED | OUTPATIENT
Start: 2021-10-08 | End: 2021-10-08 | Stop reason: SDUPTHER

## 2021-10-08 RX ORDER — LANOLIN 100 %
OINTMENT (GRAM) TOPICAL
Status: DISCONTINUED | OUTPATIENT
Start: 2021-10-08 | End: 2021-10-10 | Stop reason: HOSPADM

## 2021-10-08 RX ORDER — OXYCODONE HYDROCHLORIDE 5 MG/1
10 TABLET ORAL EVERY 4 HOURS PRN
Status: DISCONTINUED | OUTPATIENT
Start: 2021-10-08 | End: 2021-10-10 | Stop reason: HOSPADM

## 2021-10-08 RX ORDER — OXYTOCIN 10 [USP'U]/ML
INJECTION, SOLUTION INTRAMUSCULAR; INTRAVENOUS PRN
Status: DISCONTINUED | OUTPATIENT
Start: 2021-10-08 | End: 2021-10-08 | Stop reason: SDUPTHER

## 2021-10-08 RX ORDER — OXYCODONE HYDROCHLORIDE 5 MG/1
5 TABLET ORAL EVERY 4 HOURS PRN
Status: DISCONTINUED | OUTPATIENT
Start: 2021-10-08 | End: 2021-10-10 | Stop reason: HOSPADM

## 2021-10-08 RX ORDER — OXYCODONE HYDROCHLORIDE AND ACETAMINOPHEN 5; 325 MG/1; MG/1
1 TABLET ORAL EVERY 4 HOURS PRN
Status: DISCONTINUED | OUTPATIENT
Start: 2021-10-08 | End: 2021-10-10 | Stop reason: HOSPADM

## 2021-10-08 RX ORDER — ONDANSETRON 4 MG/1
4 TABLET, ORALLY DISINTEGRATING ORAL EVERY 8 HOURS PRN
Status: DISCONTINUED | OUTPATIENT
Start: 2021-10-08 | End: 2021-10-10 | Stop reason: HOSPADM

## 2021-10-08 RX ORDER — SODIUM CHLORIDE 9 MG/ML
25 INJECTION, SOLUTION INTRAVENOUS PRN
Status: DISCONTINUED | OUTPATIENT
Start: 2021-10-08 | End: 2021-10-10 | Stop reason: HOSPADM

## 2021-10-08 RX ORDER — FERROUS SULFATE 325(65) MG
325 TABLET ORAL
Status: DISCONTINUED | OUTPATIENT
Start: 2021-10-09 | End: 2021-10-10 | Stop reason: HOSPADM

## 2021-10-08 RX ORDER — SODIUM CHLORIDE 0.9 % (FLUSH) 0.9 %
10 SYRINGE (ML) INJECTION EVERY 12 HOURS SCHEDULED
Status: DISCONTINUED | OUTPATIENT
Start: 2021-10-08 | End: 2021-10-10 | Stop reason: HOSPADM

## 2021-10-08 RX ORDER — ACETAMINOPHEN 500 MG
1000 TABLET ORAL EVERY 8 HOURS SCHEDULED
Status: DISCONTINUED | OUTPATIENT
Start: 2021-10-08 | End: 2021-10-10 | Stop reason: HOSPADM

## 2021-10-08 RX ORDER — DOCUSATE SODIUM 100 MG/1
100 CAPSULE, LIQUID FILLED ORAL 2 TIMES DAILY
Status: DISCONTINUED | OUTPATIENT
Start: 2021-10-08 | End: 2021-10-10 | Stop reason: HOSPADM

## 2021-10-08 RX ORDER — EPHEDRINE SULFATE 50 MG/ML
INJECTION INTRAVENOUS PRN
Status: DISCONTINUED | OUTPATIENT
Start: 2021-10-08 | End: 2021-10-08 | Stop reason: SDUPTHER

## 2021-10-08 RX ORDER — MORPHINE SULFATE 1 MG/ML
INJECTION, SOLUTION EPIDURAL; INTRATHECAL; INTRAVENOUS PRN
Status: DISCONTINUED | OUTPATIENT
Start: 2021-10-08 | End: 2021-10-08 | Stop reason: SDUPTHER

## 2021-10-08 RX ORDER — KETOROLAC TROMETHAMINE 30 MG/ML
30 INJECTION, SOLUTION INTRAMUSCULAR; INTRAVENOUS EVERY 8 HOURS
Status: DISCONTINUED | OUTPATIENT
Start: 2021-10-08 | End: 2021-10-10 | Stop reason: HOSPADM

## 2021-10-08 RX ORDER — SODIUM CHLORIDE 0.9 % (FLUSH) 0.9 %
10 SYRINGE (ML) INJECTION PRN
Status: DISCONTINUED | OUTPATIENT
Start: 2021-10-08 | End: 2021-10-08

## 2021-10-08 RX ORDER — SODIUM CHLORIDE 0.9 % (FLUSH) 0.9 %
10 SYRINGE (ML) INJECTION PRN
Status: DISCONTINUED | OUTPATIENT
Start: 2021-10-08 | End: 2021-10-10 | Stop reason: HOSPADM

## 2021-10-08 RX ORDER — SODIUM CHLORIDE, SODIUM LACTATE, POTASSIUM CHLORIDE, CALCIUM CHLORIDE 600; 310; 30; 20 MG/100ML; MG/100ML; MG/100ML; MG/100ML
INJECTION, SOLUTION INTRAVENOUS CONTINUOUS
Status: DISCONTINUED | OUTPATIENT
Start: 2021-10-08 | End: 2021-10-10 | Stop reason: HOSPADM

## 2021-10-08 RX ORDER — IBUPROFEN 800 MG/1
800 TABLET ORAL EVERY 8 HOURS PRN
Status: DISCONTINUED | OUTPATIENT
Start: 2021-10-08 | End: 2021-10-09 | Stop reason: SDUPTHER

## 2021-10-08 RX ORDER — ONDANSETRON 2 MG/ML
4 INJECTION INTRAMUSCULAR; INTRAVENOUS EVERY 6 HOURS PRN
Status: DISCONTINUED | OUTPATIENT
Start: 2021-10-08 | End: 2021-10-08

## 2021-10-08 RX ORDER — BUPIVACAINE HYDROCHLORIDE 7.5 MG/ML
INJECTION, SOLUTION INTRASPINAL PRN
Status: DISCONTINUED | OUTPATIENT
Start: 2021-10-08 | End: 2021-10-08 | Stop reason: SDUPTHER

## 2021-10-08 RX ORDER — KETAMINE HYDROCHLORIDE 100 MG/ML
INJECTION, SOLUTION INTRAMUSCULAR; INTRAVENOUS PRN
Status: DISCONTINUED | OUTPATIENT
Start: 2021-10-08 | End: 2021-10-08 | Stop reason: SDUPTHER

## 2021-10-08 RX ORDER — SODIUM CHLORIDE, SODIUM LACTATE, POTASSIUM CHLORIDE, AND CALCIUM CHLORIDE .6; .31; .03; .02 G/100ML; G/100ML; G/100ML; G/100ML
1000 INJECTION, SOLUTION INTRAVENOUS ONCE
Status: DISCONTINUED | OUTPATIENT
Start: 2021-10-08 | End: 2021-10-08

## 2021-10-08 RX ORDER — SIMETHICONE 80 MG
80 TABLET,CHEWABLE ORAL EVERY 6 HOURS PRN
Status: DISCONTINUED | OUTPATIENT
Start: 2021-10-08 | End: 2021-10-10 | Stop reason: HOSPADM

## 2021-10-08 RX ORDER — IBUPROFEN 800 MG/1
800 TABLET ORAL EVERY 8 HOURS SCHEDULED
Status: DISCONTINUED | OUTPATIENT
Start: 2021-10-08 | End: 2021-10-10 | Stop reason: HOSPADM

## 2021-10-08 RX ADMIN — MIDAZOLAM 2 MG: 1 INJECTION INTRAMUSCULAR; INTRAVENOUS at 09:00

## 2021-10-08 RX ADMIN — Medication 3000 MG: at 08:20

## 2021-10-08 RX ADMIN — FENTANYL CITRATE 35 MCG: 50 INJECTION INTRAMUSCULAR; INTRAVENOUS at 08:44

## 2021-10-08 RX ADMIN — FENTANYL CITRATE 15 MCG: 50 INJECTION INTRAMUSCULAR; INTRAVENOUS at 08:17

## 2021-10-08 RX ADMIN — FENTANYL CITRATE 50 MCG: 50 INJECTION INTRAMUSCULAR; INTRAVENOUS at 08:55

## 2021-10-08 RX ADMIN — OXYTOCIN 10 UNITS: 10 INJECTION INTRAVENOUS at 08:58

## 2021-10-08 RX ADMIN — BUPIVACAINE HYDROCHLORIDE 1.7 ML: 7.5 INJECTION, SOLUTION SUBARACHNOID at 08:17

## 2021-10-08 RX ADMIN — SODIUM CHLORIDE, POTASSIUM CHLORIDE, SODIUM LACTATE AND CALCIUM CHLORIDE: 600; 310; 30; 20 INJECTION, SOLUTION INTRAVENOUS at 08:58

## 2021-10-08 RX ADMIN — FENTANYL CITRATE 50 MCG: 50 INJECTION INTRAMUSCULAR; INTRAVENOUS at 08:43

## 2021-10-08 RX ADMIN — MORPHINE SULFATE 0.2 MG: 1 INJECTION EPIDURAL; INTRATHECAL; INTRAVENOUS at 08:17

## 2021-10-08 RX ADMIN — DOCUSATE SODIUM 100 MG: 100 CAPSULE ORAL at 21:12

## 2021-10-08 RX ADMIN — SODIUM CHLORIDE, POTASSIUM CHLORIDE, SODIUM LACTATE AND CALCIUM CHLORIDE: 600; 310; 30; 20 INJECTION, SOLUTION INTRAVENOUS at 06:20

## 2021-10-08 RX ADMIN — KETOROLAC TROMETHAMINE 30 MG: 30 INJECTION, SOLUTION INTRAMUSCULAR at 13:18

## 2021-10-08 RX ADMIN — MIDAZOLAM 2 MG: 1 INJECTION INTRAMUSCULAR; INTRAVENOUS at 08:51

## 2021-10-08 RX ADMIN — OXYTOCIN 20 UNITS: 10 INJECTION INTRAVENOUS at 08:42

## 2021-10-08 RX ADMIN — EPHEDRINE SULFATE 10 MG: 50 INJECTION INTRAVENOUS at 08:21

## 2021-10-08 RX ADMIN — SODIUM CHLORIDE, POTASSIUM CHLORIDE, SODIUM LACTATE AND CALCIUM CHLORIDE: 600; 310; 30; 20 INJECTION, SOLUTION INTRAVENOUS at 13:02

## 2021-10-08 RX ADMIN — KETAMINE HYDROCHLORIDE 10 MG: 100 INJECTION INTRAMUSCULAR; INTRAVENOUS at 09:14

## 2021-10-08 RX ADMIN — ONDANSETRON 4 MG: 2 INJECTION INTRAMUSCULAR; INTRAVENOUS at 08:13

## 2021-10-08 RX ADMIN — KETAMINE HYDROCHLORIDE 20 MG: 100 INJECTION INTRAMUSCULAR; INTRAVENOUS at 09:00

## 2021-10-08 RX ADMIN — KETAMINE HYDROCHLORIDE 10 MG: 100 INJECTION INTRAMUSCULAR; INTRAVENOUS at 09:17

## 2021-10-08 RX ADMIN — IBUPROFEN 800 MG: 800 TABLET, FILM COATED ORAL at 21:12

## 2021-10-08 RX ADMIN — ONDANSETRON 4 MG: 4 TABLET, ORALLY DISINTEGRATING ORAL at 15:12

## 2021-10-08 RX ADMIN — SODIUM CHLORIDE, POTASSIUM CHLORIDE, SODIUM LACTATE AND CALCIUM CHLORIDE: 600; 310; 30; 20 INJECTION, SOLUTION INTRAVENOUS at 06:47

## 2021-10-08 ASSESSMENT — PULMONARY FUNCTION TESTS
PIF_VALUE: 0
PIF_VALUE: 0
PIF_VALUE: 3
PIF_VALUE: 0
PIF_VALUE: 1
PIF_VALUE: 0
PIF_VALUE: -14
PIF_VALUE: 0
PIF_VALUE: 1
PIF_VALUE: 0
PIF_VALUE: 0
PIF_VALUE: 1
PIF_VALUE: 0

## 2021-10-08 ASSESSMENT — PAIN SCALES - GENERAL
PAINLEVEL_OUTOF10: 5
PAINLEVEL_OUTOF10: 2

## 2021-10-08 NOTE — CONSULTS
Clinical Ethics Consultation Note    Called by Dr. Alex Marr 21 for Ms. Daisy Castellanos, a 28 yo female who has requested a bilateral salpingectomy to reduce the risk of cancer. Ms. Daisy Castellanos will have a repeat  10/8. She has increased risk for breast, ovarian, and/or uterine cancer due to family history of ovarian and endometrial cancers. Ms. Daisy Castellanos has been counseled regarding irreversible infertility. This procedure is consistent with the ERDs, as there is a strong likelihood of existing pathology in her fallopian tubes, and the procedure is morally justified. For questions or concerns regarding this consultation, please call me directly.     Delgado Vera, PhD, [facility]  Ethics  [phone] none

## 2021-10-08 NOTE — H&P
Obstetrics Admission History and Physical    CC:   Chief Complaint   Patient presents with    Scheduled        HPI: Vilma Barry is a 27 y.o. [id number] at 36w4d who presents for scheduled  delivery. Doing well today, no complaints. +FM, no VB/LOF. Pregnancy complicated by [id number], smoking, h/o shoulder dystocia with fetal injury, family history of ovarian and endometrial cancer, syphiils, herpes and anxiety. Review of Systems: The following ROS was otherwise negative, except as noted in the HPI: constitutional, HEENT, respiratory, cardiovascular, gastrointestinal, genitourinary, skin, musculoskeletal, neurological, psych. OBGYN Provider : Yadiel Rubio    Obstetrical History:  OB History    Para Term  AB Living   4 2 2 0 1 2   SAB TAB Ectopic Molar Multiple Live Births   1 0 0 0 0 2      # Outcome Date GA Lbr Stephon/2nd Weight Sex Delivery Anes PTL Lv   4 Current            3 SAB 2020           2 Term 10/26/19 39w4d  11 lb 3.4 oz (5.086 kg) F Vag-Spont EPI N TIERNEY      Complications: Shoulder Dystocia      Name: Loretta Whitaker Term 03/17/15 40w0d  8 lb 11.6 oz (3.958 kg) F Vag-Spont EPI N TIERNEY      Name: Jose Funk       Past Medical History:   Past Medical History:   Diagnosis Date    Depression     history of [id number]    Fx one rib-open     LEFT SIDE    Herpes simplex virus (HSV) infection     Insulin controlled gestational diabetes mellitus (GDM) in third trimester 2021    PTSD (post-traumatic stress disorder)        Medications:  No current facility-administered medications on file prior to encounter. Current Outpatient Medications on File Prior to Encounter   Medication Sig Dispense Refill    insulin NPH (HUMULIN N) 100 UNIT/ML injection vial Inject into the skin 2 times daily. 20 units before breakfast and 26 units at bedtime.  1 vial 3    INSULIN SYRINGE .5CC/29G 29G X 1/2\" 0.5 ML MISC 1 each by Does not apply route daily 100 each 1    Prenatal Vit-Fe Fumarate-FA (PRENATAL 1+1 PO) Take by mouth daily      pyridoxine (B-6) 25 MG tablet Take 1 tablet by mouth 3 times daily as needed (nausea) 30 tablet 1       Allergies:  Sulfa antibiotics    Surgical History:  Past Surgical History:   Procedure Laterality Date    MOUTH SURGERY      CYST    TONSILLECTOMY         Family History:  Family History   Problem Relation Age of Onset    Cancer Mother 39        uterine ca    Migraines Mother     High Blood Pressure Father     High Cholesterol Father    Looney Migraines Sister     Diabetes Maternal Grandmother     Celiac Disease Maternal Grandmother     Cancer Maternal Grandfather         bladder ca with mets       Social History:  Social History     Substance and Sexual Activity   Alcohol Use Not Currently     Social History     Substance and Sexual Activity   Drug Use Never     Social History     Tobacco Use   Smoking Status Current Every Day Smoker    Packs/day: 0.50    Years: 6.00    Pack years: 3.00    Last attempt to quit: 1/22/2018    Years since quitting: 3.7   Smokeless Tobacco Never Used   Tobacco Comment    down 4-5 cig a day       Physical Exam:  BP (!) 114/58   Pulse 93   Temp 98.2 °F (36.8 °C)   Resp 16   Ht 5' 4\" (1.626 m)   Wt 265 lb (120.2 kg)   LMP 01/13/2021   BMI 45.49 kg/m²   Physical Exam  Constitutional:       Appearance: Normal appearance. HENT:      Head: Normocephalic. Cardiovascular:      Rate and Rhythm: Normal rate and regular rhythm. Pulmonary:      Effort: Pulmonary effort is normal. No respiratory distress. Abdominal:      Palpations: Abdomen is soft. Tenderness: There is no abdominal tenderness. There is no guarding or rebound. Skin:     General: Skin is warm and dry. Neurological:      General: No focal deficit present. Mental Status: She is alert.    Psychiatric:         Mood and Affect: Mood normal.         Cervix: deferred    Fetal Heart Monitor Interpretation:   FHT: 135 bpm, moderate variability, + accels, no decels  [place]: quiet    Labs:  Admission on 10/08/2021   Component Date Value    ABO/Rh 10/08/2021 B POS     Antibody Screen 10/08/2021 NEG     WBC 10/08/2021 10.7     RBC 10/08/2021 4.63     Hemoglobin 10/08/2021 12.8     Hematocrit 10/08/2021 37.2     MCV 10/08/2021 80.3     MCH 10/08/2021 27.6     MCHC 10/08/2021 34.3     RDW 10/08/2021 14.7     Platelets 94/99/8724 238     MPV 10/08/2021 7.1     Amphetamine Screen, Urine 10/08/2021 Neg     Barbiturate Screen, Ur 10/08/2021 Neg     Benzodiazepine Screen, U* 10/08/2021 Neg     Cannabinoid Scrn, Ur 10/08/2021 Neg     Cocaine Metabolite Scree* 10/08/2021 Neg     Opiate Scrn, Ur 10/08/2021 Neg     PCP Screen, Urine 10/08/2021 Neg     Methadone Screen, Urine 10/08/2021 Neg     Propoxyphene Scrn, Ur 10/08/2021 Neg     Oxycodone Urine 10/08/2021 Neg     pH, UA 10/08/2021 5.0     Drug Screen Comment: 10/08/2021 see below     POC Glucose 10/08/2021 112*    Performed on 10/08/2021 ACCU-CHEK        Assessment/Plan:   27 y.o. [id number] at 38w2d here for scheduled PLTCD and risk reducing prophylactic bilateral salpingectomy    1. FWB -reasuring  -cEFM    2. PLTCD + salpingectomy  - pt desires for h/o shoulder dystocia with fetal injury in prior pregnancy  - pre-op labs reviewed, r/b/a of procedure discussed, consents signed  - plan for OR as scheduled  - h/o female cancer, ethics consult placed this AM    3. GDMA2  - insulin, will need GTT postpartum    4. Smoking    5. Herpes  - on acyclovir    6. Syphilis    7.  Anxiety    Dispo: admit for CD and post-op care  Sherwin Oliveira MD

## 2021-10-08 NOTE — ANESTHESIA PRE PROCEDURE
Department of Anesthesiology  Preprocedure Note       Name:  Manuel Ibanez   Age:  27 y.o.  :  1995                                          MRN:  [id number]         Date:  10/8/2021      Surgeon: Carmen Signs):  Sherwin Oliveira MD    Procedure: Procedure(s):   SECTION    Medications prior to admission:   Prior to Admission medications    Medication Sig Start Date End Date Taking? Authorizing Provider   acyclovir (ZOVIRAX) 400 MG tablet Take 1 tablet by mouth 3 times daily 21   Sherwin Oliveira MD   insulin NPH (HUMULIN N) 100 UNIT/ML injection vial Inject into the skin 2 times daily. 20 units before breakfast and 26 units at bedtime.  21   Sherwin Oliveira MD   INSULIN SYRINGE .5CC/29G 29G X 1/2\" 0.5 ML MISC 1 each by Does not apply route daily 21   Sherwin Oliveira MD   Prenatal Vit-Fe Fumarate-FA (PRENATAL 1+1 PO) Take by mouth daily    Historical Provider, MD   pyridoxine (B-6) 25 MG tablet Take 1 tablet by mouth 3 times daily as needed (nausea) 3/30/21   Sherwin Oliveira MD       Current medications:    Current Facility-Administered Medications   Medication Dose Route Frequency Provider Last Rate Last Admin    lactated ringers infusion   IntraVENous Continuous Sherwin Oliveira MD        lactated ringers bolus  1,000 mL IntraVENous Once Sherwin Oliveira MD        sodium chloride flush 0.9 % injection 10 mL  10 mL IntraVENous 2 times per day Sherwin Oliveira MD        sodium chloride flush 0.9 % injection 10 mL  10 mL IntraVENous PRN Sherwin Oliveira MD        0.9 % sodium chloride infusion  25 mL IntraVENous PRN Sherwin Oliveira MD        ondansetron (ZOFRAN) injection 4 mg  4 mg IntraVENous Q6H PRN Sherwin Oliveira MD        ceFAZolin (ANCEF) 3000 mg in dextrose 5 % 100 mL IVPB  3,000 mg IntraVENous Once Sherwin Oliveira MD        oxytocin (PITOCIN) 30 units in 500 mL infusion  87.3 jerad-units/min IntraVENous Continuous PRN Sherwin Oliveira MD        And    oxytocin (PITOCIN) 10 unit bolus from the bag  10 Units IntraVENous PRN Amanda Anton MD           Allergies: Allergies   Allergen Reactions    Sulfa Antibiotics        Problem List:    Patient Active Problem List   Diagnosis Code    Anxiety and depression F41.9, F32. A    Insomnia G47.00    Smoking F17.200    Herpes genitalia A60.00    Prenatal care in third trimester Z34.93    Nausea and vomiting during pregnancy O21.9    Acquired syphilis A53.9    Insulin controlled gestational diabetes mellitus (GDM) in third trimester O22.5    History of shoulder dystocia in prior pregnancy Z87.59    Family history of cancer of female genital organ Z80.53       Past Medical History:        Diagnosis Date    Depression     history of 9660-7337    Fx one rib-open     LEFT SIDE    Herpes simplex virus (HSV) infection     Insulin controlled gestational diabetes mellitus (GDM) in third trimester 7/28/2021    PTSD (post-traumatic stress disorder)        Past Surgical History:        Procedure Laterality Date    MOUTH SURGERY      CYST    TONSILLECTOMY         Social History:    Social History     Tobacco Use    Smoking status: Current Every Day Smoker     Packs/day: 0.50     Years: 6.00     Pack years: 3.00     Last attempt to quit: 1/22/2018     Years since quitting: 3.7    Smokeless tobacco: Never Used    Tobacco comment: down 4-5 cig a day   Substance Use Topics    Alcohol use: Not Currently                                Ready to quit: Not Answered  Counseling given: Not Answered  Comment: down 4-5 cig a day      Vital Signs (Current):   Vitals:    10/08/21 0627   Weight: 265 lb (120.2 kg)   Height: 5' 4\" (1.626 m)                                              BP Readings from Last 3 Encounters:   10/05/21 110/70   10/01/21 124/70   09/28/21 110/75       NPO Status: Time of last liquid consumption: 2306                        Time of last solid consumption: 2306                        Date of last liquid consumption: 10/07/21                        Date Endo/Other: Negative Endo/Other ROS   (+) Diabetesusing insulin, . Abdominal:             Vascular: Other Findings:             Anesthesia Plan      spinal     ASA 2 - emergent     (I discussed with the patient the risks and benefits of PIV, neuraxial anesthesia with narcotics, with general anesthesia backup. All questions were answered the patient agrees with the plan. Recent Vitals: There were no vitals filed for this visit. Body mass index is 45.49 kg/m². Social History    Tobacco Use      Smoking status: Current Every Day Smoker        Packs/day: 0.50        Years: 6.00        Pack years: 3        Quit date: 1/22/2018        Years since quitting: 3.7      Smokeless tobacco: Never Used      Tobacco comment: down 4-5 cig a day      LABS:    CBC  Lab Results       Component                Value               Date/Time                  WBC                      9.1                 07/06/2021 09:42 AM        HGB                      12.5                07/06/2021 09:42 AM        HCT                      37.0                07/06/2021 09:42 AM        PLT                      199                 07/06/2021 09:42 AM   RENAL  Lab Results       Component                Value               Date/Time                  NA                       140                 12/26/2020 12:35 AM        K                        3.8                 12/26/2020 12:35 AM        CL                       105                 12/26/2020 12:35 AM        CO2                      23                  12/26/2020 12:35 AM        BUN                      15                  12/26/2020 12:35 AM        CREATININE               0.7                 12/26/2020 12:35 AM        GLUCOSE                  128 (H)             12/26/2020 12:35 AM   COAGS  No results found for: PROTIME, INR, APTT)        Anesthetic plan and risks discussed with patient.                       RAGHAV Heaton - BENI   10/8/2021

## 2021-10-08 NOTE — ANESTHESIA PROCEDURE NOTES
Spinal Block    Patient location during procedure: OR  Reason for block: primary anesthetic  Staffing  Resident/CRNA: RAGHAV Christensen CRNA  Preanesthetic Checklist  Completed: patient identified, IV checked, risks and benefits discussed, monitors and equipment checked, pre-op evaluation, timeout performed, anesthesia consent given, oxygen available and patient being monitored  Spinal Block  Patient position: sitting  Prep: ChloraPrep  Patient monitoring: continuous pulse ox and frequent blood pressure checks  Approach: midline  Location: L3/L4  Provider prep: sterile gloves and mask  Local infiltration: lidocaine  Dose: 0.2  Agent: bupivacaine  Adjuvant: duramorph  Dose: 1.7  Dose: 1.7  Needle  Needle type: Pencan   Needle gauge: 24 G  Needle length: 4 in  Assessment  Swirl obtained: Yes  CSF: clear  Attempts: 1  Hemodynamics: stable

## 2021-10-08 NOTE — OP NOTE
Department of Obstetrics and Gynecology   Section Note    Pre-operative Diagnosis:   1. Intrauterine pregnancy at 38 week 2 day pregnancy. Post-operative Diagnosis:   1. S/P PLTCD + bilateral prophylactic salpingectomy  2. Living  infant(s) and Male    Procedure: PLTCD and bilateral salpingectomy    Surgeon: Nancy Cardoza MD    Findings: Courtney Weems had normal appearing uterus, tubes and ovaries. Delivered a viable male fetus, apgras 8/9 and weight 3730g. Normal bilateral fallopian tubes removed without difficulty and hemostasis noted at end of procedure. EBL: 700mL    IVF: 1600mL    UOP: per anesthesia    Specimens: bilateral fallopian tubes    Complications:  none     Indication and Consent: The patient is a 27 y.o. [id number] who presented for scheduled PLTCD and salpingectomy. She was counseled on the risks, benefits, and alternatives of the procedure and desired to proceed. All questions were answered and informed consent was obtained prior to proceeding to the operating. Procedure Details   The patient was taken to the Operating Room, identified as Bethany Strongsin and the procedure verified as  Delivery. A Time Out was held and the above information confirmed. After induction of anesthesia, the patient was draped and prepped in the usual sterile manner. A Pfannenstiel skin incision was made and carried down through the subcutaneous tissue to the fascia. Fascial incision was made and extended laterally. The fascia was  from the underlying rectus tissue superiorly and inferiorly both bluntly and sharply. The peritoneum was identified and entered bluntly. Peritoneal incision was extended longitudinally. A low transverse uterine incision was made and extended bluntly. The infant's head was then grasped and brought to the level of the hysterotomy and delivered without difficulty with fundal pressure.  A male infant was delivered weighing 3730 grams with Apgar scores of 8 at one minute and 9 at five minutes. After the umbilical cord was clamped and cut cord blood was obtained for evaluation. The placenta was removed intact with fundal massage and appeared normal. The uterus was then exteriorized and the uterine outline, tubes and ovaries appeared normal. The inside of the uterus was wiped with moist laparotomy sponges. The uterine incision was then closed with running locked sutures of 0 Vicryl and a second layer of 0 Vicryl suture in an imbricating stitch. Hemostasis was observed. At this time salpingectomy was performed on the right. The tube was grasped with ana clamps and avascular windows were made in the underlying mesosalpinx. The mesosalpinx and vasculature were sequentially clamped and tied until the tube was free. Similar procedure was done on the left. Uterus and ovaries were then returned to the abdominal cavity. The gutters were cleaned with moist laparotomy sponges and hemostasis was again noted. Peritoneum was then closed with 2-0 Vicryl and the fascia was then reapproximated with running sutures of 0 Vicryl. Subcutaneous tissue was closed using 3-0 Vicryl in a running fashion and skin was reapproximated with 3-0 Monocryl. The patient tolerated the procedure well and instrument, sponge, and needle counts were correct x2 prior the abdominal closure and at the conclusion of the case. Disposition: PACU - hemodynamically stable. Condition: infant stable to general nursery and mother stable    Attending Attestation: I performed the procedure.     Lita Horn MD

## 2021-10-09 LAB
GLUCOSE BLD-MCNC: 101 MG/DL (ref 70–99)
GLUCOSE BLD-MCNC: 128 MG/DL (ref 70–99)
GLUCOSE BLD-MCNC: 130 MG/DL (ref 70–99)
HCT VFR BLD CALC: 31.3 % (ref 36–48)
HEMOGLOBIN: 10.7 G/DL (ref 12–16)
MCH RBC QN AUTO: 27.9 PG (ref 26–34)
MCHC RBC AUTO-ENTMCNC: 34.1 G/DL (ref 31–36)
MCV RBC AUTO: 81.7 FL (ref 80–100)
PDW BLD-RTO: 14.7 % (ref 12.4–15.4)
PERFORMED ON: ABNORMAL
PLATELET # BLD: 211 K/UL (ref 135–450)
PMV BLD AUTO: 7.2 FL (ref 5–10.5)
RBC # BLD: 3.83 M/UL (ref 4–5.2)
RPR CONFIRMATORY: REACTIVE
RPR TITER: NORMAL
TOTAL SYPHILLIS IGG/IGM: REACTIVE
WBC # BLD: 10.3 K/UL (ref 4–11)

## 2021-10-09 PROCEDURE — 85027 COMPLETE CBC AUTOMATED: CPT

## 2021-10-09 PROCEDURE — 36415 COLL VENOUS BLD VENIPUNCTURE: CPT

## 2021-10-09 PROCEDURE — 6370000000 HC RX 637 (ALT 250 FOR IP): Performed by: OBSTETRICS & GYNECOLOGY

## 2021-10-09 PROCEDURE — 1220000000 HC SEMI PRIVATE OB R&B

## 2021-10-09 RX ADMIN — DOCUSATE SODIUM 100 MG: 100 CAPSULE ORAL at 09:32

## 2021-10-09 RX ADMIN — IBUPROFEN 800 MG: 800 TABLET, FILM COATED ORAL at 05:41

## 2021-10-09 RX ADMIN — ACETAMINOPHEN 1000 MG: 500 TABLET ORAL at 20:37

## 2021-10-09 RX ADMIN — ACETAMINOPHEN 1000 MG: 500 TABLET ORAL at 01:23

## 2021-10-09 RX ADMIN — OXYCODONE 5 MG: 5 TABLET ORAL at 20:36

## 2021-10-09 RX ADMIN — IBUPROFEN 800 MG: 800 TABLET, FILM COATED ORAL at 14:21

## 2021-10-09 RX ADMIN — OXYCODONE 5 MG: 5 TABLET ORAL at 15:23

## 2021-10-09 RX ADMIN — OXYCODONE 5 MG: 5 TABLET ORAL at 11:31

## 2021-10-09 RX ADMIN — DOCUSATE SODIUM 100 MG: 100 CAPSULE ORAL at 19:00

## 2021-10-09 RX ADMIN — ACETAMINOPHEN 1000 MG: 500 TABLET ORAL at 09:31

## 2021-10-09 RX ADMIN — IBUPROFEN 800 MG: 800 TABLET, FILM COATED ORAL at 19:00

## 2021-10-09 RX ADMIN — OXYCODONE 5 MG: 5 TABLET ORAL at 07:33

## 2021-10-09 ASSESSMENT — PAIN SCALES - GENERAL
PAINLEVEL_OUTOF10: 5
PAINLEVEL_OUTOF10: 6
PAINLEVEL_OUTOF10: 5
PAINLEVEL_OUTOF10: 4
PAINLEVEL_OUTOF10: 7
PAINLEVEL_OUTOF10: 5
PAINLEVEL_OUTOF10: 6
PAINLEVEL_OUTOF10: 6

## 2021-10-09 NOTE — ANESTHESIA POSTPROCEDURE EVALUATION
Department of Anesthesiology  Postprocedure Note    Patient: Cass Hale  MRN: [id number]  YOB: 1995  Date of evaluation: 10/9/2021  Time:  9:14 AM     Procedure Summary     Date: 10/08/21 Room / Location: [facility] L&D OR  / [facility]    Anesthesia Start: 5073 Anesthesia Stop: 3967    Procedure:  SECTION (N/A Abdomen) Diagnosis:       (Primary c/s)      (GDMA2-insulin, Hx of shoulder dystocia)      ([phone])    Surgeons: Jeremy Coffey MD Responsible Provider: Hernando Shaw MD    Anesthesia Type: spinal ASA Status: 2 - Emergent          Anesthesia Type: spinal    Pan Phase I: Pan Score: 9    Pan Phase II: Pan Score: 10    Last vitals: Reviewed and per EMR flowsheets.        Anesthesia Post Evaluation    Patient location during evaluation: bedside  Patient participation: complete - patient participated  Level of consciousness: awake and alert  Airway patency: patent  Nausea & Vomiting: no nausea and no vomiting  Complications: no  Cardiovascular status: hemodynamically stable  Respiratory status: acceptable  Hydration status: stable

## 2021-10-09 NOTE — PROGRESS NOTES
[facility] Ob/Gyn  Post Partum Progress Note    Subjective:   Patient is a 27 y.o. y/o  female S/P PLTCS with BS @ 38w2d  EGA. POD # 1    The pregnancy was complicated by:   Patient Active Problem List   Diagnosis    Anxiety and depression    Insomnia    Smoking    Herpes genitalia    Prenatal care in third trimester    Nausea and vomiting during pregnancy    Acquired syphilis    Insulin controlled gestational diabetes mellitus (GDM) in third trimester    History of shoulder dystocia in prior pregnancy    Family history of cancer of female genital organ    S/P primary low transverse        Doing fair today -- some issues with pain control this morning, recommend using narcotics as needed. No current complaints are noted including headache, change in vision, fever, chills, chest pain, shortness of breath, nausea, vomiting, diarrhea or constipation. The patient denies dizziness with ambulation or calf tenderness -- desires more ambulation today. Voiding spontaneously. Lochia is described as moderate. The patient plans to bottle feed. Circ today. Objective:   GENERAL APPEARANCE: alert, well appearing, in no apparent distress  LUNGS: resp effort normal   HEART: regular rate   ABDOMEN POSTPARTUM: benign non-tender, without masses or organomegaly palpable . Uterine Fundus firm, NT, Below umbilicus. Incision Dry/Clean/Intact.     EXTREMITIES: no redness or tenderness in the calves or thighs, no edema  SKIN: normal coloration and turgor, no rashes, no suspicious skin lesions noted    Pertinent Labs:   12.8/37.2 > 10.7/31.3     Assessment / Plan:  1) POD #1    - meeting post partum milestones    - hemodynamically stable    - continue pain control     2) GMDA2   - continue BS checks today     3) Male - s/p circ     4) HSV - s/p acyclovir      Disposition:   Post Partum Instructions reviewed   Anticipate discharge on POD # 2 or 3  pending clinical status     Jesenia Grimm DO

## 2021-10-10 VITALS
RESPIRATION RATE: 18 BRPM | WEIGHT: 265 LBS | BODY MASS INDEX: 45.24 KG/M2 | TEMPERATURE: 98.4 F | SYSTOLIC BLOOD PRESSURE: 129 MMHG | OXYGEN SATURATION: 100 % | HEIGHT: 64 IN | DIASTOLIC BLOOD PRESSURE: 63 MMHG | HEART RATE: 78 BPM

## 2021-10-10 LAB
GLUCOSE BLD-MCNC: 110 MG/DL (ref 70–99)
PERFORMED ON: ABNORMAL

## 2021-10-10 PROCEDURE — 99024 POSTOP FOLLOW-UP VISIT: CPT | Performed by: OBSTETRICS & GYNECOLOGY

## 2021-10-10 PROCEDURE — 6370000000 HC RX 637 (ALT 250 FOR IP): Performed by: OBSTETRICS & GYNECOLOGY

## 2021-10-10 RX ORDER — DOCUSATE SODIUM 100 MG/1
100 CAPSULE, LIQUID FILLED ORAL 2 TIMES DAILY
Qty: 60 CAPSULE | Refills: 0 | Status: SHIPPED | OUTPATIENT
Start: 2021-10-10 | End: 2021-12-16

## 2021-10-10 RX ORDER — OXYCODONE HYDROCHLORIDE AND ACETAMINOPHEN 5; 325 MG/1; MG/1
1 TABLET ORAL EVERY 6 HOURS PRN
Qty: 28 TABLET | Refills: 0 | Status: SHIPPED | OUTPATIENT
Start: 2021-10-10 | End: 2021-10-17

## 2021-10-10 RX ORDER — IBUPROFEN 800 MG/1
800 TABLET ORAL EVERY 8 HOURS PRN
Qty: 30 TABLET | Refills: 0 | Status: ON HOLD | OUTPATIENT
Start: 2021-10-10 | End: 2021-12-09 | Stop reason: HOSPADM

## 2021-10-10 RX ADMIN — IBUPROFEN 800 MG: 800 TABLET, FILM COATED ORAL at 03:04

## 2021-10-10 RX ADMIN — OXYCODONE 5 MG: 5 TABLET ORAL at 09:13

## 2021-10-10 RX ADMIN — METFORMIN HYDROCHLORIDE 1 TABLET: 500 TABLET, EXTENDED RELEASE ORAL at 09:13

## 2021-10-10 RX ADMIN — IBUPROFEN 800 MG: 800 TABLET, FILM COATED ORAL at 14:07

## 2021-10-10 RX ADMIN — OXYCODONE 5 MG: 5 TABLET ORAL at 02:21

## 2021-10-10 RX ADMIN — ACETAMINOPHEN 1000 MG: 500 TABLET ORAL at 06:11

## 2021-10-10 ASSESSMENT — PAIN SCALES - GENERAL
PAINLEVEL_OUTOF10: 5
PAINLEVEL_OUTOF10: 5
PAINLEVEL_OUTOF10: 6
PAINLEVEL_OUTOF10: 6
PAINLEVEL_OUTOF10: 4

## 2021-10-10 NOTE — DISCHARGE SUMMARY
Department of Obstetrics and Gynecology  Postpartum Discharge Summary      Admit Date: 10/8/2021    Admit Diagnosis: History of shoulder dystocia in prior pregnancy [Z87.59]  History of shoulder dystocia in prior pregnancy [Z87.59]    Discharge Date: 10/10/21    Condition at Discharge: good    Discharge Diagnoses: PLTCS    Discharge Disposition:  Home    Service: Obstetrics    Postpartum complications: none     Hospital Course: uncomplicated    [place] Data:  Information for the patient's : Ed Forrest [[id number]]        Weight   Information for the patient's : Ed Forrest [[id number]]        Apgars   Information for the patient's : Ed Forrest [[id number]]         Disposition of Baby:  Home with mother      Current Discharge Medication List      START taking these medications    Details   ibuprofen (ADVIL;MOTRIN) 800 MG tablet Take 1 tablet by mouth every 8 hours as needed for Pain  Qty: 30 tablet, Refills: 0      oxyCODONE-acetaminophen (PERCOCET) 5-325 MG per tablet Take 1 tablet by mouth every 6 hours as needed for Pain for up to 7 days. Intended supply: 7 days.  Take lowest dose possible to manage pain  Qty: 28 tablet, Refills: 0    Comments: Reduce doses taken as pain becomes manageable  Associated Diagnoses: S/P primary low transverse       docusate sodium (COLACE) 100 MG capsule Take 1 capsule by mouth 2 times daily  Qty: 60 capsule, Refills: 0         CONTINUE these medications which have NOT CHANGED    Details   Prenatal Vit-Fe Fumarate-FA (PRENATAL 1+1 PO) Take by mouth daily         STOP taking these medications       acyclovir (ZOVIRAX) 400 MG tablet Comments:   Reason for Stopping:         insulin NPH (HUMULIN N) 100 UNIT/ML injection vial Comments:   Reason for Stopping:         INSULIN SYRINGE .5CC/29G 29G X 1/2\" 0.5 ML MISC Comments:   Reason for Stopping:         pyridoxine (B-6) 25 MG tablet Comments:   Reason for Stopping: FU in 2 weeks     Electronically signed by Ugo Dozier DO on 10/10/2021 at 12:03 PM

## 2021-10-10 NOTE — FLOWSHEET NOTE
Discharge Phone Call Log  Patient Name: Kushal Barrera     [facility] Care Provider: Yared Schmid MD Discharge Date: 10/10/2021    Disposition of baby:    Phone Number: [phone] (home)     Attempts to Contact:  Date:    Nurse  Date:    Nurse  Date:    Nurse    1. Now that you are at home is your pain being well controlled? Y/N   What pain reducing measures are you using? ____________________________________        Information for the patient's : Ed Forrest [[id number]]   Delivery Method: , Low Transverse     2. Are you currently  having any infant feeding issues? Y/N _____________________________ If yes, please explain: __________________________________________________________________  3. If breastfeeding, were you satisfied with the breastfeeding support services offered? Y/N  4.  Have you had to supplement? Y/N If yes, please explain: _____________________________________________________       Did you supplement while in the hospital, or begin formula supplementation at home?________________________________________  5. Did your OB provider offer you information about the benefits of breastfeeding during your prenatal visits? Y/N  6.  Have you made or have you already had your first appointment with the baby's doctor? Y/N If no, do you know when to schedule it? Y/N   7.  Have you scheduled your follow-up appointment? Y/N  If no, do you know when to schedule it? Y/N  8. Did staff discuss safe sleep during your stay? Y/N  Did you see the wall cling posted in your room explaining how to keep you and your baby safe? Y/N  10. Did your nurses and physicians include you in the plan of care, communicating with you respectfully? Y/N If no, please explain __________________________  11. Is there anyone in particular you would like to mention who provided care for you? ________________________________  12. Did your discharge occur in a timely manner?   Y/N If no, please explain

## 2021-10-10 NOTE — DISCHARGE INSTR - ACTIVITY
Call for increased pain, significant vaginal bleeding (soaking through 2 pads in < 1hr) or temperature greater than 101 degrees F. Nothing in vagina for 6 weeks (pelvic rest), including intercourse, tampons, toys, fingers. Advance activity as tolerated but limit lifting <10 lbs or weight of baby in carrier for first 2 weeks. Continue PNV. Take PRN medications as prescribed. FU in office in 2 weeks. Normal Diet     Please call with questions or concerns.    Shekhar Wright, DO

## 2021-10-29 ENCOUNTER — POSTPARTUM VISIT (OUTPATIENT)
Dept: OBGYN CLINIC | Age: 26
End: 2021-10-29

## 2021-10-29 VITALS
DIASTOLIC BLOOD PRESSURE: 78 MMHG | TEMPERATURE: 98 F | BODY MASS INDEX: 41.81 KG/M2 | SYSTOLIC BLOOD PRESSURE: 112 MMHG | HEART RATE: 97 BPM | WEIGHT: 243.6 LBS

## 2021-10-29 DIAGNOSIS — O24.414 INSULIN CONTROLLED GESTATIONAL DIABETES MELLITUS (GDM) IN THIRD TRIMESTER: ICD-10-CM

## 2021-10-29 DIAGNOSIS — Z98.891 S/P PRIMARY LOW TRANSVERSE C-SECTION: Primary | ICD-10-CM

## 2021-10-29 PROCEDURE — 0503F POSTPARTUM CARE VISIT: CPT | Performed by: OBSTETRICS & GYNECOLOGY

## 2021-10-29 NOTE — PROGRESS NOTES
Postpartum Visit    Subjective:  27 y.o. [id number] female S/P uncomplicated  on 10/8/21 here for postpartum visit. The pregnancy was complicated by [id number], smoking, h/o shoulder dystocia with fetal injury, family history of ovarian and endometrial cancer, syphiils, herpes and anxiety. Postpartum course has been uncomplicated. Pain is overall well controlled, not taking any medications now. Bleeding is very light. Bowel function is normal. Bladder function is normal. Patient is not sexually active. Contraception method is tubal ligation. Baby has been doing well without problems. Baby is feeding by bottle. No other complaints are noted including headache, change in vision, fever, chills, chest pain, shortness of breath, nausea, vomiting, diarrhea or constipation. The patient denies any urinary complaints or calf tenderness. Review of Systems - The following ROS was otherwise negative, except as noted in the HPI: constitutional, respiratory, cardiovascular, gastrointestinal, genitourinary    Objective:  GENERAL APPEARANCE: alert, well appearing, in no apparent distress  LUNGS: clear to auscultation, no wheezes, rales or rhonchi, symmetric air entry  HEART: regular rate and rhythm  ABDOMEN POSTPARTUM: benign non-tender, without masses or organomegaly palpable, incision well-healed, without erythema, without hematoma and without evidence of infection  EXTREMITIES: no redness or tenderness in the calves or thighs, no edema    MWQ: reassuring, no SI/HI, denies any PPD or baby blues    Impression:  27 y.o. [id number] s/p  Section on 10/8/21 here for her postpartum visit:    Plan:  1. S/P primary low transverse   Doing well, routine care  - Feeding: bottle, going well  - BCM: s/p salpingectomy  - Moods: no signs/sx PPD, denies SI/HI  - Bleeding: minimal    2.  Insulin controlled gestational diabetes mellitus (GDM) in third trimester  Will need 2hr GTT postpartum      Dispo: 4 weeks for Jacoby Barrera MD

## 2021-11-01 PROBLEM — O21.9 NAUSEA AND VOMITING DURING PREGNANCY: Status: RESOLVED | Noted: 2021-03-30 | Resolved: 2021-11-01

## 2021-11-01 PROBLEM — Z34.93 PRENATAL CARE IN THIRD TRIMESTER: Status: RESOLVED | Noted: 2021-03-30 | Resolved: 2021-11-01

## 2021-11-23 ENCOUNTER — POSTPARTUM VISIT (OUTPATIENT)
Dept: OBGYN CLINIC | Age: 26
End: 2021-11-23

## 2021-11-23 VITALS
TEMPERATURE: 98.4 F | HEART RATE: 90 BPM | BODY MASS INDEX: 41.95 KG/M2 | DIASTOLIC BLOOD PRESSURE: 96 MMHG | SYSTOLIC BLOOD PRESSURE: 134 MMHG | WEIGHT: 244.4 LBS

## 2021-11-23 DIAGNOSIS — O24.414 INSULIN CONTROLLED GESTATIONAL DIABETES MELLITUS (GDM) IN THIRD TRIMESTER: ICD-10-CM

## 2021-11-23 DIAGNOSIS — Z98.891 S/P PRIMARY LOW TRANSVERSE C-SECTION: Primary | ICD-10-CM

## 2021-11-23 PROCEDURE — 0503F POSTPARTUM CARE VISIT: CPT | Performed by: OBSTETRICS & GYNECOLOGY

## 2021-11-23 NOTE — PROGRESS NOTES
Postpartum Visit    Subjective:  27 y.o. [id number] female S/P uncomplicated  on 10/8/21 here for postpartum visit. The pregnancy was complicated by [id number], smoking, h/o shoulder dystocia with fetal injury, family history of ovarian and endometrial cancer, syphiils, herpes and anxiety. Postpartum course has been uncomplicated. Pain overall well controlled,  on her skin above the incision, has been improving though. Bleeding has resolved recently. Bowel function is normal. Bladder function is normal. Patient is not sexually active. Contraception method is abstinence. Baby has been doing well without problems. Baby is feeding by bottle/formula. No other complaints are noted including headache, change in vision, fever, chills, chest pain, shortness of breath, nausea, vomiting, diarrhea or constipation. The patient denies any urinary complaints or calf tenderness. Review of Systems - The following ROS was otherwise negative, except as noted in the HPI: constitutional, respiratory, cardiovascular, gastrointestinal, genitourinary    Objective:  GENERAL APPEARANCE: alert, well appearing, in no apparent distress  LUNGS: clear to auscultation, no wheezes, rales or rhonchi, symmetric air entry  HEART: regular rate and rhythm  ABDOMEN POSTPARTUM: incision well-healed, without erythema, without hematoma and without evidence of infection  EXTREMITIES: no redness or tenderness in the calves or thighs, no edema    MWQ: no SI/HI, no concerns    Impression:  27 y.o. [id number] s/p  Section on 10/8/21 here for her postpartum visit:    Plan:  1. S/P primary low transverse   Doing well, routine care  - Feeding: bottle  - BCM: s/p salpingectomy  - Moods: no signs/sx PPD, denies SI/HI  - Bleeding: resolved    2.  Insulin controlled gestational diabetes mellitus (GDM) in third trimester  Plan 2hr GTT at 12 weeks PP      Dispo: PRN or 2 months for 2hr GTT  Eric Edmonds MD

## 2021-12-07 ENCOUNTER — HOSPITAL ENCOUNTER (EMERGENCY)
Age: 26
Discharge: ANOTHER HOSPITAL | End: 2021-12-08
Attending: STUDENT IN AN ORGANIZED HEALTH CARE EDUCATION/TRAINING PROGRAM
Payer: COMMERCIAL

## 2021-12-07 ENCOUNTER — APPOINTMENT (OUTPATIENT)
Dept: CT IMAGING | Age: 26
End: 2021-12-07
Payer: COMMERCIAL

## 2021-12-07 ENCOUNTER — TELEPHONE (OUTPATIENT)
Dept: FAMILY MEDICINE CLINIC | Age: 26
End: 2021-12-07

## 2021-12-07 ENCOUNTER — APPOINTMENT (OUTPATIENT)
Dept: GENERAL RADIOLOGY | Age: 26
End: 2021-12-07
Payer: COMMERCIAL

## 2021-12-07 DIAGNOSIS — R42 VERTIGO: ICD-10-CM

## 2021-12-07 DIAGNOSIS — R11.0 NAUSEA: Primary | ICD-10-CM

## 2021-12-07 LAB
A/G RATIO: 1.3 (ref 1.1–2.2)
ALBUMIN SERPL-MCNC: 4.4 G/DL (ref 3.4–5)
ALP BLD-CCNC: 91 U/L (ref 40–129)
ALT SERPL-CCNC: 39 U/L (ref 10–40)
ANION GAP SERPL CALCULATED.3IONS-SCNC: 10 MMOL/L (ref 3–16)
AST SERPL-CCNC: 23 U/L (ref 15–37)
BASOPHILS ABSOLUTE: 0 K/UL (ref 0–0.2)
BASOPHILS RELATIVE PERCENT: 0.4 %
BILIRUB SERPL-MCNC: <0.2 MG/DL (ref 0–1)
BUN BLDV-MCNC: 11 MG/DL (ref 7–20)
CALCIUM SERPL-MCNC: 9.5 MG/DL (ref 8.3–10.6)
CHLORIDE BLD-SCNC: 105 MMOL/L (ref 99–110)
CO2: 24 MMOL/L (ref 21–32)
CREAT SERPL-MCNC: 0.7 MG/DL (ref 0.6–1.1)
EKG ATRIAL RATE: 74 BPM
EKG DIAGNOSIS: NORMAL
EKG P AXIS: 33 DEGREES
EKG P-R INTERVAL: 130 MS
EKG Q-T INTERVAL: 410 MS
EKG QRS DURATION: 94 MS
EKG QTC CALCULATION (BAZETT): 455 MS
EKG R AXIS: 61 DEGREES
EKG T AXIS: 16 DEGREES
EKG VENTRICULAR RATE: 74 BPM
EOSINOPHILS ABSOLUTE: 0.1 K/UL (ref 0–0.6)
EOSINOPHILS RELATIVE PERCENT: 0.9 %
GFR AFRICAN AMERICAN: >60
GFR NON-AFRICAN AMERICAN: >60
GLUCOSE BLD-MCNC: 139 MG/DL (ref 70–99)
GLUCOSE BLD-MCNC: 147 MG/DL (ref 70–99)
HCG QUALITATIVE: NEGATIVE
HCT VFR BLD CALC: 42.8 % (ref 36–48)
HEMOGLOBIN: 14.1 G/DL (ref 12–16)
LYMPHOCYTES ABSOLUTE: 1.6 K/UL (ref 1–5.1)
LYMPHOCYTES RELATIVE PERCENT: 18.8 %
MCH RBC QN AUTO: 26.5 PG (ref 26–34)
MCHC RBC AUTO-ENTMCNC: 32.9 G/DL (ref 31–36)
MCV RBC AUTO: 80.6 FL (ref 80–100)
MONOCYTES ABSOLUTE: 0.2 K/UL (ref 0–1.3)
MONOCYTES RELATIVE PERCENT: 2.2 %
NEUTROPHILS ABSOLUTE: 6.7 K/UL (ref 1.7–7.7)
NEUTROPHILS RELATIVE PERCENT: 77.7 %
PDW BLD-RTO: 15.6 % (ref 12.4–15.4)
PERFORMED ON: ABNORMAL
PLATELET # BLD: 319 K/UL (ref 135–450)
PMV BLD AUTO: 7.2 FL (ref 5–10.5)
POTASSIUM REFLEX MAGNESIUM: 4.5 MMOL/L (ref 3.5–5.1)
RBC # BLD: 5.31 M/UL (ref 4–5.2)
SODIUM BLD-SCNC: 139 MMOL/L (ref 136–145)
TOTAL PROTEIN: 7.7 G/DL (ref 6.4–8.2)
TROPONIN: <0.01 NG/ML
WBC # BLD: 8.6 K/UL (ref 4–11)

## 2021-12-07 PROCEDURE — 84703 CHORIONIC GONADOTROPIN ASSAY: CPT

## 2021-12-07 PROCEDURE — 84484 ASSAY OF TROPONIN QUANT: CPT

## 2021-12-07 PROCEDURE — 80053 COMPREHEN METABOLIC PANEL: CPT

## 2021-12-07 PROCEDURE — 70498 CT ANGIOGRAPHY NECK: CPT

## 2021-12-07 PROCEDURE — 6370000000 HC RX 637 (ALT 250 FOR IP): Performed by: STUDENT IN AN ORGANIZED HEALTH CARE EDUCATION/TRAINING PROGRAM

## 2021-12-07 PROCEDURE — 85025 COMPLETE CBC W/AUTO DIFF WBC: CPT

## 2021-12-07 PROCEDURE — 93010 ELECTROCARDIOGRAM REPORT: CPT | Performed by: INTERNAL MEDICINE

## 2021-12-07 PROCEDURE — 93005 ELECTROCARDIOGRAM TRACING: CPT | Performed by: STUDENT IN AN ORGANIZED HEALTH CARE EDUCATION/TRAINING PROGRAM

## 2021-12-07 PROCEDURE — 70450 CT HEAD/BRAIN W/O DYE: CPT

## 2021-12-07 PROCEDURE — 99285 EMERGENCY DEPT VISIT HI MDM: CPT

## 2021-12-07 PROCEDURE — 36415 COLL VENOUS BLD VENIPUNCTURE: CPT

## 2021-12-07 PROCEDURE — 6360000004 HC RX CONTRAST MEDICATION: Performed by: STUDENT IN AN ORGANIZED HEALTH CARE EDUCATION/TRAINING PROGRAM

## 2021-12-07 PROCEDURE — 71045 X-RAY EXAM CHEST 1 VIEW: CPT

## 2021-12-07 PROCEDURE — 2580000003 HC RX 258: Performed by: STUDENT IN AN ORGANIZED HEALTH CARE EDUCATION/TRAINING PROGRAM

## 2021-12-07 RX ORDER — MECLIZINE HYDROCHLORIDE CHEWABLE TABLETS 25 MG/1
25 TABLET, CHEWABLE ORAL ONCE
Status: COMPLETED | OUTPATIENT
Start: 2021-12-07 | End: 2021-12-07

## 2021-12-07 RX ORDER — 0.9 % SODIUM CHLORIDE 0.9 %
1000 INTRAVENOUS SOLUTION INTRAVENOUS ONCE
Status: COMPLETED | OUTPATIENT
Start: 2021-12-07 | End: 2021-12-07

## 2021-12-07 RX ORDER — ASPIRIN 81 MG/1
324 TABLET, CHEWABLE ORAL ONCE
Status: COMPLETED | OUTPATIENT
Start: 2021-12-07 | End: 2021-12-07

## 2021-12-07 RX ADMIN — ASPIRIN 324 MG: 81 TABLET, CHEWABLE ORAL at 13:02

## 2021-12-07 RX ADMIN — SODIUM CHLORIDE 1000 ML: 9 INJECTION, SOLUTION INTRAVENOUS at 11:49

## 2021-12-07 RX ADMIN — IOPAMIDOL 85 ML: 755 INJECTION, SOLUTION INTRAVENOUS at 08:14

## 2021-12-07 RX ADMIN — MECLIZINE HCL 25 MG: 25 TABLET, CHEWABLE ORAL at 08:21

## 2021-12-07 RX ADMIN — MECLIZINE HCL 25 MG: 25 TABLET, CHEWABLE ORAL at 11:49

## 2021-12-07 ASSESSMENT — PAIN SCALES - GENERAL: PAINLEVEL_OUTOF10: 1

## 2021-12-07 NOTE — ED PROVIDER NOTES
[address] ED      CHIEF COMPLAINT  Dizziness (started last night about 2200; room spinning; started vomiting at 0300)       Pearl Mac is a 27 y.o. female  who presents to the ED complaining of vertigo with nausea and vomiting. Patient is about 8 weeks postpartum. She states that last night around 10 PM she was semireclined burping her baby when he started having a spinning sensation. She states that it has been worsened with head movement, but has been continuous since its onset. This pain has been so severe as to make her nauseous with vomiting twice. She denies any chest pain, shortness of breath, abdominal pain, headache. She is currently having vertigo. She states she has had this once before with a migraine, but states this does not feel the same. No other complaints, modifying factors or associated symptoms. I have reviewed the following from the nursing documentation.     Past Medical History:   Diagnosis Date    Depression     history of 3047-0734    Fx one rib-open     LEFT SIDE    Herpes simplex virus (HSV) infection     Insulin controlled gestational diabetes mellitus (GDM) in third trimester 2021    PTSD (post-traumatic stress disorder)      Past Surgical History:   Procedure Laterality Date     SECTION N/A 10/8/2021     SECTION performed by Jasen Medrano MD at [facility] L[facility]D [facility]    MOUTH SURGERY      CYST    TONSILLECTOMY       Family History   Problem Relation Age of Onset    Cancer Mother 39        uterine ca    Migraines Mother     High Blood Pressure Father     High Cholesterol Father    Looney Migraines Sister     Diabetes Maternal Grandmother     Celiac Disease Maternal Grandmother     Cancer Maternal Grandfather         bladder ca with mets     Social History     Socioeconomic History    Marital status: Single     Spouse name: Not on file    Number of children: Not on file    Years of education: Not on file  Highest education level: Not on file   Occupational History    Not on file   Tobacco Use    Smoking status: Current Every Day Smoker     Packs/day: 0.50     Years: 6.00     Pack years: 3.00     Last attempt to quit: 1/22/2018     Years since quitting: 3.8    Smokeless tobacco: Never Used    Tobacco comment: down 4-5 cig a day   Vaping Use    Vaping Use: Never used   Substance and Sexual Activity    Alcohol use: Not Currently    Drug use: Never    Sexual activity: Yes     Partners: Male   Other Topics Concern    Not on file   Social History Narrative    Not on file     Social Determinants of Health     Financial Resource Strain:     Difficulty of Paying Living Expenses: Not on file   Food Insecurity:     Worried About Running Out of Food in the Last Year: Not on file    Natalio of Food in the Last Year: Not on file   Transportation Needs:     Lack of Transportation (Medical): Not on file    Lack of Transportation (Non-Medical):  Not on file   Physical Activity:     Days of Exercise per Week: Not on file    Minutes of Exercise per Session: Not on file   Stress:     Feeling of Stress : Not on file   Social Connections:     Frequency of Communication with Friends and Family: Not on file    Frequency of Social Gatherings with Friends and Family: Not on file    Attends Sabianism Services: Not on file    Active Member of [address] [place] [address] or Organizations: Not on file    Attends Club or Organization Meetings: Not on file    Marital Status: Not on file   Intimate Partner Violence:     Fear of Current or Ex-Partner: Not on file    Emotionally Abused: Not on file    Physically Abused: Not on file    Sexually Abused: Not on file   Housing Stability:     Unable to Pay for Housing in the Last Year: Not on file    Number of [place] in the Last Year: Not on file    Unstable Housing in the Last Year: Not on file     Current Facility-Administered Medications   Medication Dose Route Frequency Provider Last Rate Last Admin    meclizine (ANTIVERT) chewable tablet 25 mg  25 mg Oral Once Alverna Mini, DO        iopamidol (ISOVUE-370) 76 % injection 85 mL  85 mL IntraVENous ONCE PRN Alverna Mini, DO         Current Outpatient Medications   Medication Sig Dispense Refill    ibuprofen (ADVIL;MOTRIN) 800 MG tablet Take 1 tablet by mouth every 8 hours as needed for Pain (Patient not taking: Reported on 10/29/2021) 30 tablet 0    docusate sodium (COLACE) 100 MG capsule Take 1 capsule by mouth 2 times daily 60 capsule 0    Prenatal Vit-Fe Fumarate-FA (PRENATAL 1+1 PO) Take by mouth daily (Patient not taking: Reported on 10/29/2021)       Allergies   Allergen Reactions    Sulfa Antibiotics        REVIEW OF SYSTEMS  10 systems reviewed, pertinent positives per HPI otherwise noted to be negative. PHYSICAL EXAM  Pulse 78   Resp 16   Ht 5' 4\" (1.626 m)   Wt 245 lb (111.1 kg)   LMP 12/01/2021   SpO2 100%   BMI 42.05 kg/m²    General: Appears uncomfortable. Alert  HEENT: Head atraumatic, PERRL. Bilateral horizontal nystagmus as well as bilateral rotary nystagmus. Normal ENT inspection, Pharynx normal. No signs of dehydration  NECK: Normal inspection  RESPIRATORY: Normal breath sounds. No chest wall tenderness. No respiratory distress  CVS: Heart rate and rhythm regular. No Murmurs  ABDOMEN/GI: Soft, Non-tender, No distention  BACK: Normal inspection  EXTREMITIES: Non-Tender. Full ROM. Normal appearance. No Pedal edema  NEURO: Alert and oriented. Sensation normal. Motor normal  PSYCH: Mood normal. Affect normal.  SKIN: Color normal. No rash. Warm, Dry    LABS  I have reviewed all labs for this visit.    Results for orders placed or performed during the hospital encounter of 12/07/21   CBC Auto Differential   Result Value Ref Range    WBC 8.6 4.0 - 11.0 K/uL    RBC 5.31 (H) 4.00 - 5.20 M/uL    Hemoglobin 14.1 12.0 - 16.0 g/dL    Hematocrit 42.8 36.0 - 48.0 %    MCV 80.6 80.0 - 100.0 fL    MCH 26.5 26.0 - 34.0 pg MCHC 32.9 31.0 - 36.0 g/dL    RDW 15.6 (H) 12.4 - 15.4 %    Platelets 948 345 - 973 K/uL    MPV 7.2 5.0 - 10.5 fL    Neutrophils % 77.7 %    Lymphocytes % 18.8 %    Monocytes % 2.2 %    Eosinophils % 0.9 %    Basophils % 0.4 %    Neutrophils Absolute 6.7 1.7 - 7.7 K/uL    Lymphocytes Absolute 1.6 1.0 - 5.1 K/uL    Monocytes Absolute 0.2 0.0 - 1.3 K/uL    Eosinophils Absolute 0.1 0.0 - 0.6 K/uL    Basophils Absolute 0.0 0.0 - 0.2 K/uL   Comprehensive Metabolic Panel w/ Reflex to MG   Result Value Ref Range    Sodium 139 136 - 145 mmol/L    Potassium reflex Magnesium 4.5 3.5 - 5.1 mmol/L    Chloride 105 99 - 110 mmol/L    CO2 24 21 - 32 mmol/L    Anion Gap 10 3 - 16    Glucose 147 (H) 70 - 99 mg/dL    BUN 11 7 - 20 mg/dL    CREATININE 0.7 0.6 - 1.1 mg/dL    GFR Non-African American >60 >60    GFR African American >60 >60    Calcium 9.5 8.3 - 10.6 mg/dL    Total Protein 7.7 6.4 - 8.2 g/dL    Albumin 4.4 3.4 - 5.0 g/dL    Albumin/Globulin Ratio 1.3 1.1 - 2.2    Total Bilirubin <0.2 0.0 - 1.0 mg/dL    Alkaline Phosphatase 91 40 - 129 U/L    ALT 39 10 - 40 U/L    AST 23 15 - 37 U/L   Troponin   Result Value Ref Range    Troponin <0.01 <0.01 ng/mL   HCG Qualitative, Serum   Result Value Ref Range    hCG Qual Negative Detects HCG level >10 MIU/mL   POCT Glucose   Result Value Ref Range    POC Glucose 139 (H) 70 - 99 mg/dl    Performed on [employer]    EKG 12 Lead   Result Value Ref Range    Ventricular Rate 74 BPM    Atrial Rate 74 BPM    P-R Interval 130 ms    QRS Duration 94 ms    Q-T Interval 410 ms    QTc Calculation (Bazett) 455 ms    P Axis 33 degrees    R Axis 61 degrees    T Axis 16 degrees    Diagnosis       Normal sinus rhythm with sinus arrhythmiaNonspecific T wave abnormalityConfirmed by MARTIN LUCAS MD ([id number]) on 12/7/2021 10:42:42 AM       ECG  The Ekg interpreted by me shows  Sinus rhythm with sinus arrhythmia and a rate of 74 bpm.  Normal axis. No acute injury pattern.   , QRS 94, QTc 455.    RADIOLOGY  XR CHEST PORTABLE   Final Result   No acute cardiopulmonary disease. CT Head WO Contrast   Preliminary Result   No evidence of acute intracranial abnormality on a study limited by   motion/streak artifact as described above. Critical results were called by Dr. Kirsten Noonan. Paulina Jaimes MD to Dr. Yumiko Allen On   12/7/2021 at 08:23. CTA HEAD NECK W CONTRAST   Final Result   Unremarkable CTA of the head and neck. Critical results were called by Dr. Kenji Zaman MD to Dr. Yumiko Allen on   12/7/2021 at 10:05. ED COURSE/MDM  Patient seen and evaluated. Old records reviewed. Labs and imaging reviewed and results discussed with patient. Presented with persistent vertigo. Due to her symptoms and concerning hints exam, which is difficult to assess due to the patient's resting rotary nystagmus, stroke alert called. Discussed with stroke team, who agrees with CT and CTA. They agree that she is outside of the TPA window. They agree with meclizine for symptom control. CT imaging shows no acute process. On reevaluation patient is resting more comfortably, however when she opens her eyes she still has rotary nystagmus. She is given a second dose of meclizine. Neurology consulted and they agree with admission for further evaluation and rule out posterior circulation stroke. Discussed with hospitalist at [facility], [facility]. who agrees to admit the patient to her service. Critical care    Critical care time 32 minutes exclusive from separate billable procedures that were performed. The following was considered in the determination of critical care but not limited to the level of medical decision making, intensive cardiac and/or respiratory monitoring, frequent vital sign monitoring, evaluation of laboratory studies, evaluation of radiographic studies, oxygen monitoring, and constant monitoring and speaking to family at bedside.     During the patient's ED course, the patient was given: Medications   meclizine (ANTIVERT) chewable tablet 25 mg (25 mg Oral Given 12/7/21 0821)   iopamidol (ISOVUE-370) 76 % injection 85 mL (85 mLs IntraVENous Given 12/7/21 0814)   meclizine (ANTIVERT) chewable tablet 25 mg (25 mg Oral Given 12/7/21 1149)   0.9 % sodium chloride bolus (0 mLs IntraVENous Stopped 12/7/21 1257)   aspirin chewable tablet 324 mg (324 mg Oral Given 12/7/21 1302)        CLINICAL IMPRESSION  1. Nausea    2. Vertigo        Pulse 78, resp. rate 16, height 5' 4\" (1.626 m), weight 245 lb (111.1 kg), last menstrual period 12/01/2021, SpO2 100 %, unknown if currently breastfeeding. Earnest Teran was transferred to [facility], [facility] in stable condition. Patient was given scripts for the following medications. I counseled patient how to take these medications. New Prescriptions    No medications on file       Follow-up with:  No follow-up provider specified. DISCLAIMER: This chart was created using Dragon dictation software. Efforts were made by me to ensure accuracy, however some errors may be present due to limitations of this technology and occasionally words are not transcribed correctly.        Surekha Rivera DO  12/07/21 4950

## 2021-12-07 NOTE — ED NOTES
Pt resting on left side with easy unlabored respirations. Pt denies needs but lunch tray ordered. VS stable and recorded. Pt updated on likely transfer status.        Marianna Alfonso RN  12/07/21 2927

## 2021-12-07 NOTE — PLAN OF CARE
STROKE TEAM PLAN OF CARE    Name:  Irene Yoon (59 y.o., female)  : 1995  MRN:  [id number]  Today's Date: 2021    Stroke team contacted at: 08:01  History obtained from: emergency physician    Irene Yoon is a 27 y.o. female who presented with dizziness. Pt was LKW last night. At 10pm, she started experiencing dizziness, described as vertigo. Symptoms persisted overnight and into this AM. On exam in the ED, significant nystagmus was noted. No nystagmus or focal weakness noted. Of note, pt is 8 wks post-partum. CT (my read): no acute  CTA (my read): no LVO or critical, flow limiting stenosis    Acute Ischemic Stroke Clinical Decision Making:    (1) Intravenous tPA:    The patient is not a candidate for iv TPA based on:  Time greater than 4.5h from symptom onset, alternative diagnoses considered    (2) Angiography / Thrombectomy:  The patient does not have evidence of a proximal large vessel occlusion on CTA, and therefore is not an EVT candidate. For any additional questions regarding acute stroke care, please feel free to contact the  Stroke Team at [phone].      MD Jessa   Stroke Team   2021 10:17 AM
Continue Regimen: Daily use of sunscreen
Detail Level: Zone

## 2021-12-07 NOTE — ED NOTES
Call placed to [facility] for neurology consult     Brody Diallo RN  12/07/21 9903

## 2021-12-07 NOTE — TELEPHONE ENCOUNTER
On call page rec'd at 5:03 a.m. Pt stated that she was a previous pt of Ruddy Dupont CNP whom is not longer a provider at our practice. Her last OV with her was in 2019. She c/o dizziness that is worse upon standing. States that when she tries to stand up she \"just falls\". She has also had some vomiting with the dizziness. She states that her boyfriend lives with her, but he had to take care of their children. I recommended that she call 911 if she was unable to stand d/t falling and vomiting. She will eventually need to call the office to schedule an appointment to establish care with a new PCP. Pt VU and agrees with POC.

## 2021-12-07 NOTE — ED NOTES
Writer validated VS pt O2 91-92% RA. Writer entered room, pt sleeping and O2 sensor not properly adhered. Writer adjusted; pt now 96% RA. Will continue to assess.       June Sutherland RN  12/07/21 8903

## 2021-12-08 ENCOUNTER — APPOINTMENT (OUTPATIENT)
Dept: MRI IMAGING | Age: 26
DRG: 149 | End: 2021-12-08
Attending: INTERNAL MEDICINE
Payer: COMMERCIAL

## 2021-12-08 ENCOUNTER — HOSPITAL ENCOUNTER (INPATIENT)
Age: 26
LOS: 1 days | Discharge: HOME | DRG: 149 | End: 2021-12-09
Attending: INTERNAL MEDICINE | Admitting: INTERNAL MEDICINE
Payer: COMMERCIAL

## 2021-12-08 VITALS
OXYGEN SATURATION: 96 % | WEIGHT: 245 LBS | DIASTOLIC BLOOD PRESSURE: 56 MMHG | HEART RATE: 61 BPM | BODY MASS INDEX: 41.83 KG/M2 | RESPIRATION RATE: 12 BRPM | HEIGHT: 64 IN | SYSTOLIC BLOOD PRESSURE: 97 MMHG | TEMPERATURE: 98.1 F

## 2021-12-08 PROCEDURE — 6370000000 HC RX 637 (ALT 250 FOR IP): Performed by: INTERNAL MEDICINE

## 2021-12-08 PROCEDURE — 70551 MRI BRAIN STEM W/O DYE: CPT

## 2021-12-08 PROCEDURE — 70546 MR ANGIOGRAPH HEAD W/O&W/DYE: CPT

## 2021-12-08 PROCEDURE — APPNB45 APP NON BILLABLE 31-45 MINUTES: Performed by: NURSE PRACTITIONER

## 2021-12-08 PROCEDURE — G0378 HOSPITAL OBSERVATION PER HR: HCPCS

## 2021-12-08 PROCEDURE — 97116 GAIT TRAINING THERAPY: CPT

## 2021-12-08 PROCEDURE — 97162 PT EVAL MOD COMPLEX 30 MIN: CPT

## 2021-12-08 PROCEDURE — A9579 GAD-BASE MR CONTRAST NOS,1ML: HCPCS | Performed by: NURSE PRACTITIONER

## 2021-12-08 PROCEDURE — 97535 SELF CARE MNGMENT TRAINING: CPT

## 2021-12-08 PROCEDURE — 1200000000 HC SEMI PRIVATE

## 2021-12-08 PROCEDURE — 92610 EVALUATE SWALLOWING FUNCTION: CPT

## 2021-12-08 PROCEDURE — 6360000004 HC RX CONTRAST MEDICATION: Performed by: NURSE PRACTITIONER

## 2021-12-08 PROCEDURE — 97530 THERAPEUTIC ACTIVITIES: CPT

## 2021-12-08 PROCEDURE — 96372 THER/PROPH/DIAG INJ SC/IM: CPT

## 2021-12-08 PROCEDURE — 6360000002 HC RX W HCPCS: Performed by: INTERNAL MEDICINE

## 2021-12-08 PROCEDURE — G0379 DIRECT REFER HOSPITAL OBSERV: HCPCS

## 2021-12-08 PROCEDURE — 97166 OT EVAL MOD COMPLEX 45 MIN: CPT

## 2021-12-08 RX ORDER — ONDANSETRON 4 MG/1
4 TABLET, ORALLY DISINTEGRATING ORAL EVERY 8 HOURS PRN
Status: DISCONTINUED | OUTPATIENT
Start: 2021-12-08 | End: 2021-12-09 | Stop reason: HOSPADM

## 2021-12-08 RX ORDER — ONDANSETRON 2 MG/ML
4 INJECTION INTRAMUSCULAR; INTRAVENOUS EVERY 6 HOURS PRN
Status: DISCONTINUED | OUTPATIENT
Start: 2021-12-08 | End: 2021-12-09 | Stop reason: HOSPADM

## 2021-12-08 RX ORDER — MECLIZINE HYDROCHLORIDE 25 MG/1
12.5 TABLET ORAL 3 TIMES DAILY PRN
Status: DISCONTINUED | OUTPATIENT
Start: 2021-12-08 | End: 2021-12-09 | Stop reason: HOSPADM

## 2021-12-08 RX ORDER — POLYETHYLENE GLYCOL 3350 17 G/17G
17 POWDER, FOR SOLUTION ORAL DAILY PRN
Status: DISCONTINUED | OUTPATIENT
Start: 2021-12-08 | End: 2021-12-09 | Stop reason: HOSPADM

## 2021-12-08 RX ORDER — ASPIRIN 81 MG/1
81 TABLET ORAL DAILY
Status: DISCONTINUED | OUTPATIENT
Start: 2021-12-08 | End: 2021-12-08

## 2021-12-08 RX ORDER — DIAZEPAM 2 MG/1
2 TABLET ORAL EVERY 6 HOURS PRN
Status: DISCONTINUED | OUTPATIENT
Start: 2021-12-08 | End: 2021-12-09 | Stop reason: HOSPADM

## 2021-12-08 RX ORDER — ASPIRIN 300 MG/1
300 SUPPOSITORY RECTAL DAILY
Status: DISCONTINUED | OUTPATIENT
Start: 2021-12-08 | End: 2021-12-08

## 2021-12-08 RX ADMIN — GADOTERIDOL 20 ML: 279.3 INJECTION, SOLUTION INTRAVENOUS at 12:47

## 2021-12-08 RX ADMIN — MECLIZINE HYDROCHLORIDE 12.5 MG: 25 TABLET ORAL at 08:52

## 2021-12-08 RX ADMIN — ASPIRIN 81 MG: 81 TABLET, COATED ORAL at 08:50

## 2021-12-08 RX ADMIN — DIAZEPAM 2 MG: 2 TABLET ORAL at 14:54

## 2021-12-08 RX ADMIN — ENOXAPARIN SODIUM 40 MG: 100 INJECTION SUBCUTANEOUS at 08:50

## 2021-12-08 ASSESSMENT — PAIN SCALES - GENERAL: PAINLEVEL_OUTOF10: 0

## 2021-12-08 NOTE — PROGRESS NOTES
4 Eyes Admission Assessment     I agree as the admission nurse that 2 RN's have performed a thorough Head to Toe Skin Assessment on the patient. ALL assessment sites listed below have been assessed on admission. Areas assessed by both nurses:  [x]   Head, Face, and Ears   [x]   Shoulders, Back, and Chest  [x]   Arms, Elbows, and Hands   [x]   Coccyx, Sacrum, and Ischium  [x]   Legs, Feet, and Heels        Does the Patient have Skin Breakdown?   No         Gregorio Prevention initiated:  No   Wound Care Orders initiated:  No      [facility] nurse consulted for Pressure Injury (Stage 3,4, Unstageable, DTI, NWPT, and Complex wounds) or Gregorio score 18 or lower:  No      Nurse 1 eSignature: Electronically signed by Adriana Schilling RN on 12/8/21 at 7:42 AM EST    **SHARE this note so that the co-signing nurse is able to place an eSignature**    Nurse 2 eSignature: Electronically signed by Junior Olmos RN on 12/8/21 at 7:44 AM EST

## 2021-12-08 NOTE — PROGRESS NOTES
Pt is A/O x4. VSS. Pt c/o dizziness. No n/v this shift. Up CGA. Pt denies pain. Neuro checks WNL. Fall precautions in place. Will continue to monitor.

## 2021-12-08 NOTE — CONSULTS
Neurology / Neurocritical Care Consult Note    Reason for Consult: vertigo  Admission Chief Complaint: vertigo      HPI:     Ms. Melly Miller is a 27year old woman with PMH depression, HSV, gestational diabetes (insulin dependent), who is 8 weeks post-partum and who presented with acute onset vertigo. She states her symptoms started around 2200 on  when she was burping her son. She went to put him in the bassinet and noticed that the room was spinning clockwise. She laid down until 3am on  when the baby was crying to eat. When she got out of bed, her gait was abnormal and she was crashing into furniture. She went to the bathroom to try and get into a cold shower to see if it would help. She started vomiting and called her mom. She states that during her most recent pregnancy, she was on insulin for her gestational diabetes. She also had severe asymmetric swelling on her left leg and they did scans for blood clots and those were negative. She also states that she held on to the excess fluid after childbirth for weeks. She also states she has had a headache for the past five days but she did not take anything for her headache yesterday.     PAST MEDICAL HISTORY:  Past Medical History:   Diagnosis Date    Depression     history of 4932-5489    Fx one rib-open     LEFT SIDE    Herpes simplex virus (HSV) infection     Insulin controlled gestational diabetes mellitus (GDM) in third trimester 2021    PTSD (post-traumatic stress disorder)        ALLERGIES:  Allergies   Allergen Reactions    Sulfa Antibiotics        SURGICAL HISTORY:  Past Surgical History:   Procedure Laterality Date     SECTION N/A 10/8/2021     SECTION performed by Jeremy Coffey MD at [address] [place] [address] L&D OR    MOUTH SURGERY      CYST    TONSILLECTOMY         FAMILY HISTORY:  Family history non-contributory  Family History   Problem Relation Age of Onset    Cancer Mother 39        uterine ca    Migraines Mother  High Blood Pressure Father     High Cholesterol Father    Ran Dobbs Migraines Sister     Diabetes Maternal Grandmother     Celiac Disease Maternal Grandmother     Cancer Maternal Grandfather         bladder ca with mets       SOCIAL HISTORY:  Social History     Tobacco History     Smoking Status  Current Every Day Smoker Last attempt to quit  1/22/2018 Smoking Frequency  0.5 packs/day for 6 years (3 pk yrs)    Smokeless Tobacco Use  Never Used    Tobacco Comment  down 4-5 cig a day          Alcohol History     Alcohol Use Status  Not Currently          Drug Use     Drug Use Status  Never          Sexual Activity     Sexually Active  Yes Partners  Male                HOME MEDICATIONS:  No current facility-administered medications on file prior to encounter. Current Outpatient Medications on File Prior to Encounter   Medication Sig Dispense Refill    ibuprofen (ADVIL;MOTRIN) 800 MG tablet Take 1 tablet by mouth every 8 hours as needed for Pain (Patient not taking: Reported on 10/29/2021) 30 tablet 0    docusate sodium (COLACE) 100 MG capsule Take 1 capsule by mouth 2 times daily 60 capsule 0    Prenatal Vit-Fe Fumarate-FA (PRENATAL 1+1 PO) Take by mouth daily (Patient not taking: Reported on 10/29/2021)           ROS:   Constitutional- No weight loss or fevers   Eyes- No diplopia. No photophobia. Ears/nose/throat- No dysphagia. No Dysarthria   Cardiovascular- No palpitations. No chest pain   Respiratory- No dyspnea. No Cough   Gastrointestinal- No Abdominal pain. No Vomiting. Genitourinary- No incontinence. No urinary retention   Musculoskeletal- No myalgia. No arthralgia   Skin- No rash. No easy bruising. Psychiatric- No depression. No anxiety   Endocrine- No diabetes. No thyroid issues. Hematologic- No bleeding difficulty. No fatigue   Neurologic- No weakness. + Headache.       PHYSICAL EXAM:  /67   Pulse 72   Temp 98 °F (36.7 °C) (Oral)   Resp 14   LMP 12/01/2021   SpO2 96%     General Alert, no distress, well-nourished  Cardiovascular: Warm, appears well perfused   Respiratory: Easy, non-labored respiratory pattern   Abdominal: Abdomen is without distention   Extremities: Upper and lower extremities are atraumatic in appearance without deformity. No swelling or erythema. Neurologic  Mental status:  orientation to person, place, time, situation   Attention intact as able to attend well to the exam     Language fluent in conversation   Comprehension intact; follows simple commands    Cranial nerves:   CN2: Visual fields full w/o extinction on confrontational testing   CN 3,4,6: Pupils equal and reactive to light, extraocular muscles intact  Horizontal nystagmus with right lateral gaze  CN5: Facial sensation symmetric   CN7: Face symmetric bilaterally   CN8: Hearing symmetric to spoken voice  CN9: Palate elevated symmetrically  CN11: Traps full strength on shoulder shrug  CN12: Tongue midline with protrusion    Motor Exam:  Strong and symmetric throughout     Sensory: light touch intact and symmetric in all 4 extremities. Gait: deferred         IMAGES:  Images personally reviewed and agree w/ radiology interpretation. Head CT w/o Contrast:  CT Head WO Contrast 12/07/2021    FINDINGS:  BRAIN/VENTRICLES: There is degradation of image quality related to  motion/streak artifact. The ventricular system is normal in appearance. No  evidence of mass effect or midline shift. No definite area of abnormal  attenuation of brain parenchyma is identified. No abnormal extra-axial fluid  collection is identified. ORBITS: The visualized portion of the orbits demonstrate no acute abnormality. SINUSES: The visualized paranasal sinuses and mastoid air cells demonstrate  no acute abnormality. SOFT TISSUES/SKULL:  No acute abnormality of the visualized skull or soft  tissues. There are changes of mild hyperostosis frontalis interna.     Impression  No evidence of acute intracranial abnormality on a study limited by  motion/streak artifact as described above. CTA HEAD:    ANTERIOR CIRCULATION: No significant stenosis of the intracranial internal  carotid, anterior cerebral, or middle cerebral arteries. No aneurysm. POSTERIOR CIRCULATION: No significant stenosis of the vertebral, basilar, or  posterior cerebral arteries. No aneurysm. OTHER: No dural venous sinus thrombosis on this non-dedicated study. BRAIN: No mass effect or midline shift. No extra-axial fluid collection. The  gray-white differentiation is maintained. Impression  Unremarkable CTA of the head and neck. LABS:  All results below personally reviewed. Pertinent positives & negatives are addressed in Impression & Recommendations below. Recent Labs     12/07/21  0800      K 4.5      CO2 24   BUN 11   CREATININE 0.7   GLUCOSE 147*   CALCIUM 9.5   LABALBU 4.4   ALKPHOS 91   ALT 39   AST 23     Recent Labs     12/07/21  0800   WBC 8.6   RBC 5.31*   HGB 14.1   HCT 42.8        Lab Results   Component Value Date    LABA1C 5.5 03/26/2019    LDLCALC 79 02/26/2018    TRIG 61 02/26/2018    TSH 0.64 02/26/2018     No results found for: PHENYTOIN, KEPPRA, LACOSA, LAMO, VALPROATE, LACTSEPSIS, LACTA        CURRENT SCHEDULED MEDICATIONS:   enoxaparin  40 mg SubCUTAneous Daily    aspirin  81 mg Oral Daily    Or    aspirin  300 mg Rectal Daily        ondansetron, 4 mg, Q8H PRN   Or  ondansetron, 4 mg, Q6H PRN  polyethylene glycol, 17 g, Daily PRN  meclizine, 12.5 mg, TID PRN         IMPRESSION & RECOMMENDATIONS     IMPRESSION: 27year old with PMH gestational diabetes who is 8 weeks post-partum and presented to hospital after sudden-onset, severe vertigo. Her post-partum state and her description of events concerning for ischemic stroke.   She also had a headache for 5 days prior to event so venous sinus thrombosis also in the differential.       RECOMMENDATIONS:  -MRI brain wo contrast to eval for stroke  -MRV head for cerebral venous sinus thrombosis  -Echocardiogram with bubble  -If MRI negative for stroke, PT for Epley and vestibular training  -If MRI negative for stroke, 2mg valium PO Q6H for severe vertigo    RAGHAV FELIZ - CNP   Neurology & Neurocritical Care   Neurology Line: [phone]  [employer]: [facility] Neu[facility] & Neuro Critical Care NPs  12/8/2021 11:00 AM

## 2021-12-08 NOTE — PROGRESS NOTES
RN screened patient's swallowing by administering the [facility] Protocol. The patient consumed 3 ounces of water by {Blank single:14787::\"cup\",\"straw\"} in sequential swallows {with-without:66206} signs/symptoms of aspiration.

## 2021-12-08 NOTE — CARE COORDINATION
Case Management Assessment           Initial Evaluation                Date / Time of Evaluation: 12/8/2021 10:31 AM                 Assessment Completed by: Jim Turner RN     Patient is from home with her boyfriend and children and is independent at baseline with all ADL's. She had no services or DME prior and hopes she will have no needs at d/c. Her family is able to transport her to home. She lives in a one level home with 3 steps to enter with no railing. If therapy is needed she is agreeable to home care. Patient Name: Mally Foster     YOB: 1995  Diagnosis: Vertigo [R42]     Date / Time: 12/8/2021  5:41 AM    Patient Admission Status: Inpatient    If patient is discharged prior to next notation, then this note serves as note for discharge by case management. Current PCP: No primary care provider on file.   Clinic Patient: No    Chart Reviewed: Yes  Patient/ Family Interviewed: Yes    Initial assessment completed at bedside with: patient and family member    Hospitalization in the last 30 days: No    Emergency Contacts:  Extended Emergency Contact Information  Primary Emergency Contact: Duane Mae  Address: [address] [facility] [address] [place] [address] Phone: [phone]  Relation: Parent  Secondary Emergency Contact: Nerissa Robles  Address: [address], [address]son of [address] [place] [address] Phone: [phone]  Relation: Parent    Advance Directives:   Code Status: Full [address]: No  Agent: NA  Contact Number: NA      Financial  Payor: Amadou Higgins / Plan: Karen / Product Type: *No Product type* /     Pre-cert required for SNF: Yes    Pharmacy    [facility] [address], [address]  [address] [address]  Phone: [phone] Fax: [phone]    Amadeo Signs [phone] - plan Not Indicated    Freedom of choice list was provided with basic dialogue that supports the patient's individualized plan of care/goals and shares the quality data associated with the providers.  Not Indicated    Care Transition patient: Catherine Mcginnis RN  [facility] [employer]  Case Management Department  Ph: [phone]   Fax: [phone]

## 2021-12-08 NOTE — FLOWSHEET NOTE
12/08/21 1503   Encounter Summary   Services provided to: Patient   Referral/Consult From: Rounding   Continue Visiting   (12/8, keila )   Complexity of Encounter Low   Length of Encounter 15 minutes   Routine   Type Initial   PT resting.    Staff Gloria Lott MA

## 2021-12-08 NOTE — ED NOTES
Report called to [facility]. Pt transported via EMS to [facility].      Sherly Reid RN  12/08/21 8996

## 2021-12-08 NOTE — PROGRESS NOTES
Stroke Admission    I agree as the admission nurse that I have completed a thorough stroke assessment and completed the admission on the patient. ALL assessment areas listed below have been addressed and completed. Presentation: Possible Stroke    Handoff assessment completed with ,RN. Current NIHSS 0. [x]   Education Assessment  [x]   Individualized Stroke/TIA Education template added, including patient specific risk factors:  Overweight and Diabetes  [x]   Individualized Stroke/TIA Care Plan template added  [x]   Bedside swallow screen completed using the [facility] Swallow Protocol, and documented PRIOR to any PO meds, food or drink: Pass  [x]   VTE Prophylaxis: SCDs ordered/addressed; SCDs: N/A Lovenox           (As a reminder, ASA, Plavix and TPA are not VTE prophylaxis.)  [x]   Stroke education booklet given, and education initiated with patient and/or caregiver      Nurse eSignature: Electronically signed by Tiffanie Byers RN on 12/8/21 at 8:46 AM EST

## 2021-12-08 NOTE — PROGRESS NOTES
Physical Therapy    Facility/Department: [facility] 5T ORTHO/NEURO  Initial Assessment and Treatment    NAME: Aline Lyons  : 1995  MRN: [id number]    Date of Service: 2021    Discharge Recommendations:  Aline Lyons scored a 20/24 on the AM-PAC short mobility form. Current research shows that an AM-PAC score of 18 or greater is typically associated with a discharge to the patient's home setting. Based on the patient's AM-PAC score and their current functional mobility deficits, it is recommended that the patient have 2-3 sessions per week of Physical Therapy at d/c to increase the patient's independence. At this time, this patient demonstrates the endurance and safety to discharge home. Please see assessment section for further patient specific details. PT Equipment Recommendations  Equipment Needed: No    Assessment   Assessment: Pt from home with vertigo/dizziness. Pt reports feeling better, but still symtomatic. Pt is slow and cautious with all mobiltiy. Pt needing hand held support for ambulation. Anticipate good progress as dizziness improves. Pt has good support from family at d/c. Pt may benefit from ongoing OP PT for vertigo at d/c if symptoms persist.  Will follow. Treatment Diagnosis: Decreased gait associated with vertigo. Decision Making: Medium Complexity  Patient Education: Role of PT. Pt verbalized understanding. REQUIRES PT FOLLOW UP: Yes     Restrictions  Up as tolerated     Vision/Hearing  Vision: Within Functional Limits  Hearing: Within functional limits       Subjective  Chart Reviewed: Yes  Additional Pertinent Hx: Pt to [facility] ED  for vertigo with nausea and vomiting. Pt transferred to [facility] for neurology/stroke work up. Patient is about 8 weeks postpartum. Head CT (-). CTA head (-). PMH:  gestational DM, depression, HSV, PTSD  Diagnosis: Vertigo    Subjective  Subjective: Pt found supine in bed, mom in room.   \"This is the longest I've been able to keep my eyes open. \"  Pt reports feeling intoxicated when ambulating. Pain Screening  Patient Currently in Pain: Denies    Orientation  Within Functional Limits    Social/Functional History  Lives With: Significant other (3 children- 6 years, 2 years, 2 months old)  Type of Home: House  Home Layout: One level  Home Access: Stairs to enter with rails (3)  Bathroom Shower/Tub: Tub/Shower unit  Bathroom Toilet: Standard  Bathroom Equipment:  (None)  Home Equipment:  (None)  ADL Assistance: Independent  Homemaking Assistance: Independent  Ambulation Assistance: Independent  Transfer Assistance: Independent  Active : Yes      Objective  Strength  Strength RLE: WFL  Strength LLE: WFL    Bed mobility  Supine to Sit: Modified independent (moving slowly due to dizziness)  Sit to Supine: Modified independent     Transfers  Sit to Stand: Stand by assistance  Stand to sit: Stand by assistance     Ambulation  Device: Hand-Held Assist  Assistance: Contact guard assistance  Gait Deviations: Decreased step length; Decreased step height; Slow Alondra  Distance: 10ft x2  Comments: Pt cautious and moving slowly.     Balance  Sitting - Static: Good  Sitting - Dynamic: Good  Standing - Static: Good  Standing - Dynamic: Fair    Treatment included gait and transfer training with patient education     Plan   Times per week: 2-5  Current Treatment Recommendations: Transfer Training, Gait Training, Functional Mobility Training, Strengthening    Safety Devices  Type of devices: Left in bed, Bed alarm in place, Call light within reach, Nurse notified (Mom in room)      AM-PAC Score  AM-PAC Inpatient Mobility Raw Score : 20 (12/08/21 1446)  AM-PAC Inpatient T-Scale Score : 47.67 (12/08/21 1446)  Mobility Inpatient CMS 0-100% Score: 35.83 (12/08/21 1446)  Mobility Inpatient CMS G-Code Modifier : Flako Otto (12/08/21 1446)    Goals  Short term goals  Time Frame for Short term goals: Discharge  Short term goal 1: sit <> stand independent  Short term goal 2: ambulate >100ft with supervision  Short term goal 3: ambulate up/down 3 steps with rail SBA  Patient Goals   Patient goals : Return home       Therapy Time   Individual Concurrent Group Co-treatment   Time In 1320         Time Out 1400         Minutes 40         Timed Code Treatment Minutes:  25  Total Treatment Minutes:  Bernardo Freedman PT

## 2021-12-08 NOTE — PROGRESS NOTES
Speech Language Pathology  Facility/Department: [facility] 5T ORTHO/NEURO   CLINICAL BEDSIDE SWALLOW EVALUATION/speech screen / DC    NAME: Dick Valera  : 1995  MRN: [id number]    ADMISSION DATE: 2021  ADMITTING DIAGNOSIS: has Anxiety and depression; Insomnia; Smoking; Herpes genitalia; Acquired syphilis; Insulin controlled gestational diabetes mellitus (GDM) in third trimester; History of shoulder dystocia in prior pregnancy; Family history of cancer of female genital organ; S/P primary low transverse ; and Vertigo on their problem list.  ONSET DATE: 21    Recent Chest Xray 21     No acute cardiopulmonary disease. CT of head 21  No evidence of acute intracranial abnormality on a study limited by   motion/streak artifact as described above. Date of Eval: 2021  Evaluating Therapist: ROSALINDA Coffey    Current Diet level:  Current Diet : NPO  Current Liquid Diet : NPO    Primary Complaint  Patient Complaint: denies any speech or swallowing problems    Pain:  Pain Assessment  Pain Assessment: denies    Reason for Referral  Dick Valera was referred for a bedside swallow evaluation to assess the efficiency of her swallow function, identify signs and symptoms of aspiration and make recommendations regarding safe dietary consistencies, effective compensatory strategies, and safe eating environment. HISTORY OF PRESENT ILLNESS  Per MD notes:  Kierra Mercado is a 27 y.o. female  who presents to the ED complaining of vertigo with nausea and vomiting. Patient is about 8 weeks postpartum. She states that last night around 10 PM she was semireclined burping her baby when he started having a spinning sensation. She states that it has been worsened with head movement, but has been continuous since its onset. This pain has been so severe as to make her nauseous with vomiting twice. She denies any chest pain, shortness of breath, abdominal pain, headache.   She vertigo  Hearing: Within functional limits    Oral Motor Deficits  Oral/Motor  Oral Motor: Within functional limits    Oral Phase Dysfunction  Oral Phase  Oral Phase: WFL     Indicators of Pharyngeal Phase Dysfunction   Pharyngeal Phase  Pharyngeal Phase: WFL    Prognosis  Good for swallowing/speech    Education  Patient Education: Pt educated to purpose of visit  Patient Education Response: Verbalizes understanding     Therapy Time  SLP Individual Minutes  Time In: 0818  Time Out: 8738  Minutes: 14     Plan:  Recommended diet: regular diet/thin liquids - if any s/s of aspiration emerge, or there is respiratory decline, please re-refer to SLP  Dc recommendation; no follow up indicated  Pt therapy goal: n/a  Pt dc goal: to find out why this is happening and then go home  Tarik Hernandez M.S./[facility]-SLP #[id number]  Pg.  # [phone]  Needs met prior to leaving room, call light within reach, d/w [facility]  12/8/2021 10:43 AM

## 2021-12-08 NOTE — PROGRESS NOTES
Occupational Therapy   Occupational Therapy Initial Assessment, Treatment and D/c Note   Date: 2021   Patient Name: Brianne Hairston  MRN: [id number]     : 1995    Date of Service: 2021    Discharge Recommendations:Linda Rosa scored a 23/24 on the AM-PAC ADL Inpatient form. At this time, no further OT is recommended upon discharge. Recommend patient returns to prior setting with prior services. OT Equipment Recommendations  Equipment Needed: No    Assessment   Assessment: Pt from home with SO and 3 young children. Pt demo ability to perform simple ADLs /functional mobility with setup / supervision - Pt needing increased time 2/2 to avoid feeling dizzy. No acute OT needs. No DME needs. Anticipate increase to independence as vertigo resolves. Will sign off from OT services. Prognosis: Good  Decision Making: Medium Complexity  OT Education: OT Role; Plan of Care; IADL Safety  Patient Education: verb understanding  REQUIRES OT FOLLOW UP: No  Activity Tolerance  Activity Tolerance: Treatment limited secondary to medical complications (free text); Patient Tolerated treatment well  Activity Tolerance: moving slow and cautious 2/2 vertigo. No HOLGUIN / No LOB noted -  Safety Devices  Safety Devices in place: Yes  Type of devices: Call light within reach; Bed alarm in place; Left in bed; Nurse notified (declined sitting up in chair)           Patient Diagnosis(es): There were no encounter diagnoses. has a past medical history of Depression, Fx one rib-open, Herpes simplex virus (HSV) infection, Insulin controlled gestational diabetes mellitus (GDM) in third trimester, and PTSD (post-traumatic stress disorder). has a past surgical history that includes Tonsillectomy; Mouth surgery; and  section (N/A, 10/8/2021). Restrictions  Position Activity Restriction  Other position/activity restrictions: Up as tolerated    Subjective   General  Chart Reviewed:  Yes  Additional Pertinent Hx: Admit 12/8 from [facility] with dizziness/ vertigo        Neurology consult,   MRA head/ MRI head - neg                                  PMHX:depression, HSV, gestational diabetes (insulin dependent), who is 8 weeks post-partum  Family / Caregiver Present: Yes (Mother)  Diagnosis: Dizziness Raya Silence  Subjective  Subjective: \"This is the longest I've been able to keep my eyes open\" pt found resting in bed. Pt agreeable for OOB/OT eval and tx  Patient Currently in Pain: Denies    Social/Functional History  Social/Functional History  Lives With: Significant other (3 children- 6 years, 2 years, 2 months old)  Type of Home: House  Home Layout: One level  Home Access: Stairs to enter with rails (3)  Bathroom Shower/Tub: Tub/Shower unit  Bathroom Toilet: Standard  Bathroom Equipment:  (None)  Home Equipment:  (None)  ADL Assistance: Independent  Homemaking Assistance: Independent  Ambulation Assistance: Independent  Transfer Assistance: Independent  Active : Yes       Objective  Treatment included functional transfer training, ADL's and pt. education. Orientation  Overall Orientation Status: Within Normal Limits     Balance  Sitting Balance: Independent  Standing Balance: Supervision  Standing Balance  Time: 3 mins x 2 and 4 mins x 1  Activity: functional transfers /stance at sink /functional mobility in room / bathroom  Functional Mobility  Functional - Mobility Device: Other  Activity: Other;  To/from bathroom  Assist Level: Supervision  Functional Mobility Comments: wide base of support / slow and cautious  Toilet Transfers  Toilet - Technique: Ambulating  Equipment Used: Standard toilet  Toilet Transfer: Modified independent  Toilet Transfers Comments: with rail -- has sink for leverage at home  Tub Transfers  Tub Transfers Comments: Pt edu on having family assist initially with showering for safety - verb understanding  ADL  Feeding: Setup  Grooming: Setup (stance at sink for washing hands/ face and brushing teeth)  LE Dressing: Modified independent  (pt edu on sitting for LE ADLs -- for safety- verb understanding)  Toileting: Independent  Tone RUE  RUE Tone: Normotonic  Tone LUE  LUE Tone: Normotonic  Coordination  Movements Are Fluid And Coordinated: Yes     Bed mobility  Supine to Sit: Modified independent (moving slowly due to dizziness)  Sit to Supine: Modified independent  Transfers  Sit to stand: Supervision  Stand to sit: Supervision  Vision - Basic Assessment  Prior Vision: No visual deficits  Cognition  Overall Cognitive Status: WNL    LUE AROM (degrees)  LUE AROM : WNL  Left Hand AROM (degrees)  Left Hand AROM: WNL  RUE AROM (degrees)  RUE AROM : WNL  Right Hand AROM (degrees)  Right Hand AROM: WNL  LUE Strength  Gross LUE Strength: WFL  RUE Strength  Gross RUE Strength: WFL     Hand Dominance  Hand Dominance: Right     Plan D/c Acute OT services     AM-PAC Score  AM-PAC Inpatient Daily Activity Raw Score: 23 (12/08/21 1502)  AM-PAC Inpatient ADL T-Scale Score : 51.12 (12/08/21 1502)  ADL Inpatient CMS 0-100% Score: 15.86 (12/08/21 1502)  ADL Inpatient CMS G-Code Modifier : CI (12/08/21 1502)    Therapy Time   Individual Concurrent Group Co-treatment   Time In 1320         Time Out 1400         Minutes 40            Timed Code Treatment Minutes:   23 mins     Total Treatment Minutes:  40 mins       [place], OT

## 2021-12-08 NOTE — ED NOTES
[facility] called back at with:  Bed Number: 2443-8  Report Number: [id number]  Accepting Doctor: Itzel Varner  Transport: [facility]  ETA: [address] [facility]  12/08/21 0017

## 2021-12-09 VITALS
OXYGEN SATURATION: 98 % | SYSTOLIC BLOOD PRESSURE: 115 MMHG | RESPIRATION RATE: 18 BRPM | HEART RATE: 64 BPM | DIASTOLIC BLOOD PRESSURE: 76 MMHG | TEMPERATURE: 98.6 F

## 2021-12-09 LAB
CHOLESTEROL, TOTAL: 154 MG/DL (ref 0–199)
HDLC SERPL-MCNC: 32 MG/DL (ref 40–60)
LDL CHOLESTEROL CALCULATED: 93 MG/DL
LV EF: 58 %
LVEF MODALITY: NORMAL
TRIGL SERPL-MCNC: 143 MG/DL (ref 0–150)
VLDLC SERPL CALC-MCNC: 29 MG/DL

## 2021-12-09 PROCEDURE — 96372 THER/PROPH/DIAG INJ SC/IM: CPT

## 2021-12-09 PROCEDURE — 6360000002 HC RX W HCPCS: Performed by: INTERNAL MEDICINE

## 2021-12-09 PROCEDURE — G0378 HOSPITAL OBSERVATION PER HR: HCPCS

## 2021-12-09 PROCEDURE — 93005 ELECTROCARDIOGRAM TRACING: CPT | Performed by: INTERNAL MEDICINE

## 2021-12-09 PROCEDURE — 80061 LIPID PANEL: CPT

## 2021-12-09 PROCEDURE — 83036 HEMOGLOBIN GLYCOSYLATED A1C: CPT

## 2021-12-09 PROCEDURE — C8929 TTE W OR WO FOL WCON,DOPPLER: HCPCS

## 2021-12-09 PROCEDURE — 36415 COLL VENOUS BLD VENIPUNCTURE: CPT

## 2021-12-09 PROCEDURE — 97530 THERAPEUTIC ACTIVITIES: CPT

## 2021-12-09 PROCEDURE — 97116 GAIT TRAINING THERAPY: CPT

## 2021-12-09 RX ORDER — MECLIZINE HCL 12.5 MG/1
25 TABLET ORAL 3 TIMES DAILY PRN
Qty: 30 TABLET | Refills: 0 | Status: SHIPPED | OUTPATIENT
Start: 2021-12-09 | End: 2021-12-16

## 2021-12-09 RX ADMIN — ENOXAPARIN SODIUM 40 MG: 100 INJECTION SUBCUTANEOUS at 08:39

## 2021-12-09 ASSESSMENT — PAIN SCALES - GENERAL: PAINLEVEL_OUTOF10: 0

## 2021-12-09 NOTE — DISCHARGE SUMMARY
Hospital Medicine Discharge Summary    Patient ID: Lady Mathur      Patient's PCP: No primary care provider on file. Admit Date: 12/8/2021     Discharge Date:   12/9/2021    Admitting Physician: Manuel Rodriguez MD     Discharge Physician: Hernan Padilla MD     Discharge Diagnoses: Active Hospital Problems    Diagnosis Date Noted    Vertigo [R42] 12/07/2021       The patient was seen and examined on day of discharge and this discharge summary is in conjunction with any daily progress note from day of discharge. Hospital Course: Lady Mathur 27 y.o. female 8 weeks postpartum presented to ER with a complaint of vertigo sudden onset difficulty with ambulation. Admitted with neurology consultation. MRI negative for stroke, MRV negative for venous stenosis or occlusion. Patient reported that her vertigo is a lot better. Denies any nausea, vomiting, fever, chills. Patient was noted to have bradycardia while sleeping. STOPBANG positive discussed with patient that she would benefit from sleep study as an outpatient. I gave her a prescription for meclizine as needed and she was advised to follow-up with PCP for referral for vestibular rehab. Physical Exam Performed:     /76   Pulse 64   Temp 98.6 °F (37 °C) (Oral)   Resp 18   LMP 12/01/2021   SpO2 98%       General appearance:  No apparent distress, appears stated age and cooperative. HEENT:  Normal cephalic, atraumatic without obvious deformity. Pupils equal, round, and reactive to light. Extra ocular muscles intact. Conjunctivae/corneas clear. Neck: Supple, with full range of motion. No jugular venous distention. Trachea midline. Respiratory:  Normal respiratory effort. Clear to auscultation, bilaterally without Rales/Wheezes/Rhonchi. Cardiovascular:  Regular rate and rhythm with normal S1/S2 without murmurs, rubs or gallops. Abdomen: Soft, non-tender, non-distended with normal bowel sounds.   Musculoskeletal:  No clubbing, cyanosis or edema bilaterally. Full range of motion without deformity. Skin: Skin color, texture, turgor normal.  No rashes or lesions. Neurologic:  Neurovascularly intact without any focal sensory/motor deficits. Cranial nerves: II-XII intact, grossly non-focal.  Psychiatric:  Alert and oriented, thought content appropriate, normal insight  Capillary Refill: Brisk,< 3 seconds   Peripheral Pulses: +2 palpable, equal bilaterally       Labs: For convenience and continuity at follow-up the following most recent labs are provided:      CBC:    Lab Results   Component Value Date    WBC 8.6 12/07/2021    HGB 14.1 12/07/2021    HCT 42.8 12/07/2021     12/07/2021       Renal:    Lab Results   Component Value Date     12/07/2021    K 4.5 12/07/2021     12/07/2021    CO2 24 12/07/2021    BUN 11 12/07/2021    CREATININE 0.7 12/07/2021    CALCIUM 9.5 12/07/2021         Significant Diagnostic Studies    Radiology:   MRV HEAD W WO CONTRAST   Final Result      MRI brain:   No acute intracranial abnormality. Minimal nonspecific white matter disease. This may represent sequelae of migraines or gliosis from prior insult. MRV head:   Normal MRV of the head. MRI brain without contrast   Final Result      MRI brain:   No acute intracranial abnormality. Minimal nonspecific white matter disease. This may represent sequelae of migraines or gliosis from prior insult. MRV head:   Normal MRV of the head.                    Consults:     IP CONSULT TO NEUROLOGY    Disposition: Home    Condition at Discharge: Stable    Discharge Instructions/Follow-up: PCP    Code Status:  Full Code     Activity: activity as tolerated    Diet: regular diet      Discharge Medications:     Current Discharge Medication List           Details   meclizine (ANTIVERT) 12.5 MG tablet Take 2 tablets by mouth 3 times daily as needed for Dizziness  Qty: 30 tablet, Refills: 0              Details   docusate sodium (COLACE) 100 MG capsule Take 1 capsule by mouth 2 times daily  Qty: 60 capsule, Refills: 0      Prenatal Vit-Fe Fumarate-FA (PRENATAL 1+1 PO) Take by mouth daily             Time Spent on discharge is more than 20 minutes in the examination, evaluation, counseling and review of medications and discharge plan. Signed:    Tino Robles MD   12/9/2021      Thank you No primary care provider on file. for the opportunity to be involved in this patient's care. If you have any questions or concerns please feel free to contact me at [phone].

## 2021-12-09 NOTE — PROGRESS NOTES
Physical Therapy  Daily Treatment Note    Discharge Recommendations: Misael Kramer scored a 20/24 on the AM-PAC short mobility form. Current research shows that an AM-PAC score of 18 or greater is typically associated with a discharge to the patient's home setting. Based on the patient's AM-PAC score and their current functional mobility deficits, it is recommended that the patient have 2-3 sessions per week of Physical Therapy at d/c to increase the patient's independence. At this time, this patient demonstrates the endurance and safety to discharge home with continued PT and a follow up treatment frequency of 2-3x/wk. Please see assessment section for further patient specific details. Assessment:  Pt with improved activity tolerance today. Gait slow and cautious. Not needing hand held assist for support this session. Did well on stairs with hand held--pt states her  can provide this at home. Plan is for home at D/ with assist of family. Would benefit from initial 24 hour assist, as well as continued OP PT if symptoms persist. No DME needs anticipated. Equipment Needs: None anticipated    Chart Reviewed: Yes     Other position/activity restrictions: Up as tolerated   Additional Pertinent Hx: Pt to [address] [place] [address] ED 12/7 for vertigo with nausea and vomiting. Pt transferred to [facility] for neurology/stroke work up. Patient is about 8 weeks postpartum. Head CT (-). CTA head (-). PMH:  gestational DM, depression, HSV, PTSD      Diagnosis: Vertigo   Treatment Diagnosis: Decreased gait associated with vertigo. Subjective: Pt in bed initially. Agreeable to working with PT. Not sure when she'll be D/Carlos home. States dizziness is better than when she was admitted, but still present. \"When I look at something, it looks like it's moving back and forth. \"    Pain: No c/o voiced    Objective:    Bed mobility  Supine to sit: Modified independent (HOB up partially with use of rail)    Transfers  Sit to stand: SBA from bed; SBA from chair; SBA from commode with grab bar  Stand to sit: SBA into chair (twice); SBA onto commode with grab bar    Ambulation  Assistance Level: CGA  Assistive device: None  Distance: 80 ft x 2 in chavez (seated rest between walks); 5 ft, 15 ft in room (bathroom/sink)  Quality of gait: Cautious; decreased arm swing; wide ANA CRISTINA; decreased pace    Stairs  Up/down 2-3 steps with CGA/R hand held assist (pt reports not have a railing at home.) Step-to pattern. Cautious    Patient Education  Calling for assist with needs. Expressed understanding. Safety Devices  Pt left with needs in reach. In chair with chair alarm on. RN updated. AM-PAC score  AM-PAC Inpatient Mobility Raw Score : 20  AM-PAC Inpatient T-Scale Score : 47.67  Mobility Inpatient CMS 0-100% Score: 35.83  Mobility Inpatient CMS G-Code Modifier : CJ    Goals: (as determined and assessed by primary PT)  Time Frame for Short term goals: Discharge  Short term goal 1: sit <> stand independent   Short term goal 2: ambulate >100ft with supervision   Short term goal 3: ambulate up/down 3 steps with rail SBA      Plan:  Times per week: 2-5;    Current Treatment Recommendations: Transfer Training, Gait Training, Functional Mobility Training, Strengthening    Therapy Time    Individual  Concurrent  Group  Co-treatment    Time In  1136            Time Out  1201            Minutes  25              Timed Code Treatment Minutes: 25  Total Treatment Minutes: 25    Will continue per plan of care. If patient is discharged prior to next treatment, this note will serve as the discharge summary.     [place], [place] #[id number]

## 2021-12-09 NOTE — PROGRESS NOTES
Physician Progress Note      PATIENT:               Jeremy Glynn  CSN #:                  [id number]  :                       1995  ADMIT DATE:       2021 5:41 AM  [address] [place] DATE:  RESPONDING  PROVIDER #:        Kadi Chambers MD          QUERY TEXT:    Patient admitted with BMI 42.05. If possible, please document in progress   notes and discharge summary if you are evaluating and /or treating any of the   following: The medical record reflects the following:  Risk Factors: 27 y.o. female 8 weeks postpartum, dizziness likely 2/2 BPPV  Clinical Indicators: BMI 42.05  Treatment: Monitor  Options provided:  -- Morbid obesity  -- Other - I will add my own diagnosis  -- Disagree - Not applicable / Not valid  -- Disagree - Clinically unable to determine / Unknown  -- Refer to Clinical Documentation Reviewer    PROVIDER RESPONSE TEXT:    This patient has morbid obesity.     Query created by: Jimi Mark on 2021 12:17 PM      Electronically signed by:  Kadi Chambers MD 2021 12:20 PM

## 2021-12-09 NOTE — PLAN OF CARE
Problem: Falls - Risk of:  Goal: Will remain free from falls  Description: Will remain free from falls  12/9/2021 0152 by Mingo Arroyo RN  Outcome: Ongoing  12/8/2021 1430 by Jana Lord RN  Outcome: Ongoing  Goal: Absence of physical injury  Description: Absence of physical injury  12/9/2021 0152 by Mingo Arroyo RN  Outcome: Ongoing  12/8/2021 1430 by Jana Lord RN  Outcome: Ongoing     A/O X4, VSS, O2 % on RA. RR even and unlabored Assisted to the bathroom. Call light and bedside table within reach. No complains of n/v or dizziness. PRN medication administered for vertigo. Bed lowered and bed alarm activated. Pt from home and plans to be discharged back home.

## 2021-12-09 NOTE — PROGRESS NOTES
AVS reviewed with patient with all questions answered. IV removed, cannula intact. Telemetry removed and returned to desk. Belongings packed up and at patient's bedside. Pt currently awaiting ride, will d/c via wheelchair to home.

## 2021-12-09 NOTE — CARE COORDINATION
Case Management Assessment            Discharge Note                    Date / Time of Note: 12/9/2021 2:16 PM                  Discharge Note Completed by: Andrey Cotton RN    Patient Name: Manuel Ibanez   YOB: 1995  Diagnosis: Vertigo [R42]   Date / Time: 12/8/2021  5:41 AM    Current PCP: No primary care provider on file. Clinic patient: No    Hospitalization in the last 30 days: No    Advance Directives:  Code Status: Full Code  [place] DNR form completed and on chart: Not Indicated    Financial:  Payor: Darryl Newsome / Plan: Karen / Product Type: *No Product type* /      Pharmacy:    [address] [facility], [address]  [address] [address]  Phone: [phone] Fax: [place] [address], [address] [phone] Gustabo Cash [phone]  [address] [facility]  Phone: [phone] Fax: [phone]      Assistance purchasing medications?: Potential Assistance Purchasing Medications: No  Assistance provided by Case Management: None at this time    Does patient want to participate in local refill/ meds to beds program?: Not Assessed    Meds To Beds General Rules:  1. Can ONLY be done Monday- Friday between 8:30am-5pm  2. Prescription(s) must be in pharmacy by 3pm to be filled same day  3. Copy of patient's insurance/ prescription drug card and patient face sheet must be sent along with the prescription(s)  4. Cost of Rx cannot be added to hospital bill. If financial assistance is needed, please contact unit  or ;  or  CANNOT provide pharmacy voucher for patients co-pays  5.  Patients can then  the prescription on their way out of the hospital at discharge, or pharmacy can deliver to the bedside if staff is available. (payment due at time of pick-up or delivery - cash, check, or card accepted)     Able to afford home medications/ co-pay costs: Yes    ADLS:  Current PT AM-PAC Score: 20 /24  Current OT AM-PAC Score: 23 /24      DISCHARGE Disposition: Home- No Services Needed    LOC at discharge: Not Applicable  SABINO Completed: Not Indicated    Notification completed in HENS/PAS?:  Not Applicable    IMM Completed:   Not Indicated    Transportation:  Transportation PLAN for discharge: family   Mode of Transport: Private Car      The Plan for Transition of Care is related to the following treatment goals of Vertigo [R42]    The Patient and/or patient representative Chaitanya Renee and her family were provided with a choice of provider and agrees with the discharge plan Not Indicated    Freedom of choice list was provided with basic dialogue that supports the patient's individualized plan of care/goals and shares the quality data associated with the providers.  Not Indicated    Care Transitions patient: Catherine Walsh RN  [facility] [employer]  Case Management Department  Ph: [phone]  Fax: [phone]

## 2021-12-09 NOTE — FLOWSHEET NOTE
Orthostatics below.          12/09/21 0845 12/09/21 0847 12/09/21 0849   Vital Signs   Pulse 55 56 74   BP (!) 98/54 104/70 106/72   Patient Position Supine Sitting Standing

## 2021-12-10 LAB
ESTIMATED AVERAGE GLUCOSE: 128.4 MG/DL
HBA1C MFR BLD: 6.1 %

## 2021-12-12 LAB
EKG ATRIAL RATE: 49 BPM
EKG DIAGNOSIS: NORMAL
EKG P AXIS: 25 DEGREES
EKG P-R INTERVAL: 140 MS
EKG Q-T INTERVAL: 474 MS
EKG QRS DURATION: 98 MS
EKG QTC CALCULATION (BAZETT): 428 MS
EKG R AXIS: 39 DEGREES
EKG T AXIS: 25 DEGREES
EKG VENTRICULAR RATE: 49 BPM

## 2021-12-12 PROCEDURE — 93010 ELECTROCARDIOGRAM REPORT: CPT | Performed by: INTERNAL MEDICINE

## 2021-12-14 ENCOUNTER — TELEPHONE (OUTPATIENT)
Dept: FAMILY MEDICINE CLINIC | Age: 26
End: 2021-12-14

## 2021-12-14 NOTE — TELEPHONE ENCOUNTER
----- Message from Loyda Gallego sent at 12/14/2021  9:28 AM EST -----  Subject: Appointment Request    Reason for Call: Urgent (Patient Request) Hospital Follow Up    QUESTIONS  Type of Appointment? New Patient/New to Provider  Reason for appointment request? Available appointments did not meet   patient need  Additional Information for Provider? PT. is needing to reestablish care   and needs a hospital follow up. 12/8/21-12/9/21 (32 hrs.) [place] Vertigo  ---------------------------------------------------------------------------  --------------  [employer]  What is the best way for the office to contact you? OK to leave message on   voicemail  Preferred Call Back Phone Number? [phone]  ---------------------------------------------------------------------------  --------------  SCRIPT ANSWERS  Relationship to Patient? Self  Specialty Confirmation? Primary Care  (Patient requests to see provider urgently. )? Yes  (Has the patient been discharged from the hospital within 2 business days   AND does not have a Telephone Encounter  Follow Up From [address] [place] [address]   documented in [employer]?)? No  Do you have any questions for your primary care provider that need to be   answered prior to your appointment? (Use RN Triage if question pertains to   anything on the red flag list)? No  (Patient needs follow up visit after hospital discharge) Book first   available appointment within 7 days OF DISCHARGE, if no appt, proceed to   book the next available time slot within 14 days OF DISCHARGE AND Send   Message to Provider. [address] Follow Up appointment cannot be booked   beyond 14 Days and should result in a Message to Provider. ? Yes   Have you been diagnosed with, awaiting test results for, or told that you   are suspected of having COVID-19 (Coronavirus)? (If patient has tested   negative or was tested as a requirement for work, school, or travel and   not based on symptoms, answer no)?  No  Within the past two weeks have you developed any of the following symptoms   (answer no if symptoms have been present longer than 2 weeks or began   more than 2 weeks ago)? Fever or Chills, Cough, Shortness of breath or   difficulty breathing, Loss of taste or smell, Sore throat, Nasal   congestion, Sneezing or runny nose, Fatigue or generalized body aches   (answer no if pain is specific to a body part e.g. back pain), Diarrhea,   Headache?  Yes

## 2021-12-14 NOTE — TELEPHONE ENCOUNTER
Noted. Advised to schedule with Anila Benz CNP at [facility] and NP for [facility] when available appt.

## 2021-12-16 ENCOUNTER — OFFICE VISIT (OUTPATIENT)
Dept: FAMILY MEDICINE CLINIC | Age: 26
End: 2021-12-16
Payer: COMMERCIAL

## 2021-12-16 VITALS
HEART RATE: 77 BPM | HEIGHT: 64 IN | SYSTOLIC BLOOD PRESSURE: 128 MMHG | BODY MASS INDEX: 41.42 KG/M2 | DIASTOLIC BLOOD PRESSURE: 86 MMHG | OXYGEN SATURATION: 98 % | WEIGHT: 242.6 LBS

## 2021-12-16 DIAGNOSIS — R42 DIZZINESS: Primary | ICD-10-CM

## 2021-12-16 PROCEDURE — 1111F DSCHRG MED/CURRENT MED MERGE: CPT | Performed by: NURSE PRACTITIONER

## 2021-12-16 PROCEDURE — G8427 DOCREV CUR MEDS BY ELIG CLIN: HCPCS | Performed by: NURSE PRACTITIONER

## 2021-12-16 PROCEDURE — G8484 FLU IMMUNIZE NO ADMIN: HCPCS | Performed by: NURSE PRACTITIONER

## 2021-12-16 PROCEDURE — 99213 OFFICE O/P EST LOW 20 MIN: CPT | Performed by: NURSE PRACTITIONER

## 2021-12-16 PROCEDURE — G8417 CALC BMI ABV UP PARAM F/U: HCPCS | Performed by: NURSE PRACTITIONER

## 2021-12-16 PROCEDURE — 4004F PT TOBACCO SCREEN RCVD TLK: CPT | Performed by: NURSE PRACTITIONER

## 2021-12-16 SDOH — ECONOMIC STABILITY: FOOD INSECURITY: WITHIN THE PAST 12 MONTHS, YOU WORRIED THAT YOUR FOOD WOULD RUN OUT BEFORE YOU GOT MONEY TO BUY MORE.: NEVER TRUE

## 2021-12-16 SDOH — ECONOMIC STABILITY: FOOD INSECURITY: WITHIN THE PAST 12 MONTHS, THE FOOD YOU BOUGHT JUST DIDN'T LAST AND YOU DIDN'T HAVE MONEY TO GET MORE.: NEVER TRUE

## 2021-12-16 ASSESSMENT — ENCOUNTER SYMPTOMS
RESPIRATORY NEGATIVE: 1
EYES NEGATIVE: 1
GASTROINTESTINAL NEGATIVE: 1

## 2021-12-16 ASSESSMENT — SOCIAL DETERMINANTS OF HEALTH (SDOH): HOW HARD IS IT FOR YOU TO PAY FOR THE VERY BASICS LIKE FOOD, HOUSING, MEDICAL CARE, AND HEATING?: NOT HARD AT ALL

## 2021-12-16 NOTE — PROGRESS NOTES
sounds. Abdominal:      General: Bowel sounds are normal.      Palpations: Abdomen is soft. Musculoskeletal:         General: Normal range of motion. Cervical back: Normal range of motion. Skin:     General: Skin is warm. Neurological:      General: No focal deficit present. Mental Status: She is alert and oriented to person, place, and time. Psychiatric:         Mood and Affect: Mood normal.         Behavior: Behavior normal.         Thought Content: Thought content normal.         Judgment: Judgment normal.                  An electronic signature was used to authenticate this note.     --RAGHAV Cano - CNP

## 2021-12-16 NOTE — PROGRESS NOTES
Manuel Jobs   1995, 27 y.o. female   [id number]       Referring Provider: Victorina Da Silva MD  Referral Type: In an order in [address] [place] [address]    Reason for Visit: Evaluation of the cause of disorders of balance. Ilsa Mcclelland is a 27 y.o. female seen today, 12/22/2021 , for an initial audiologic evaluation. Patient was seen by Victorina Da Silva MD following today's evaluation. AUDIOLOGIC AND OTHER PERTINENT MEDICAL HISTORY:      Manuel Jobs noted episodic dizziness described as room-spinning that started ~1.5 weeks ago and often occurs with movement such as laying down/standing up. She states dizziness has improved slightly since onset and notes Emergency Department (ED) visit for dizziness on 12/08/2021. Patient also reports family history of age-related hearing loss and history of occupational noise exposure through working in [employer] for the past 2 years. No additional significant otologic or medical history was reported. Manuel Jobs denied otalgia, aural fullness, otorrhea, tinnitus, history of falls, history of head trauma and history of ear surgery. Date: 12/22/2021     IMPRESSIONS:      Today's results revealed hearing within normal limits with excellent word recognition, bilaterally. Follow medical recommendations of Victorina Da Silva MD.     ASSESSMENT AND FINDINGS:     Otoscopy revealed: Clear ear canals bilaterally    RIGHT EAR:  Hearing Sensitivity:Hearing sensitivity within normal limits from 250-8000 Hz  Speech Recognition Threshold: 5 dB HL  Word Recognition: Excellent (96%), based on NU-6 25-word list at 50 dBHL using recorded speech stimuli. Tympanometry: Normal peak pressure and compliance, Type A tympanogram, consistent with normal middle ear function.     LEFT EAR:  Hearing Sensitivity:Hearing sensitivity within normal limits from 250-8000 Hz  Speech Recognition Threshold: 5 dB HL  Word Recognition: Excellent (100%), based on NU-6 25-word list at 50 dBHL using recorded speech stimuli. Tympanometry: Normal peak pressure and compliance, Type A tympanogram, consistent with normal middle ear function. Reliability: Good   Transducer: Inserts    See scanned audiogram dated 12/22/2021  for results. PATIENT EDUCATION:       The following items were discussed with the patient:   - Good Communication Strategies  - Noise-Induced Hearing Loss and use of Hearing Protection Devices (HPDs)   - Dizziness    Educational information was shared in the After Visit Summary. RECOMMENDATIONS:                                                                                                                                                                                                                                                            The following items are recommended based on patient report and results from today's appointment:   - Continue medical follow-up with Joshua Dawson MD.   - Retest hearing as medically indicated and/or sooner if a change in hearing is noted. - Utilize \"Good Communication Strategies\" as discussed to assist in speech understanding with communication partners. - Use hearing protection devices (HPDs), such as protective ear muffs and ear plugs, when exposed to dangerous sound levels. - If medically indicated, consider vestibular evaluation to further investigate symptoms of dizziness.        Erasmo Diego  Audiologist    Chart CC'd to: Joshua Dawson MD      Degree of   Hearing Sensitivity dB Range   Within Normal Limits (WNL) 0 - 20   Mild 20 - 40   Moderate 40 - 55   Moderately-Severe 55 - 70   Severe 70 - 90   Profound 90 +

## 2021-12-16 NOTE — LETTER
[address] [facility] [address]  Phone: [phone]  Fax: [phone]    RAGHAV Chandra CNP        December 16, 2021     Patient: Warren Lr   YOB: 1995   Date of Visit: 12/16/2021       To Whom It May Concern: It is my medical opinion that Cipriano Elena return to work duty on 1/16/2021    If you have any questions or concerns, please don't hesitate to call.     Sincerely,        RAGHAV Chandra CNP

## 2021-12-17 NOTE — PROGRESS NOTES
[facility] Ob/Gyn  Post Partum Progress Note    Subjective:   Patient is a 27 y.o. y/o  female S/P PLTCS with BS @ 38w2d  EGA. POD # 2    The pregnancy was complicated by:   Patient Active Problem List   Diagnosis    Anxiety and depression    Insomnia    Smoking    Herpes genitalia    Prenatal care in third trimester    Nausea and vomiting during pregnancy    Acquired syphilis    Insulin controlled gestational diabetes mellitus (GDM) in third trimester    History of shoulder dystocia in prior pregnancy    Family history of cancer of female genital organ    S/P primary low transverse        Doing better today. No current complaints are noted including headache, change in vision, fever, chills, chest pain, shortness of breath, nausea, vomiting, diarrhea or constipation. The patient denies dizziness with ambulation or calf tenderness -- desires more ambulation today. Voiding spontaneously. Lochia is described as moderate. The patient plans to bottle feed -- she is concerned about the tongue tie / baby may stay tonight. Objective:   GENERAL APPEARANCE: alert, well appearing, in no apparent distress  LUNGS: resp effort normal   HEART: regular rate   ABDOMEN POSTPARTUM: benign non-tender, without masses or organomegaly palpable . Uterine Fundus firm, NT, Below umbilicus. Incision Dry/Clean/Intact.     EXTREMITIES: no redness or tenderness in the calves or thighs, no edema  SKIN: normal coloration and turgor, no rashes, no suspicious skin lesions noted    Pertinent Labs:   12.8/37.2 > 10.7/31.3     Assessment / Plan:  1) POD #2    - meeting post partum milestones    - hemodynamically stable    - continue pain control     2) GMDA2   - continue BS checks today   - add sliding scale if persistently elevated     3) Male - s/p circ     4) HSV - s/p acyclovir      Disposition:   Post Partum Instructions reviewed   Anticipate discharge on POD # 2 or 3  pending clinical status     Lillian MILLAN Konstantin Reed [Initial] : an initial consultation for

## 2021-12-22 ENCOUNTER — PROCEDURE VISIT (OUTPATIENT)
Dept: AUDIOLOGY | Age: 26
End: 2021-12-22
Payer: COMMERCIAL

## 2021-12-22 ENCOUNTER — OFFICE VISIT (OUTPATIENT)
Dept: ENT CLINIC | Age: 26
End: 2021-12-22
Payer: COMMERCIAL

## 2021-12-22 VITALS
WEIGHT: 243 LBS | HEART RATE: 93 BPM | BODY MASS INDEX: 41.48 KG/M2 | TEMPERATURE: 98.1 F | DIASTOLIC BLOOD PRESSURE: 75 MMHG | HEIGHT: 64 IN | SYSTOLIC BLOOD PRESSURE: 119 MMHG

## 2021-12-22 DIAGNOSIS — R42 DIZZINESS: Primary | ICD-10-CM

## 2021-12-22 DIAGNOSIS — H81.20 VESTIBULAR NEURONITIS, UNSPECIFIED LATERALITY: Primary | ICD-10-CM

## 2021-12-22 PROCEDURE — 92567 TYMPANOMETRY: CPT | Performed by: AUDIOLOGIST

## 2021-12-22 PROCEDURE — G8484 FLU IMMUNIZE NO ADMIN: HCPCS | Performed by: OTOLARYNGOLOGY

## 2021-12-22 PROCEDURE — 4004F PT TOBACCO SCREEN RCVD TLK: CPT | Performed by: OTOLARYNGOLOGY

## 2021-12-22 PROCEDURE — 1111F DSCHRG MED/CURRENT MED MERGE: CPT | Performed by: OTOLARYNGOLOGY

## 2021-12-22 PROCEDURE — G8417 CALC BMI ABV UP PARAM F/U: HCPCS | Performed by: OTOLARYNGOLOGY

## 2021-12-22 PROCEDURE — 99203 OFFICE O/P NEW LOW 30 MIN: CPT | Performed by: OTOLARYNGOLOGY

## 2021-12-22 PROCEDURE — G8427 DOCREV CUR MEDS BY ELIG CLIN: HCPCS | Performed by: OTOLARYNGOLOGY

## 2021-12-22 PROCEDURE — 92557 COMPREHENSIVE HEARING TEST: CPT | Performed by: AUDIOLOGIST

## 2021-12-22 RX ORDER — MECLIZINE HCL 12.5 MG/1
12.5 TABLET ORAL 3 TIMES DAILY PRN
COMMUNITY

## 2021-12-22 NOTE — PATIENT INSTRUCTIONS
Good Communication Strategies    Communication can be challenging for anyone, but can be especially difficult for those with some degree of hearing loss. While we may not be able to control every factor that may lead to difficulty with communication, there are Good Communication Strategies that we can all use in our day-to-day lives. Communication takes both parties working together for it to be successful. Tips as a Listener:   1. Control your environment. It is important to limit the amount of background noise in the room when possible. You should also consider having a good light source in the room to best see the other person. 2. Ask for clarification. Instead of saying \"What?\", you can use parts of what you heard to make a new question. For example, if you heard the word \"Thursday\" but not the rest of the week, you may ask \"What was that about Thursday? \" or \"What did you want to do Thursday? \". This shows the person talking that you are listening and will help them better explain what they are saying. 3. Be an advocate for yourself. If you are hearing but not understanding, tell the other person \"I can hear you, but I need you to slow down when you speak. \"  Or if someone is facing the other direction, say \"I cannot hear you when you are not looking at me when we talk. \"       Tips as a Talker:   - Sit or stand 3 to 6 feet away to maximize audibility         -- It is unrealistic to believe someone else will fully hear your message if you are speaking from across the room or in a different room in the house   - Stay at eye level to help with visual cues   - Make sure you have the persons attention before speaking   - Use facial expressions and gestures to accentuate your message   - Raise your voice slightly (do not scream)   - Speak slowly and distinctly   - Use short, simple sentences   - Rephrase your words if the person is having a hard time understanding you    - To avoid distortion, dont speak directly into a persons ear      Some additional items that may be helpful:   - Remain patient - this is important for both parties   - Write down items that still cannot be heard/understood. You may write with pen/paper or consider typing/texting on a cell phone or smart device. - If background noise is unavoidable, try to keep yourself in a good position in the room. By sitting at a [place] on the side of the restaurant (preferably a corner), it will be easier to communicate than if you were sitting at a table in the middle with background noise surrounding you. Keep yourself positioned away from music speakers or heavy foot traffic. Noise-Induced Hearing Loss  What it is, and what you can do to prevent it      Exposure to loud sounds, in an occupational setting or recreational, can cause permanent hearing loss. Sound is measured in decibels (dB). Noise-induced hearing loss is the ONLY type of preventable hearing loss. Hearing loss related to noise exposure can occur at any age. There are small sensory cells, called inner and outer hair cells, within the inner ear (cochlea). These cells process the loudness (intensity) and pitch (frequency) of sound and send the signal to the brain via our auditory nerve (vestibulocochlear nerve, cranial nerve VIII). When these cells are damaged, they can result in permanent hearing loss and/or tinnitus. The hair cells responsible for high frequency sounds, like birds chirping, are most likely to be damaged due to loud sounds. The high frequency sounds are also very important for our clarity and understanding of speech. OCCUPATIONAL NOISE EXPOSURE RECREATIONAL NOISE EXPOSURE   Some jobs may have exposure to loud sounds in the workplace. These jobs may include but are not limited to:  Karyn Cardona [facility]   Construction   Welding   Landscaping   Hairdressing/hairstyling   Musicians  [employer]    ...  And more! Many activities outside of work may cause permanent hearing loss. These activities may include but are not limited to:  Lawnmowers, leaf blowers  [employer] (such as pigs squealing)   Chainsaws and other power tools  [employer] musical instruments and/or singing   Listening to music too loudly - at concerts, through stereo, through ear buds or headphones   Attending sporting events   Attending fireworks shows or using fireworks at home  [employer] of firearms   . .. And more! REDUCE OR PROTECT YOUR EARS FROM NOISE EXPOSURE    To do your best to avoid noise-induced hearing loss, here are some tips:   Limit exposure to loud sounds. 85 dB (decibels) is safe for 8 hours. As sounds are louder, the length of time the sound is safe lessens. These numbers are cumulative across a 24-hour period. (NIOSH and CDC, 2002)  o 85 dB is safe for 8 hours  o 88 dB is safe for 4 hours  o 91 dB is safe for 2 hours  o 94 dB is safe for 1 hour  o 97 dB is safe for 30 minutes  o 100 dB is safe for 15 minutes  o 103 dB is safe for 7.5 minutes  o 106 dB is safe for 3.75 minutes  o 109 dB is safe for LESS THAN 2 minutes  o 112 dB is safe for LESS THAN 1 minute  o 115 dB is safe for ~ 30 seconds  o 130 dB can cause IMMEDIATE hearing loss   If you are unsure if a sound is too loud, consider checking the sound level with a \"sound level meter\". There are apps on smart devices that can measure the loudness of the sound. They are not as accurate as expensive equipment used by scientists, but it will give you a guesstimate of how loud the sound is, and if it may be damaging to your hearing.  If you cannot avoid loud sounds, here are ways to reduce your exposure:  o 1. Wear hearing protection  - Ear plugs and protective ear muffs can be used to reduce the intensity of the sound. The higher the NRR (noise reduction rating), the better reduction of the intensity of the sound   o 2.  Turn the volume dizziness goes away on its own. Follow-up care is a key part of your treatment and safety. Be sure to make and go to all appointments, and call your doctor if you are having problems. It's also a good idea to know your test results and keep a list of the medicines you take. How can you care for yourself at home? · If your doctor recommends or prescribes medicine, take it exactly as directed. Call your doctor if you think you are having a problem with your medicine. · Do not drive while you feel dizzy. · Try to prevent falls. Steps you can take include:  ? Using nonskid mats, adding grab bars near the tub, and using night-lights. ? Clearing your home so that walkways are free of anything you might trip on.  ? Letting family and friends know that you have been feeling dizzy. This will help them know how to help you. When should you call for help? Call 911 anytime you think you may need emergency care. For example, call if:    · You passed out (lost consciousness). · You have dizziness along with symptoms of a heart attack. These may include:  ? Chest pain or pressure, or a strange feeling in the chest.  ? Sweating. ? Shortness of breath. ? Nausea or vomiting. ? Pain, pressure, or a strange feeling in the back, neck, jaw, or upper belly or in one or both shoulders or arms. ? Lightheadedness or sudden weakness. ? A fast or irregular heartbeat. · You have symptoms of a stroke. These may include:  ? Sudden numbness, tingling, weakness, or loss of movement in your face, arm, or leg, especially on only one side of your body. ? Sudden vision changes. ? Sudden trouble speaking. ? Sudden confusion or trouble understanding simple statements. ? Sudden problems with walking or balance. ? A sudden, severe headache that is different from past headaches. Call your doctor now or seek immediate medical care if:    · You feel dizzy and have a fever, headache, or ringing in your ears.      · You have new or increased nausea and vomiting. · Your dizziness does not go away or comes back. Watch closely for changes in your health, and be sure to contact your doctor if:    · You do not get better as expected. Where can you learn more? Go to [email or web address] and sign in to your [employer] account. Enter Z406 in the Aionex box to learn more about \"Dizziness: Care Instructions. \"     If you do not have an account, please click on the \"Sign Up Now\" link. Current as of: September 23, 2018  Content Version: 11.9  © 9406-0667 [employer]. Care instructions adapted under license by [facility] ([facility]). If you have questions about a medical condition or this instruction, always ask your healthcare professional. [address] any warranty or liability for your use of this information.

## 2021-12-22 NOTE — PROGRESS NOTES
[facility] PATIENT CONSULT NOTE      Patient Name: [facility]ds[facility] Record Number:  [id number]  Primary Care Physician:  No primary care provider on file. Chief Concern     Chief Complaint   Patient presents with    Dizziness     Started on 12/06/2021. Thought she just stood up to fast. went to ED on 12/07/2021. Feels like the room is spinning and she is moving at the smae time. Assessment and Plan      Diagnosis Orders   1. Vestibular neuronitis, unspecified laterality  Nhung Donahue Levant Sheela ESTRADA, Audiology, [facility]    MRI IAC POSTERIOR FOSSA W WO CONTRAST    CREATININE, SERUM     Is a 78-year-old female with prior history of migraines (with symptoms sounding like vestibular migraine, although has not had one in several years) with sudden onset of vertigo lasting 48 hours approximately 2.5 weeks ago. She was seen in the emergency department where an MRI and MRV were without findings, and prescribed meclizine for suspicion of vertigo. On examination today including head thrust testing and Eduard-Hallpike maneuver, we do not appreciate any nystagmus (nor any gaze paretic nystagmus). Given her report, her findings appear to be due to vestibular neuronitis. She received MRI however this did not adequately evaluate the cerebellopontine angle and cochleovestibular complex, and we have therefore ordered MRI IAC with and without contrast to rule out brainstem tumors. We have counseled her that she is likely in the habituated phase of vestibular rehabilitation, and we recommended exercises to stretch the balance of symptoms like perla chi or yoga. We have dispensed Cawthorne maneuvers to her to be performed at home, and she may follow-up on as-needed basis if her vertigo worsens or she develops hearing loss. No follow-ups on file.     History of Present Illness     Is a 78-year-old female with prior history of migraines (with symptoms sounding like vestibular migraine, although has not had one in several years) with sudden onset of vertigo lasting 48 hours approximately 2.5 weeks ago. She was seen in the emergency department where an MRI and MRV were without findings, and prescribed meclizine for suspicion of vertigo. She states that since that time her vertigo has resolved, although she occasionally has disequilibrium especially when turning quickly or going from a lying down to sitting up position.     Past Medical History     Past Medical History:   Diagnosis Date    Depression     history of [id number]    Fx one rib-open     LEFT SIDE    Herpes simplex virus (HSV) infection     Insulin controlled gestational diabetes mellitus (GDM) in third trimester 2021    PTSD (post-traumatic stress disorder)        Past Surgical History     Past Surgical History:   Procedure Laterality Date     SECTION N/A 10/8/2021     SECTION performed by Regan Hayes [facility] at [facility]    MOUTH SURGERY      CYST    TONSILLECTOMY         Family History     Family History   Problem Relation Age of Onset    Cancer Mother 39        uterine ca    Migraines Mother     High Blood Pressure Father     High Cholesterol Father    Genevieve Coral Migraines Sister     Diabetes Maternal Grandmother     Celiac Disease Maternal Grandmother     Cancer Maternal Grandfather         bladder ca with mets       Social History     Social History     Tobacco Use    Smoking status: Current Every Day Smoker     Packs/day: 0.50     Years: 6.00     Pack years: 3.00     Last attempt to quit: 2018     Years since quitting: 3.9    Smokeless tobacco: Never Used    Tobacco comment: down 4-5 cig a day   Vaping Use    Vaping Use: Never used   Substance Use Topics    Alcohol use: Not Currently    Drug use: Never        Allergies     Allergies   Allergen Reactions    Sulfa Antibiotics        Medications     Current Outpatient Medications   Medication Sig Dispense Refill  meclizine (ANTIVERT) 12.5 MG tablet Take 12.5 mg by mouth 3 times daily as needed       No current facility-administered medications for this visit.        Review of Systems     REVIEW OF SYSTEMS  The following systems were reviewed and revealed the following in addition to any already discussed in the HPI:    CONSTITUTIONAL: No weight loss, no fever, no night sweats, no chills  EYES: no vision changes, + blurry vision wears corrective lenses  EARS: no changes in hearing, no otalgia  NOSE: no epistaxis, no rhinorrhea  RESPIRATORY: No difficulty breathing, no shortness of breath  CV: no chest pain, no peripheral vascular disease  HEME: No coagulation disorder, no bleeding disorder  NEURO: No TIA or stroke-like symptoms  SKIN: No new rashes in the head and neck, no recent skin cancers  MOUTH: No new ulcers, no recent teeth infections  GASTROINTESTINAL: No diarrhea, stomach pain  PSYCH: No anxiety, no depression    PhysicalExam     Vitals:    12/22/21 1409   BP: 119/75   Site: Left Upper Arm   Position: Sitting   Cuff Size: Medium Adult   Pulse: 93   Temp: 98.1 °F (36.7 °C)   TempSrc: Infrared   Weight: 243 lb (110.2 kg)   Height: 5' 4\" (1.626 m)       PHYSICAL EXAM  /75 (Site: Left Upper Arm, Position: Sitting, Cuff Size: Medium Adult)   Pulse 93   Temp 98.1 °F (36.7 °C) (Infrared)   Ht 5' 4\" (1.626 m)   Wt 243 lb (110.2 kg)   LMP 12/01/2021   BMI 41.71 kg/m²     GENERAL: No Acute Distress, Alert and Oriented, no hoarseness  EYES: EOMI, Anti-icteric  NOSE: On anterior rhinoscopy there is no epistaxis, nasal mucosa within normal limits, no purulent drainage  EARS: Normal external appearance; on portable otomicroscopy:  -Right ear: External auditory canal without stenosis, tympanic membrane clear, no middle ear effusions or retractions  -Left ear: External auditory canal without stenosis, tympanic membrane clear, no middle ear effusions or retractions  -Tuning fork testing: With a 512-Hz tuning fork the Ivan is no midline, The Rinne on the right ear the is air>bone conduction, on the left ear air>bone conduction  -No nystagmus/vertigo with pneumatic otoscopy bilaterally  FACE: 1/6 House-Brackmann Scale, symmetric, sensation equal bilaterally  ORAL CAVITY: No masses or lesions palpated, uvula is midline, moist mucous membranes, 0+ tonsils, good dentition  NECK: Normal range of motion, no thyromegaly, trachea is midline, no lymphadenopathy, no neck masses, no crepitus  CHEST: Normal respiratory effort, no retractions, breathing comfortably  SKIN: No rashes, normal appearing skin, no evidence of skin lesions/tumors  NEURO: CN 2, 3, 4, 5, 6, 7, 11, 12 intact bilaterally  -Elgin-Hallpike testing (posterior semicircular canal testing): No nystagmus or vertigo  -Supine head roll (lateral semicircular canal testing): Diagnose vertigo  -Head impulse testing (VOR function): No corrective saccade bilaterally  -Nystagmus testing (primary, central, left gaze): No gaze paretic nystagmus    Data Review            Procedure     None    Gurjit Lieberman MD  [facility]  Department of Otolaryngology/Head and Neck Surgery  12/22/21    I have performed a head and neck physical exam personally or was physically present during the key or critical portions of the service. This note was generated completely or in part utilizing Dragon dictation speech recognition software. Occasionally, words are mistranscribed and despite editing, the text may contain inaccuracies due to incorrect word recognition. If further clarification is needed please contact the office at [phone]. An electronic signature was used to authenticate this note.

## 2021-12-22 NOTE — PATIENT INSTRUCTIONS
Instructions for Benjamin's Head Exercises    Exercises to be carried out for 15 minutes, 2 x's daily. Increasing to 30 minutes twice daily. Eye exercises:  · Looking up, then down- at first slowly, then quickly-20 times. · Looking from one side to the other -at first slowly, then quickly-20 times. · Focus on finger at arm's length, moving finger one foot closer and back again-20 times. Head exercises:  · Bend head forward then backwards with eyes open-slowly, later quickly-20 times. · Turn head from side to side-slowly, then quickly-20 times. · As dizziness decreases, these exercises should be done with eyes closed. Sitting:  · While sitting, shrug shoulders-20 times. · Turn shoulders to right, then to left-20 times. · Bend forward and  object from ground and sit up-20 times. Standing:  · Change from sitting to standing and back again-20 times with eyes open. Repeat with eyes closed. · Throw a rubber ball from hand to hand above eye level. · Throw ball from hand to hand under one knee. Moving About:  · Walk across room with eyes open, then closed-10 times. · Walk up and down a slope with eyes open, then closed-10 times. · Walk up and down steps with eyes open, then closed-10 times. · Any activities involving stooping or turning is good.

## 2021-12-22 NOTE — LETTER
[id number] [facility]  [address] [place] [address] Jennifer Gagnon  Phone: [phone]  Fax: [phone]           Jayden Alvarez MD      December 22, 2021     Patient: Cass Hale   MR Number: [id number]   YOB: 1995   Date of Visit: 12/22/2021       Dear Dr. Carcamo Res: Thank you for referring Shanti Hamlin to me for evaluation/treatment. I believe that given her symptoms, she had an episode of vestibular neuronitis. Her balance is improving, and I have given her exercises to perform at home. She received an MRI while in the emergency department, but this did not adequately evaluate the brainstem or nerve of hearing or balance, and I therefore reordered an MRI with these protocols to rule out any brainstem masses and will call her back with results of imaging. We will see her back on an as-needed basis if her vertigo worsens or she develops hearing loss. If you have questions, please do not hesitate to call me. I look forward to following Linda along with you.     Sincerely,        Jayden Alvarez MD    CC providers:    No Recipients

## 2021-12-22 NOTE — Clinical Note
Dr. Jon Mullins,    Thank you for your referral for audiometric testing on this patient. Please see the scanned audiogram (under \"Media\" tab) and encounter note for details. If you have any questions, or if there is anything else you need, please let me know.       Brandie Rodriguez  Audiologist  ---  [facility] - [facility] - Audiology

## 2022-01-03 ENCOUNTER — NURSE ONLY (OUTPATIENT)
Dept: FAMILY MEDICINE CLINIC | Age: 27
End: 2022-01-03

## 2022-01-03 ENCOUNTER — TELEMEDICINE (OUTPATIENT)
Dept: PRIMARY CARE CLINIC | Age: 27
End: 2022-01-03
Payer: COMMERCIAL

## 2022-01-03 DIAGNOSIS — R43.2 LOSS OF TASTE: Primary | ICD-10-CM

## 2022-01-03 DIAGNOSIS — R51.9 HEADACHE DISORDER: ICD-10-CM

## 2022-01-03 PROCEDURE — 1111F DSCHRG MED/CURRENT MED MERGE: CPT | Performed by: NURSE PRACTITIONER

## 2022-01-03 PROCEDURE — 99212 OFFICE O/P EST SF 10 MIN: CPT | Performed by: NURSE PRACTITIONER

## 2022-01-03 PROCEDURE — G8427 DOCREV CUR MEDS BY ELIG CLIN: HCPCS | Performed by: NURSE PRACTITIONER

## 2022-01-03 NOTE — LETTER
And         [address] [place] [address]  Phone: [phone]  Fax: [phone]    RAGHAV Figueroa - BETTIE        January 3, 2022     Patient: Roby Lott   YOB: 1995   Date of Visit: 1/3/2022       To Whom it May Concern:    Sarmad Crocker was seen in my clinic on 1/3/2022. She may return to work on January 10, 2022. If you have any questions or concerns, please don't hesitate to call.     Sincerely,         RAGHAV Figueroa CNP

## 2022-01-03 NOTE — PROGRESS NOTES
Daniel Navarro (:  1995) is a 27 y.o. female,Established patient, here for evaluation of the following chief complaint(s): Headache and Other (loss of taste)         ASSESSMENT/PLAN:  1. Loss of taste  Problem:  COVID-19 test ordered earlier  Quarantine until test results come back  REST  Push fluids    2. Headache disorder  Problem:  Take tylenol and alternate with IBU as the bottle directs   Rest  Push fluids    Has a f/u in office on Thursday  Keep appointment if COVID-19 negative  If positive will need to re-schedule  Verbalized understanding of these instructions  Note given for work to return January 10, 2022- This is if positive for COVID       SUBJECTIVE/OBJECTIVE:  This is a 27year old female patient of RAGHAV Rizo consenting to a VV today  She was tested earlier today for COVID-19 d/t loss of taste and headache  Symptoms started yesterday but she states her daughter is home sick with similar symptoms. She has not treated her symptoms with anything OTC. Instructed to take Tylenol alternating with IBU for the headaches. Verbalized understanding. She was suppose to go back to work today but did not and needs a note      Review of Systems   Gastrointestinal:        Loss of sense of taste   Neurological: Positive for headaches. All other systems reviewed and are negative. Physical Exam  Nursing note reviewed. HENT:      Head: Normocephalic. Nose: Nose normal.   Eyes:      Conjunctiva/sclera: Conjunctivae normal.   Pulmonary:      Effort: Pulmonary effort is normal.   Musculoskeletal:         General: Normal range of motion. Cervical back: Normal range of motion. Skin:     General: Skin is dry. Neurological:      Mental Status: She is alert and oriented to person, place, and time. Psychiatric:         Mood and Affect: Mood normal.         Behavior: Behavior normal.         Thought Content:  Thought content normal.         Judgment: Judgment normal.             On this date 1/3/2022 I have spent 15 minutes reviewing previous notes, test results and face to face (virtual) with the patient discussing the diagnosis and importance of compliance with the treatment plan as well as documenting on the day of the visit. Jr Valdivia was evaluated through a synchronous (real-time) audio-video encounter. The patient (or guardian if applicable) is aware that this is a billable service. Verbal consent to proceed has been obtained within the past 12 months. The visit was conducted pursuant to the emergency declaration under the [address] [place] [address], [address] [place] [address] authority and the [employer] General Act. Patient identification was verified, and a caregiver was present when appropriate. The patient was located in a state where the provider was credentialed to provide care. An electronic signature was used to authenticate this note.     --Sindhu Adams, APRJAXSON - CNP

## 2022-01-04 LAB — SARS-COV-2: DETECTED

## 2022-01-04 NOTE — PROGRESS NOTES
I think or know I had COVID-19, and I had symptoms  You can be around others after:  10 days since symptoms first appeared and  24 hours with no fever without the use of fever-reducing medications and  Other symptoms of COVID-19 are improving*  *Loss of taste and smell may persist for weeks or months after recovery and need not delay the end of isolation  Note that these recommendations do not apply to people with severe COVID-19 or with weakened immune systems (immunocompromised).

## 2022-01-06 ENCOUNTER — VIRTUAL VISIT (OUTPATIENT)
Dept: FAMILY MEDICINE CLINIC | Age: 27
End: 2022-01-06
Payer: COMMERCIAL

## 2022-01-06 DIAGNOSIS — Z76.89 ENCOUNTER TO ESTABLISH CARE: Primary | ICD-10-CM

## 2022-01-06 DIAGNOSIS — Z00.00 HEALTHCARE MAINTENANCE: ICD-10-CM

## 2022-01-06 PROCEDURE — 99395 PREV VISIT EST AGE 18-39: CPT | Performed by: NURSE PRACTITIONER

## 2022-01-06 ASSESSMENT — PATIENT HEALTH QUESTIONNAIRE - PHQ9
SUM OF ALL RESPONSES TO PHQ QUESTIONS 1-9: 0
2. FEELING DOWN, DEPRESSED OR HOPELESS: 0
1. LITTLE INTEREST OR PLEASURE IN DOING THINGS: 0
SUM OF ALL RESPONSES TO PHQ QUESTIONS 1-9: 0
SUM OF ALL RESPONSES TO PHQ9 QUESTIONS 1 & 2: 0
SUM OF ALL RESPONSES TO PHQ QUESTIONS 1-9: 0
SUM OF ALL RESPONSES TO PHQ QUESTIONS 1-9: 0

## 2022-01-06 ASSESSMENT — ENCOUNTER SYMPTOMS
GASTROINTESTINAL NEGATIVE: 1
EYES NEGATIVE: 1
RESPIRATORY NEGATIVE: 1

## 2022-01-06 NOTE — PROGRESS NOTES
2022    TELEHEALTH EVALUATION -- Audio/Visual (During FJOHN-[id number] public health emergency)    HPI:    Ravin Juan (:  1995) has requested an audio/video evaluation for the following concern(s):    Scheduled VV today to establish care as a new pt. Denies any complaints. Previous pt of BETTIE Grullon. Been in a relationship with her boyfriend for 10 years, they have 3 children together. 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 Denies alcohol or drug use. Smokes 1/2 ppd. Had her 3rd child 3 months ago, she is not breastfeeding. UTD on routine pap smears - Follows Dr. Breanne Diaz. Denies family h/o colon cancer, breast cancer or heart disease. Takes meclizine as needed for vertigo. Was diagnosed with COVID-19 3 days ago. She states that all of her symptoms have resolved, but she still can't taste anything. Labs from 21 rev'd. A1C was 6.1. Pt states that she had gestational diabetes when pregnant - has a f/u appointment with her OB/Gyn for fasting labs. Review of Systems   Constitutional: Negative. HENT: Negative. Eyes: Negative. Respiratory: Negative. Cardiovascular: Negative. Gastrointestinal: Negative. Genitourinary: Negative. Musculoskeletal: Negative. Skin: Negative. Neurological: Negative. Psychiatric/Behavioral: Negative. Prior to Visit Medications    Medication Sig Taking?  Authorizing Provider   meclizine (ANTIVERT) 12.5 MG tablet Take 12.5 mg by mouth 3 times daily as needed Yes Historical Provider, MD       Social History     Tobacco Use    Smoking status: Current Every Day Smoker     Packs/day: 0.50     Years: 6.00     Pack years: 3.00     Last attempt to quit: 2018     Years since quitting: 3.9    Smokeless tobacco: Never Used    Tobacco comment: down 4-5 cig a day   Vaping Use    Vaping Use: Never used   Substance Use Topics    Alcohol use: Not Currently    Drug use: Never Allergies   Allergen Reactions    Sulfa Antibiotics    ,   Past Medical History:   Diagnosis Date    Depression     history of 1191-7714    Fx one rib-open     LEFT SIDE    Herpes simplex virus (HSV) infection     Insulin controlled gestational diabetes mellitus (GDM) in third trimester 2021    PTSD (post-traumatic stress disorder)    ,   Past Surgical History:   Procedure Laterality Date     SECTION N/A 10/8/2021     SECTION performed by Kat [facility]en [facility]    MOUTH SURGERY      CYST    TONSILLECTOMY     ,   Social History     Tobacco Use    Smoking status: Current Every Day Smoker     Packs/day: 0.50     Years: 6.00     Pack years: 3.00     Last attempt to quit: 2018     Years since quitting: 3.9    Smokeless tobacco: Never Used    Tobacco comment: down 4-5 cig a day   Vaping Use    Vaping Use: Never used   Substance Use Topics    Alcohol use: Not Currently    Drug use: Never   ,   Family History   Problem Relation Age of Onset    Cancer Mother 39        uterine ca    Migraines Mother     High Blood Pressure Father     High Cholesterol Father     Migraines Sister     Diabetes Maternal Grandmother     Celiac Disease Maternal Grandmother     Cancer Maternal Grandfather         bladder ca with mets   ,   Immunization History   Administered Date(s) Administered    DTaP 1993, 1993, 1993, 1994, 2008    HIB PRP-T (ActHIB, Hiberix) 1993, 1993, 1993, 1994    HPV Quadrivalent (Gardasil) 2007, 2007, 2008    Influenza, Daiva Polanco, IM, PF (6 mo and older Fluzone, Flulaval, Fluarix, and 3 yrs and older Afluria) 10/17/2016, 2017    MMR 1994, 2005    Polio IPV (IPOL) 1993, 1993, 1994, 1998    Tdap (Boostrix, Adacel) 2006, 2014    Varicella (Varivax) 1998, 2010   ,   Health Maintenance   Topic Date Due    Hepatitis C screen Never done    COVID-19 Vaccine (1) Never done    Pneumococcal 0-64 years Vaccine (1 of 2 - PPSV23) Never done    Depression Monitoring  Never done    Hepatitis A vaccine (2 of 2 - 2-dose series) 01/08/2011    Flu vaccine (1) 09/01/2021    A1C test (Diabetic or Prediabetic)  12/09/2022    Pap smear  03/04/2024    DTaP/Tdap/Td vaccine (9 - Td or Tdap) 07/31/2029    Hib vaccine  Completed    Varicella vaccine  Completed    HIV screen  Completed    Hepatitis B vaccine  Aged Out    Meningococcal (ACWY) vaccine  Aged Out       PHYSICAL EXAMINATION:  [ INSTRUCTIONS:  \"[x]\" Indicates a positive item  \"[]\" Indicates a negative item  -- DELETE ALL ITEMS NOT EXAMINED]  Vital Signs: (As obtained by patient/caregiver or practitioner observation)    Blood pressure-  Heart rate-    Respiratory rate-    Temperature-  Pulse oximetry-     Constitutional: [x] Appears well-developed and well-nourished [x] No apparent distress      [] Abnormal-   Mental status  [x] Alert and awake  [x] Oriented to person/place/time [x]Able to follow commands      Eyes:  EOM    [x]  Normal  [] Abnormal-  Sclera  [x]  Normal  [] Abnormal -         Discharge [x]  None visible  [] Abnormal -    HENT:   [x] Normocephalic, atraumatic.   [] Abnormal   [x] Mouth/Throat: Mucous membranes are moist.     External Ears [] Normal  [] Abnormal-     Neck: [] No visualized mass     Pulmonary/Chest: [x] Respiratory effort normal.  [x] No visualized signs of difficulty breathing or respiratory distress        [] Abnormal-      Musculoskeletal:   [] Normal gait with no signs of ataxia         [x] Normal range of motion of neck        [] Abnormal-       Neurological:        [x] No Facial Asymmetry (Cranial nerve 7 motor function) (limited exam to video visit)          [] No gaze palsy        [] Abnormal-         Skin:        [x] No significant exanthematous lesions or discoloration noted on facial skin         [] Abnormal-            Psychiatric:       [x] Normal Affect [x] No Hallucinations        [] Abnormal-     Other pertinent observable physical exam findings-     ASSESSMENT/PLAN:  1. Encounter to establish care  Previous pt of BETTIE Grullon. Pt had to switch to a VV b/c she was recently diagnosed with COVID-19.      2. Healthcare maintenance  Encouraged healthy diet and exercise. Recommended routine dental and vision exams. Encouraged smoking cessation. Recent labs rev'd. Return in about 1 year (around 1/6/2023) for Annual Physical.    Jr Valdivia, was evaluated through a synchronous (real-time) audio-video encounter. The patient (or guardian if applicable) is aware that this is a billable service. Verbal consent to proceed has been obtained within the past 12 months. The visit was conducted pursuant to the emergency declaration under the [address] [place] [address], [address] [place] [address] authority and the [place] and [employer] General Act. Patient identification was verified, and a caregiver was present when appropriate. The patient was located in a state where the provider was credentialed to provide care. Total time spent on this encounter: 20 minutes    --RAGHAV Boyce CNP on 1/6/2022 at 11:58 AM    An electronic signature was used to authenticate this note.

## 2022-01-19 ENCOUNTER — HOSPITAL ENCOUNTER (OUTPATIENT)
Dept: MRI IMAGING | Age: 27
Discharge: HOME | End: 2022-01-19
Payer: COMMERCIAL

## 2022-01-19 DIAGNOSIS — H81.20 VESTIBULAR NEURONITIS, UNSPECIFIED LATERALITY: ICD-10-CM

## 2022-01-19 PROCEDURE — A9579 GAD-BASE MR CONTRAST NOS,1ML: HCPCS | Performed by: OTOLARYNGOLOGY

## 2022-01-19 PROCEDURE — 70553 MRI BRAIN STEM W/O & W/DYE: CPT

## 2022-01-19 PROCEDURE — 6360000004 HC RX CONTRAST MEDICATION: Performed by: OTOLARYNGOLOGY

## 2022-01-19 RX ADMIN — GADOTERIDOL 20 ML: 279.3 INJECTION, SOLUTION INTRAVENOUS at 16:17

## 2022-01-20 ENCOUNTER — TELEPHONE (OUTPATIENT)
Dept: OTOLARYNGOLOGY | Age: 27
End: 2022-01-20

## 2022-01-20 ENCOUNTER — NURSE ONLY (OUTPATIENT)
Dept: OBGYN CLINIC | Age: 27
End: 2022-01-20
Payer: COMMERCIAL

## 2022-01-20 DIAGNOSIS — O24.414 INSULIN CONTROLLED GESTATIONAL DIABETES MELLITUS (GDM) IN THIRD TRIMESTER: Primary | ICD-10-CM

## 2022-01-20 PROCEDURE — 36415 COLL VENOUS BLD VENIPUNCTURE: CPT | Performed by: OBSTETRICS & GYNECOLOGY

## 2022-01-20 NOTE — TELEPHONE ENCOUNTER
Reached patient - communicated results of MRI - no CPA lesions noted.  She states resolution of vertigo; no hearing loss noted on audiogram, may follow up with our office PRN

## 2022-01-20 NOTE — PROGRESS NOTES
.eastobgynglucose    Patient started drink at 950a   and finished at  950a  . Patient tolerated drink well.   1 green tube drawn at [place] [address] . # 75 grams of Glucola. No complain of nausea and vomiting at this time, will continue to monitor. Blood draw from  Right hand on first attempt , 1 green tubes drawn. Patient tolerated well.  Raj Dan MA

## 2022-01-21 LAB
GLUCOSE FASTING: 91 MG/DL (ref 0–99)
GLUCOSE TOLERANCE TEST 1 HOUR: 195 MG/DL
GLUCOSE TOLERANCE TEST 2 HOUR: 185 MG/DL

## 2022-09-09 ENCOUNTER — OFFICE VISIT (OUTPATIENT)
Dept: FAMILY MEDICINE CLINIC | Age: 27
End: 2022-09-09
Payer: COMMERCIAL

## 2022-09-09 VITALS
HEART RATE: 100 BPM | OXYGEN SATURATION: 97 % | BODY MASS INDEX: 42.91 KG/M2 | SYSTOLIC BLOOD PRESSURE: 102 MMHG | WEIGHT: 250 LBS | DIASTOLIC BLOOD PRESSURE: 74 MMHG

## 2022-09-09 DIAGNOSIS — M54.41 ACUTE MIDLINE LOW BACK PAIN WITH RIGHT-SIDED SCIATICA: Primary | ICD-10-CM

## 2022-09-09 PROCEDURE — G8427 DOCREV CUR MEDS BY ELIG CLIN: HCPCS | Performed by: NURSE PRACTITIONER

## 2022-09-09 PROCEDURE — 4004F PT TOBACCO SCREEN RCVD TLK: CPT | Performed by: NURSE PRACTITIONER

## 2022-09-09 PROCEDURE — G8417 CALC BMI ABV UP PARAM F/U: HCPCS | Performed by: NURSE PRACTITIONER

## 2022-09-09 PROCEDURE — 99213 OFFICE O/P EST LOW 20 MIN: CPT | Performed by: NURSE PRACTITIONER

## 2022-09-09 RX ORDER — METHYLPREDNISOLONE 4 MG/1
TABLET ORAL
Qty: 1 KIT | Refills: 0 | Status: SHIPPED | OUTPATIENT
Start: 2022-09-09 | End: 2022-09-15

## 2022-09-09 RX ORDER — CYCLOBENZAPRINE HCL 5 MG
5 TABLET ORAL 3 TIMES DAILY PRN
Qty: 30 TABLET | Refills: 0 | Status: SHIPPED | OUTPATIENT
Start: 2022-09-09 | End: 2022-09-19

## 2022-09-09 ASSESSMENT — ENCOUNTER SYMPTOMS
GASTROINTESTINAL NEGATIVE: 1
BACK PAIN: 1

## 2022-09-09 NOTE — PROGRESS NOTES
2022     Pierre Diaz (:  1995) is a 32 y.o. female, here for evaluation of the following medical concerns:    HPI  Pt c/o mid lower back pain for the past few weeks. No known injury. States that the pain sometimes goes down her right leg. Has tried \"popping it\", stretches and tylenol without any relief. States that her lower back \"feels stiff\". Review of Systems   Gastrointestinal: Negative. Genitourinary: Negative. Musculoskeletal:  Positive for back pain. Prior to Visit Medications    Medication Sig Taking? Authorizing Provider   methylPREDNISolone (MEDROL, GALLO,) 4 MG tablet As directed Yes RAGHAV Quesada CNP   cyclobenzaprine (FLEXERIL) 5 MG tablet Take 1 tablet by mouth 3 times daily as needed for Muscle spasms Yes RAGHAV Quesada CNP   meclizine (ANTIVERT) 12.5 MG tablet Take 12.5 mg by mouth 3 times daily as needed Yes Historical Provider, MD        Social History     Tobacco Use    Smoking status: Every Day     Packs/day: 0.50     Years: 6.00     Pack years: 3.00     Types: Cigarettes     Last attempt to quit: 2018     Years since quittin.6    Smokeless tobacco: Never    Tobacco comments:     down 4-5 cig a day   Substance Use Topics    Alcohol use: Not Currently        Vitals:    22 1105   BP: 102/74   Site: Right Upper Arm   Position: Sitting   Cuff Size: Large Adult   Pulse: 100   SpO2: 97%   Weight: 250 lb (113.4 kg)     Estimated body mass index is 42.91 kg/m² as calculated from the following:    Height as of 21: 5' 4\" (1.626 m). Weight as of this encounter: 250 lb (113.4 kg). Physical Exam  Vitals reviewed. Constitutional:       Appearance: Normal appearance. Pulmonary:      Effort: Pulmonary effort is normal.   Musculoskeletal:      Cervical back: Normal and normal range of motion. Thoracic back: Normal.      Lumbar back: Spasms and tenderness present.  No swelling, edema, deformity, signs of trauma or lacerations. Decreased range of motion. Skin:     General: Skin is warm and dry. Neurological:      Mental Status: She is alert and oriented to person, place, and time. Psychiatric:         Mood and Affect: Mood normal.         Behavior: Behavior normal.         Thought Content: Thought content normal.         Judgment: Judgment normal.       ASSESSMENT/PLAN:  1. Acute midline low back pain with right-sided sciatica  Verbal and written instructions provided to pt including applying heat and stretches to try at home. Take medrol dose pack as directed with food to prevent GI upset and Flexeril as directed for muscle spasms - may cause drowsiness. F/u with me if symptoms persist or worsen. May need imaging and/or referral to ortho. - methylPREDNISolone (MEDROL, GALLO,) 4 MG tablet; As directed  Dispense: 1 kit; Refill: 0  - cyclobenzaprine (FLEXERIL) 5 MG tablet; Take 1 tablet by mouth 3 times daily as needed for Muscle spasms  Dispense: 30 tablet; Refill: 0    Return if symptoms worsen or fail to improve. An electronic signature was used to authenticate this note.     --RAGHAV Rodriges - CNP on 9/9/2022 at 11:22 AM

## 2022-09-26 DIAGNOSIS — M54.41 ACUTE MIDLINE LOW BACK PAIN WITH RIGHT-SIDED SCIATICA: Primary | ICD-10-CM

## 2022-10-03 ENCOUNTER — OFFICE VISIT (OUTPATIENT)
Dept: ORTHOPEDIC SURGERY | Age: 27
End: 2022-10-03
Payer: COMMERCIAL

## 2022-10-03 VITALS — BODY MASS INDEX: 42.68 KG/M2 | HEIGHT: 64 IN | WEIGHT: 250 LBS

## 2022-10-03 DIAGNOSIS — M54.50 LUMBAR PAIN: ICD-10-CM

## 2022-10-03 DIAGNOSIS — M47.816 LUMBAR SPONDYLOSIS: Primary | ICD-10-CM

## 2022-10-03 PROCEDURE — 4004F PT TOBACCO SCREEN RCVD TLK: CPT | Performed by: PHYSICIAN ASSISTANT

## 2022-10-03 PROCEDURE — 99204 OFFICE O/P NEW MOD 45 MIN: CPT | Performed by: PHYSICIAN ASSISTANT

## 2022-10-03 PROCEDURE — G8427 DOCREV CUR MEDS BY ELIG CLIN: HCPCS | Performed by: PHYSICIAN ASSISTANT

## 2022-10-03 PROCEDURE — G8417 CALC BMI ABV UP PARAM F/U: HCPCS | Performed by: PHYSICIAN ASSISTANT

## 2022-10-03 PROCEDURE — G8484 FLU IMMUNIZE NO ADMIN: HCPCS | Performed by: PHYSICIAN ASSISTANT

## 2022-10-03 NOTE — PROGRESS NOTES
New Patient: LUMBAR SPINE    Referring Provider:  RAGHAV Luna -*    CHIEF COMPLAINT:    Chief Complaint   Patient presents with    Back Pain     lumbar       HISTORY OF PRESENT ILLNESS:       Ms. Joseph Cardozo  is a pleasant 32 y.o. female referred by her primary care provider Silvino Holloway CNP, here for consultation regarding her LBP. She states her pain began insidiously about months ago. Her pain has steadily increased since then. She rates her back pain 8/10, buttock pain 6/10 with no radiation of pain into either lower extremity. Patient states that the pain is worse with prolonged sitting, rising from a seated position, leaning forward, and walking and improved with standing, changing positions, and lying down. Denies any numbness or tingling into either lower extremity and denies any progressive weakness into either lower extremity. She denies any bowel or bladder dysfunction or saddle anesthesia. She has had a recent steroid taper and muscle relaxant from her primary care physician which she states gave her minimal benefit.   Pain Assessment  Location of Pain: Back  Severity of Pain: 8  Quality of Pain: Other (Comment)  Duration of Pain: Other (Comment)  Frequency of Pain: Other (Comment)]  Current/Past Treatment:   Physical Therapy: None  Chiropractic: None  Injection: None  Medications: None    Past Medical History:   Past Medical History:   Diagnosis Date    Depression     history of 4028-3571    Fx one rib-open     LEFT SIDE    Herpes simplex virus (HSV) infection     Insulin controlled gestational diabetes mellitus (GDM) in third trimester 2021    PTSD (post-traumatic stress disorder)         Past Surgical History:     Past Surgical History:   Procedure Laterality Date     SECTION N/A 10/8/2021     SECTION performed by Kellie Reyes MD at [facility] L[facility] OR    MOUTH SURGERY      CYST    TONSILLECTOMY         Current Medications:     Current Outpatient Medications: meclizine (ANTIVERT) 12.5 MG tablet, Take 12.5 mg by mouth 3 times daily as needed, Disp: , Rfl:     Allergies:  Sulfa antibiotics    Social History:    reports that she has been smoking. She has a 3.00 pack-year smoking history. She has never used smokeless tobacco. She reports that she does not currently use alcohol. She reports that she does not use drugs. Family History:   Family History   Problem Relation Age of Onset    Cancer Mother 39        uterine ca    Migraines Mother     High Blood Pressure Father     High Cholesterol Father     Migraines Sister     Diabetes Maternal Grandmother     Celiac Disease Maternal Grandmother     Cancer Maternal Grandfather         bladder ca with mets       REVIEW OF SYSTEMS: Full ROS noted & scanned   CONSTITUTIONAL: Denies unexplained weight loss, fevers, chills or fatigue  NEUROLOGICAL: Denies unsteady gait or progressive weakness  MUSCULOSKELETAL: Denies joint swelling or redness  PSYCHOLOGICAL: Patient has a history of anxiety and depression  SKIN: Denies skin changes, delayed healing, rash, itching   HEMATOLOGIC: Denies easy bleeding or bruising  ENDOCRINE: Denies excessive thirst, urination, heat/cold  RESPIRATORY: Denies current dyspnea, cough  GI: Denies nausea, vomiting, diarrhea   : Denies bowel or bladder issues      PHYSICAL EXAM:    Vitals: Height 5' 4.02\" (1.626 m), weight 250 lb (113.4 kg), unknown if currently breastfeeding. GENERAL EXAM:  General Apparence: Patient is adequately groomed with no evidence of malnutrition. Orientation: The patient is oriented to time, place and person. Mood & Affect:The patient's mood and affect are appropriate. Vascular: Examination reveals no swelling tenderness in upper or lower extremities. Good capillary refill. Lymphatic: The lymphatic examination bilaterally reveals all areas to be without enlargement or induration  Sensation: Sensation is intact without deficit  Coordination/Balance: Good coordination. LUMBAR/SACRAL EXAMINATION:  Inspection: Local inspection shows no step-off or bruising. Lumbar alignment is normal.  Sagittal and Coronal balance is neutral.      Palpation:   Diffuse tenderness at the midline. No tenderness bilaterally at the paraspinal or trochanters. There is no step-off or paraspinal spasm. Range of Motion: Lumbar flexion, extension and rotation are mildly limited due to pain. Strength:   Strength testing is 5/5 in all muscle groups tested. Special Tests:   Straight leg raise and crossed SLR negative. Leg length and pelvis level. Skin: There are no rashes, ulcerations or lesions. Reflexes: Reflexes are symmetrically 2+ at the patellar and ankle tendons. Clonus absent bilaterally at the feet. Gait & station: normal, patient ambulates without assistance    Additional Examinations:   RIGHT LOWER EXTREMITY: Inspection/examination of the right lower extremity does not show any tenderness, deformity or injury. Range of motion is unremarkable. There is no gross instability. There are no rashes, ulcerations or lesions. Strength and tone are normal.  LEFT LOWER EXTREMITY:  Inspection/examination of the left lower extremity does not show any tenderness, deformity or injury. Range of motion is unremarkable. There is no gross instability. There are no rashes, ulcerations or lesions. Strength and tone are normal.    Diagnostic Testing:    X-rays: 2 views of the lumbar spine to include AP and lateral were obtained today in the office independently reviewed with the patient which shows well-maintained lumbar lordosis with well-maintained vertebral heights. Impression:   Lumbar spondylosis    1. Lumbar pain        Plan:      We discussed treatment options including observation, additional oral steroids, physical therapy, epidural injections and additional imaging.  She wishes to proceed with outpatient physical therapy for lumbar flexibility, core strengthening exercises, and modalities of choice to include dry needling. She was also given information on turmeric should be taken on a daily basis. She may take extra strength Tylenol and ibuprofen as needed. Follow up - Follow up PRN. Old records were reviewed.     Stuart Zabala PA-C  Board certified by the [facility] [address] [facility]

## 2023-09-01 ENCOUNTER — OFFICE VISIT (OUTPATIENT)
Dept: FAMILY MEDICINE CLINIC | Age: 28
End: 2023-09-01

## 2023-09-01 VITALS
WEIGHT: 244 LBS | HEART RATE: 101 BPM | BODY MASS INDEX: 41.66 KG/M2 | OXYGEN SATURATION: 97 % | RESPIRATION RATE: 16 BRPM | DIASTOLIC BLOOD PRESSURE: 84 MMHG | HEIGHT: 64 IN | SYSTOLIC BLOOD PRESSURE: 122 MMHG

## 2023-09-01 DIAGNOSIS — R53.83 OTHER FATIGUE: ICD-10-CM

## 2023-09-01 DIAGNOSIS — E66.01 CLASS 3 SEVERE OBESITY DUE TO EXCESS CALORIES WITHOUT SERIOUS COMORBIDITY WITH BODY MASS INDEX (BMI) OF 40.0 TO 44.9 IN ADULT (HCC): ICD-10-CM

## 2023-09-01 DIAGNOSIS — Z11.59 ENCOUNTER FOR HEPATITIS C SCREENING TEST FOR LOW RISK PATIENT: ICD-10-CM

## 2023-09-01 DIAGNOSIS — R73.03 PRE-DIABETES: ICD-10-CM

## 2023-09-01 DIAGNOSIS — F41.9 ANXIETY AND DEPRESSION: Primary | ICD-10-CM

## 2023-09-01 DIAGNOSIS — F32.A ANXIETY AND DEPRESSION: Primary | ICD-10-CM

## 2023-09-01 DIAGNOSIS — H81.23 VESTIBULAR NEURONITIS OF BOTH EARS: ICD-10-CM

## 2023-09-01 DIAGNOSIS — F90.2 ATTENTION DEFICIT HYPERACTIVITY DISORDER (ADHD), COMBINED TYPE: ICD-10-CM

## 2023-09-01 PROBLEM — N92.0 EXCESSIVE OR FREQUENT MENSTRUATION: Status: ACTIVE | Noted: 2017-01-23

## 2023-09-01 PROBLEM — F12.10 MILD TETRAHYDROCANNABINOL (THC) ABUSE: Status: ACTIVE | Noted: 2019-04-01

## 2023-09-01 PROBLEM — B00.9 HSV INFECTION: Status: ACTIVE | Noted: 2019-04-17

## 2023-09-01 LAB
25(OH)D3 SERPL-MCNC: 20.4 NG/ML
ALBUMIN SERPL-MCNC: 4.3 G/DL (ref 3.4–5)
ALBUMIN/GLOB SERPL: 2.2 {RATIO} (ref 1.1–2.2)
ALP SERPL-CCNC: 61 U/L (ref 40–129)
ALT SERPL-CCNC: 41 U/L (ref 10–40)
ANION GAP SERPL CALCULATED.3IONS-SCNC: 7 MMOL/L (ref 3–16)
AST SERPL-CCNC: 20 U/L (ref 15–37)
BASOPHILS # BLD: 0 K/UL (ref 0–0.2)
BASOPHILS NFR BLD: 0.2 %
BILIRUB SERPL-MCNC: <0.2 MG/DL (ref 0–1)
BUN SERPL-MCNC: 14 MG/DL (ref 7–20)
CALCIUM SERPL-MCNC: 9.5 MG/DL (ref 8.3–10.6)
CHLORIDE SERPL-SCNC: 106 MMOL/L (ref 99–110)
CO2 SERPL-SCNC: 27 MMOL/L (ref 21–32)
CREAT SERPL-MCNC: 0.7 MG/DL (ref 0.6–1.1)
DEPRECATED RDW RBC AUTO: 12.5 % (ref 12.4–15.4)
EOSINOPHIL # BLD: 0.1 K/UL (ref 0–0.6)
EOSINOPHIL NFR BLD: 1 %
FOLATE SERPL-MCNC: 8.36 NG/ML (ref 4.78–24.2)
GFR SERPLBLD CREATININE-BSD FMLA CKD-EPI: >60 ML/MIN/{1.73_M2}
GLUCOSE SERPL-MCNC: 136 MG/DL (ref 70–99)
HCT VFR BLD AUTO: 39.8 % (ref 36–48)
HCV AB SERPL QL IA: NORMAL
HGB BLD-MCNC: 13.7 G/DL (ref 12–16)
LYMPHOCYTES # BLD: 2.2 K/UL (ref 1–5.1)
LYMPHOCYTES NFR BLD: 24.4 %
MCH RBC QN AUTO: 30 PG (ref 26–34)
MCHC RBC AUTO-ENTMCNC: 34.5 G/DL (ref 31–36)
MCV RBC AUTO: 86.8 FL (ref 80–100)
MONOCYTES # BLD: 0.4 K/UL (ref 0–1.3)
MONOCYTES NFR BLD: 4.5 %
NEUTROPHILS # BLD: 6.3 K/UL (ref 1.7–7.7)
NEUTROPHILS NFR BLD: 69.9 %
PLATELET # BLD AUTO: 226 K/UL (ref 135–450)
PMV BLD AUTO: 8 FL (ref 5–10.5)
POTASSIUM SERPL-SCNC: 3.9 MMOL/L (ref 3.5–5.1)
PROT SERPL-MCNC: 6.3 G/DL (ref 6.4–8.2)
RBC # BLD AUTO: 4.59 M/UL (ref 4–5.2)
SODIUM SERPL-SCNC: 140 MMOL/L (ref 136–145)
TSH SERPL DL<=0.005 MIU/L-ACNC: 0.61 UIU/ML (ref 0.27–4.2)
VIT B12 SERPL-MCNC: 698 PG/ML (ref 211–911)
WBC # BLD AUTO: 9 K/UL (ref 4–11)

## 2023-09-01 RX ORDER — ATOMOXETINE 18 MG/1
18 CAPSULE ORAL DAILY
Qty: 30 CAPSULE | Refills: 0 | Status: SHIPPED | OUTPATIENT
Start: 2023-09-01

## 2023-09-01 RX ORDER — MECLIZINE HCL 12.5 MG/1
12.5 TABLET ORAL 3 TIMES DAILY PRN
Qty: 21 TABLET | Refills: 0 | Status: SHIPPED | OUTPATIENT
Start: 2023-09-01 | End: 2023-09-08

## 2023-09-01 SDOH — ECONOMIC STABILITY: HOUSING INSECURITY
IN THE LAST 12 MONTHS, WAS THERE A TIME WHEN YOU DID NOT HAVE A STEADY PLACE TO SLEEP OR SLEPT IN A SHELTER (INCLUDING NOW)?: NO

## 2023-09-01 SDOH — ECONOMIC STABILITY: INCOME INSECURITY: HOW HARD IS IT FOR YOU TO PAY FOR THE VERY BASICS LIKE FOOD, HOUSING, MEDICAL CARE, AND HEATING?: NOT HARD AT ALL

## 2023-09-01 SDOH — ECONOMIC STABILITY: FOOD INSECURITY: WITHIN THE PAST 12 MONTHS, THE FOOD YOU BOUGHT JUST DIDN'T LAST AND YOU DIDN'T HAVE MONEY TO GET MORE.: NEVER TRUE

## 2023-09-01 SDOH — ECONOMIC STABILITY: FOOD INSECURITY: WITHIN THE PAST 12 MONTHS, YOU WORRIED THAT YOUR FOOD WOULD RUN OUT BEFORE YOU GOT MONEY TO BUY MORE.: NEVER TRUE

## 2023-09-01 ASSESSMENT — PATIENT HEALTH QUESTIONNAIRE - PHQ9
2. FEELING DOWN, DEPRESSED OR HOPELESS: 0
3. TROUBLE FALLING OR STAYING ASLEEP: 0
SUM OF ALL RESPONSES TO PHQ9 QUESTIONS 1 & 2: 0
10. IF YOU CHECKED OFF ANY PROBLEMS, HOW DIFFICULT HAVE THESE PROBLEMS MADE IT FOR YOU TO DO YOUR WORK, TAKE CARE OF THINGS AT HOME, OR GET ALONG WITH OTHER PEOPLE: 0
8. MOVING OR SPEAKING SO SLOWLY THAT OTHER PEOPLE COULD HAVE NOTICED. OR THE OPPOSITE, BEING SO FIGETY OR RESTLESS THAT YOU HAVE BEEN MOVING AROUND A LOT MORE THAN USUAL: 0
5. POOR APPETITE OR OVEREATING: 0
SUM OF ALL RESPONSES TO PHQ QUESTIONS 1-9: 0
6. FEELING BAD ABOUT YOURSELF - OR THAT YOU ARE A FAILURE OR HAVE LET YOURSELF OR YOUR FAMILY DOWN: 0
7. TROUBLE CONCENTRATING ON THINGS, SUCH AS READING THE NEWSPAPER OR WATCHING TELEVISION: 0
SUM OF ALL RESPONSES TO PHQ QUESTIONS 1-9: 0
1. LITTLE INTEREST OR PLEASURE IN DOING THINGS: 0
9. THOUGHTS THAT YOU WOULD BE BETTER OFF DEAD, OR OF HURTING YOURSELF: 0
SUM OF ALL RESPONSES TO PHQ QUESTIONS 1-9: 0
4. FEELING TIRED OR HAVING LITTLE ENERGY: 0
SUM OF ALL RESPONSES TO PHQ QUESTIONS 1-9: 0

## 2023-09-01 ASSESSMENT — ENCOUNTER SYMPTOMS
SORE THROAT: 0
ABDOMINAL PAIN: 0
SHORTNESS OF BREATH: 0
BLOOD IN STOOL: 0
WHEEZING: 0
COLOR CHANGE: 0
COUGH: 0
DIARRHEA: 0
CONSTIPATION: 0

## 2023-09-01 NOTE — PROGRESS NOTES
Chhaya Crocker (:  1995) is a 25 y.o. female,New patient, here for evaluation of the following chief complaint(s):  Establish Care (Pt is here to establish care, concerned for diabetes, )         ASSESSMENT/PLAN:  1. Anxiety and depression  - Patient believes mood is well controlled does her best to cope with her mood. Was on medication and counseling in the past endorses that she did not really see any benefit overall is doing well no concerning findings. No thoughts of harm herself or others not answering positive to depression screening questions at this time. 2. Attention deficit hyperactivity disorder (ADHD), combined type  -     atomoxetine (STRATTERA) 18 MG capsule; Take 1 capsule by mouth daily, Disp-30 capsule, R-0Normal  - Patient describes symptoms of ADHD. She also endorses that this could just be what she calls mom brain struggles with completing task in a timely fashion forgetting things having to write things down multiple times to remember to do them. Patient endorses absentmindedness. Discussed options with patient. Feel that Strattera would be a good option. If Strattera does not work for patient will consider referral to psychiatry which patient is agreeable with. Patient was educated on side effects and is agreeable to medication at this time. 3. Other fatigue  -     CBC with Auto Differential; Future  -     Comprehensive Metabolic Panel; Future  -     Vitamin B12 & Folate; Future  -     TSH with Reflex; Future  - Patient endorses some generalized fatigue. She is on for sure if this is just due to being a busy mom or greater because struggled with some weight we will check labs as listed above. 4. Pre-diabetes  -     Hemoglobin A1C; Future   Patient has a history of prediabetes we will recheck A1c at this time.   Last A1c was 6.1.  5. Class 3 severe obesity due to excess calories without serious comorbidity with body mass index (BMI) of 40.0 to 44.9 in adult [facility])  -     CBC

## 2023-09-02 LAB
EST. AVERAGE GLUCOSE BLD GHB EST-MCNC: 128.4 MG/DL
HBA1C MFR BLD: 6.1 %

## 2023-09-05 DIAGNOSIS — E55.9 VITAMIN D INSUFFICIENCY: Primary | ICD-10-CM

## 2023-09-05 RX ORDER — ERGOCALCIFEROL 1.25 MG/1
50000 CAPSULE ORAL WEEKLY
Qty: 4 CAPSULE | Refills: 0 | Status: SHIPPED | OUTPATIENT
Start: 2023-09-05

## 2023-09-23 DIAGNOSIS — F90.2 ATTENTION DEFICIT HYPERACTIVITY DISORDER (ADHD), COMBINED TYPE: ICD-10-CM

## 2023-09-25 RX ORDER — ATOMOXETINE 18 MG/1
CAPSULE ORAL DAILY
Qty: 30 CAPSULE | Refills: 0 | Status: SHIPPED | OUTPATIENT
Start: 2023-09-25

## 2023-09-25 NOTE — TELEPHONE ENCOUNTER
Refill Request     Last Seen: Last Seen Department: 9/1/2023  Last Seen by PCP: 9/1/2023    Last Written: 9/1/23      Next Appointment:   Future Appointments   Date Time Provider [address] [place] [address]   10/6/2023 10:30 AM RAGHAV Kennedy CNP [place] [facility]   11/14/2023  2:00 PM Deanna Bhagat MD [facility] OB[facility]       Requested Prescriptions     Pending Prescriptions Disp Refills    atomoxetine (STRATTERA) 18 MG capsule [Pharmacy Med Name: ATOMOXETINE HCL 18 MG CAPSULE] 30 capsule 0     Sig: TAKE 1 CAPSULE BY MOUTH EVERY DAY

## 2023-10-06 ENCOUNTER — OFFICE VISIT (OUTPATIENT)
Dept: FAMILY MEDICINE CLINIC | Age: 28
End: 2023-10-06
Payer: COMMERCIAL

## 2023-10-06 VITALS
HEIGHT: 64 IN | OXYGEN SATURATION: 97 % | WEIGHT: 241 LBS | HEART RATE: 87 BPM | DIASTOLIC BLOOD PRESSURE: 72 MMHG | SYSTOLIC BLOOD PRESSURE: 122 MMHG | RESPIRATION RATE: 16 BRPM | BODY MASS INDEX: 41.15 KG/M2

## 2023-10-06 DIAGNOSIS — F90.2 ATTENTION DEFICIT HYPERACTIVITY DISORDER (ADHD), COMBINED TYPE: ICD-10-CM

## 2023-10-06 PROCEDURE — 99213 OFFICE O/P EST LOW 20 MIN: CPT

## 2023-10-06 RX ORDER — ATOMOXETINE 40 MG/1
40 CAPSULE ORAL DAILY
Qty: 30 CAPSULE | Refills: 0 | Status: SHIPPED | OUTPATIENT
Start: 2023-10-06 | End: 2023-11-05

## 2023-10-06 ASSESSMENT — ENCOUNTER SYMPTOMS
DIARRHEA: 0
BLOOD IN STOOL: 0
COLOR CHANGE: 0
CONSTIPATION: 0
ABDOMINAL PAIN: 0
WHEEZING: 0
SHORTNESS OF BREATH: 0
SORE THROAT: 0
COUGH: 0

## 2023-10-06 NOTE — PROGRESS NOTES
Polina Brumfield (:  1995) is a 25 y.o. female,Established patient, here for evaluation of the following chief complaint(s):  ADHD (PT is here for 4 week follow up )         ASSESSMENT/PLAN:  1. Attention deficit hyperactivity disorder (ADHD), combined type  -     atomoxetine (STRATTERA) 40 MG capsule; Take 1 capsule by mouth daily, Disp-30 capsule, R-0Normal  -Patient is still struggling with completing task in a timely fashion being well managed and her time. She lacks organization and focus at this time. She endorses that she had no side effects to Strattera and feels that the medication did not help her. Plan at this time is to increase to 40 mg. Patient was again educated on side effects of medication. Patient is agreeable to medication at this time. Patient was educated on worsening mood and when to stop medication and seek medical care. Patient is agreeable to plan at this time. Return in about 4 weeks (around 11/3/2023) for ADHD. Subjective   SUBJECTIVE/OBJECTIVE:  HPI  Patient presents the office today for an ADHD follow-up. Patient was placed on Strattera 18 mg at last office visit. Patient reports no side effects to medication at this time overall she states that she is doing well with no concerns of side effects. Patient endorses that she feels that medication has not made any change in her day-to-day work life. She endorses that everything is still the same. Patient has no other acute concerns  Review of Systems   HENT:  Negative for congestion and sore throat. Respiratory:  Negative for cough, shortness of breath and wheezing. Cardiovascular:  Negative for chest pain and leg swelling. Gastrointestinal:  Negative for abdominal pain, blood in stool, constipation and diarrhea. Endocrine: Negative for cold intolerance and heat intolerance. Genitourinary:  Negative for difficulty urinating, hematuria and urgency.    Musculoskeletal:  Negative for arthralgias and

## 2023-10-10 DIAGNOSIS — E55.9 VITAMIN D INSUFFICIENCY: ICD-10-CM

## 2023-10-10 NOTE — TELEPHONE ENCOUNTER
Refill Request     Last Seen: Last Seen Department: 10/6/2023  Last Seen by PCP: 10/6/2023    Last Written:2023       Next Appointment:   Future Appointments   Date Time Provider [facility] [address]   11/3/2023 10:30 AM Kimani Maritnez, [address]   2023  2:00 PM Brody Tejeda MD [facility]             Requested Prescriptions     Pending Prescriptions Disp Refills    vitamin D (ERGOCALCIFEROL) 1.25 MG (11635 UT) CAPS capsule 4 capsule 0     Sig: Take 1 capsule by mouth once a week

## 2023-10-11 RX ORDER — ERGOCALCIFEROL 1.25 MG/1
50000 CAPSULE ORAL WEEKLY
Qty: 4 CAPSULE | Refills: 0 | Status: SHIPPED | OUTPATIENT
Start: 2023-10-11

## 2023-10-30 DIAGNOSIS — F90.2 ATTENTION DEFICIT HYPERACTIVITY DISORDER (ADHD), COMBINED TYPE: ICD-10-CM

## 2023-10-30 RX ORDER — ATOMOXETINE 40 MG/1
40 CAPSULE ORAL DAILY
Qty: 30 CAPSULE | Refills: 0 | Status: SHIPPED | OUTPATIENT
Start: 2023-10-30 | End: 2023-11-01 | Stop reason: SDUPTHER

## 2023-10-30 NOTE — TELEPHONE ENCOUNTER
Refill Request         Last Seen: Last Seen Department: 10/6/2023  Last Seen by PCP: 10/6/2023    Last Written: 10/06/2023    Next Appointment:   Future Appointments   Date Time Provider [address] [place] [address]   11/2/2023  9:00 AM Melanie Wayne, [address]   11/14/2023  2:00 PM Dwight Kelly MD [facility]           Requested Prescriptions     Pending Prescriptions Disp Refills    atomoxetine (STRATTERA) 40 MG capsule 90 capsule 0     Sig: Take 1 capsule by mouth daily

## 2023-11-01 DIAGNOSIS — F90.2 ATTENTION DEFICIT HYPERACTIVITY DISORDER (ADHD), COMBINED TYPE: ICD-10-CM

## 2023-11-01 RX ORDER — ATOMOXETINE 40 MG/1
40 CAPSULE ORAL DAILY
Qty: 90 CAPSULE | Refills: 0 | Status: SHIPPED | OUTPATIENT
Start: 2023-11-01 | End: 2024-01-30

## 2023-11-02 ENCOUNTER — OFFICE VISIT (OUTPATIENT)
Dept: FAMILY MEDICINE CLINIC | Age: 28
End: 2023-11-02
Payer: COMMERCIAL

## 2023-11-02 VITALS
BODY MASS INDEX: 41.38 KG/M2 | DIASTOLIC BLOOD PRESSURE: 76 MMHG | SYSTOLIC BLOOD PRESSURE: 118 MMHG | HEART RATE: 94 BPM | WEIGHT: 241.2 LBS | OXYGEN SATURATION: 97 %

## 2023-11-02 DIAGNOSIS — E55.9 VITAMIN D INSUFFICIENCY: ICD-10-CM

## 2023-11-02 DIAGNOSIS — F90.2 ATTENTION DEFICIT HYPERACTIVITY DISORDER (ADHD), COMBINED TYPE: Primary | ICD-10-CM

## 2023-11-02 LAB — 25(OH)D3 SERPL-MCNC: 28.8 NG/ML

## 2023-11-02 PROCEDURE — 36415 COLL VENOUS BLD VENIPUNCTURE: CPT

## 2023-11-02 PROCEDURE — 99213 OFFICE O/P EST LOW 20 MIN: CPT

## 2023-11-02 RX ORDER — ACETAMINOPHEN 160 MG
2 TABLET,DISINTEGRATING ORAL DAILY
Qty: 180 CAPSULE | Refills: 0 | Status: SHIPPED | OUTPATIENT
Start: 2023-11-02 | End: 2024-01-31

## 2023-11-02 ASSESSMENT — ENCOUNTER SYMPTOMS
SHORTNESS OF BREATH: 0
ABDOMINAL PAIN: 0
DIARRHEA: 0
CONSTIPATION: 0
BLOOD IN STOOL: 0
WHEEZING: 0
COUGH: 0
COLOR CHANGE: 0
SORE THROAT: 0

## 2023-11-02 NOTE — PROGRESS NOTES
Parisa Salomon (:  1995) is a 25 y.o. female,Established patient, here for evaluation of the following chief complaint(s):  ADHD (4 week f/u)         ASSESSMENT/PLAN:  1. Attention deficit hyperactivity disorder (ADHD), combined type  - We will continue Strattera 40 mg at this time overall patient is doing well no acute concerns. Patient will continue to work on coping mechanisms to help remain focused and stay stable at task. Had lengthy discussion with patient today regarding those coping mechanisms and strategies to manage ADHD on nonpharmacological manner. Patient is agreeable and understands education. Strattera was recently sent to the pharmacy. 2. Vitamin D insufficiency  -     Vitamin D 25 Hydroxy; Future  -Patient is currently on high-dose vitamin D we will recheck vitamin D level with the goal to transition patient to 4 to 5000 units of vitamin D daily. Patient was educated on this plan and is agreeable at this time    Return in about 3 months (around 2024) for ADHD. Subjective   SUBJECTIVE/OBJECTIVE:  HPI: Patient presents to the office today for follow-up regarding ADHD. Patient is currently taking Strattera doing well endorses still a few moments of having to work hard to stay focused and remember certain things however overall is feeling more on task at work this is her busy season at work and is able to keep up with all the work and is being more efficient in doing so. Patient endorses no side effects to medication doing well with no acute concerns. Review of Systems   HENT:  Negative for congestion and sore throat. Respiratory:  Negative for cough, shortness of breath and wheezing. Cardiovascular:  Negative for chest pain and leg swelling. Gastrointestinal:  Negative for abdominal pain, blood in stool, constipation and diarrhea. Endocrine: Negative for cold intolerance and heat intolerance.    Genitourinary:  Negative for difficulty urinating, hematuria

## 2023-11-14 ENCOUNTER — OFFICE VISIT (OUTPATIENT)
Dept: OBGYN CLINIC | Age: 28
End: 2023-11-14
Payer: COMMERCIAL

## 2023-11-14 VITALS
DIASTOLIC BLOOD PRESSURE: 78 MMHG | WEIGHT: 240.1 LBS | RESPIRATION RATE: 16 BRPM | HEART RATE: 103 BPM | BODY MASS INDEX: 40.99 KG/M2 | TEMPERATURE: 98.9 F | SYSTOLIC BLOOD PRESSURE: 121 MMHG | HEIGHT: 64 IN | OXYGEN SATURATION: 98 %

## 2023-11-14 DIAGNOSIS — Z12.4 PAP SMEAR FOR CERVICAL CANCER SCREENING: ICD-10-CM

## 2023-11-14 DIAGNOSIS — Z01.419 ENCOUNTER FOR ANNUAL ROUTINE GYNECOLOGICAL EXAMINATION: Primary | ICD-10-CM

## 2023-11-14 DIAGNOSIS — N94.6 DYSMENORRHEA: ICD-10-CM

## 2023-11-14 PROCEDURE — 99395 PREV VISIT EST AGE 18-39: CPT | Performed by: OBSTETRICS & GYNECOLOGY

## 2023-11-14 RX ORDER — NAPROXEN 500 MG/1
500 TABLET ORAL 2 TIMES DAILY WITH MEALS
Qty: 60 TABLET | Refills: 1 | Status: SHIPPED | OUTPATIENT
Start: 2023-11-14

## 2023-11-14 ASSESSMENT — ENCOUNTER SYMPTOMS
CONSTIPATION: 0
NAUSEA: 0
ABDOMINAL PAIN: 0
DIARRHEA: 0
VOMITING: 0
SHORTNESS OF BREATH: 0

## 2023-11-14 NOTE — PROGRESS NOTES
History:  Past Surgical History:   Procedure Laterality Date     SECTION N/A 10/8/2021     SECTION performed by Lelia Duverney, MD at [facility] L&D OR    MOUTH SURGERY      CYST    TONSILLECTOMY         Medications:  Current Outpatient Medications   Medication Sig Dispense Refill    Cholecalciferol (VITAMIN D3) 50 MCG (2000 UT) CAPS Take 2 capsules by mouth daily 180 capsule 0    atomoxetine (STRATTERA) 40 MG capsule Take 1 capsule by mouth daily 90 capsule 0    vitamin D (ERGOCALCIFEROL) 1.25 MG (75535 UT) CAPS capsule Take 1 capsule by mouth once a week 4 capsule 0     No current facility-administered medications for this visit. Allergies:   Allergies   Allergen Reactions    Sulfa Antibiotics        Social History:  Social History     Socioeconomic History    Marital status: Single     Spouse name: None    Number of children: None    Years of education: None    Highest education level: None   Tobacco Use    Smoking status: Every Day     Packs/day: 0.50     Years: 6.00     Additional pack years: 0.00     Total pack years: 3.00     Types: Cigarettes     Last attempt to quit: 2018     Years since quittin.8    Smokeless tobacco: Never    Tobacco comments:     down 4-5 cig a day   Vaping Use    Vaping Use: Never used   Substance and Sexual Activity    Alcohol use: Not Currently    Drug use: Never    Sexual activity: Yes     Partners: Male     Social Determinants of Health     Financial Resource Strain: Low Risk  (2023)    Overall Financial Resource Strain (CARDIA)     Difficulty of Paying Living Expenses: Not hard at all   Food Insecurity: No Food Insecurity (2023)    Hunger Vital Sign     Worried About Running Out of Food in the Last Year: Never true     Ran Out of Food in the Last Year: Never true   Transportation Needs: Unknown (2023)    PRAPARE - Transportation     Lack of Transportation (Non-Medical): No   Housing Stability: Unknown (2023)    Housing Stability Vital Sign

## 2023-11-15 LAB — HPV16+18+H RISK 12 DNA SPEC-IMP: NORMAL

## 2023-11-21 LAB
HPV HR 12 DNA SPEC QL NAA+PROBE: NOT DETECTED
HPV16 DNA SPEC QL NAA+PROBE: NOT DETECTED
HPV16+18+H RISK 12 DNA SPEC-IMP: NORMAL
HPV18 DNA SPEC QL NAA+PROBE: NOT DETECTED

## 2024-01-31 DIAGNOSIS — F90.2 ATTENTION DEFICIT HYPERACTIVITY DISORDER (ADHD), COMBINED TYPE: ICD-10-CM

## 2024-01-31 DIAGNOSIS — E55.9 VITAMIN D INSUFFICIENCY: ICD-10-CM

## 2024-01-31 RX ORDER — ACETAMINOPHEN 160 MG
2 TABLET,DISINTEGRATING ORAL DAILY
Qty: 180 CAPSULE | Refills: 0 | Status: SHIPPED | OUTPATIENT
Start: 2024-01-31

## 2024-01-31 RX ORDER — ATOMOXETINE 40 MG/1
CAPSULE ORAL DAILY
Qty: 90 CAPSULE | Refills: 0 | Status: SHIPPED | OUTPATIENT
Start: 2024-01-31 | End: 2024-02-02 | Stop reason: SDUPTHER

## 2024-01-31 NOTE — TELEPHONE ENCOUNTER
Refill Request         Last Seen: Last Seen Department: 11/2/2023  Last Seen by PCP: 11/2/2023    Last Written: 11/01/2023          Next Appointment:   Future Appointments   Date Time Provider Department Center   2/2/2024  9:00 AM Guido Tellez, RAGHAV - CNP [place] [facility]             Requested Prescriptions     Pending Prescriptions Disp Refills    atomoxetine (STRATTERA) 40 MG capsule [Pharmacy Med Name: ATOMOXETINE HCL 40 MG CAPSULE] 90 capsule 0     Sig: TAKE 1 CAPSULE BY MOUTH EVERY DAY    Cholecalciferol (VITAMIN D3) 50 MCG (2000 UT) CAPS [Pharmacy Med Name: VITAMIN D3 50 MCG SOFTGEL] 180 capsule 0     Sig: TAKE 2 CAPSULES BY MOUTH EVERY DAY

## 2024-02-02 ENCOUNTER — OFFICE VISIT (OUTPATIENT)
Dept: FAMILY MEDICINE CLINIC | Age: 29
End: 2024-02-02
Payer: COMMERCIAL

## 2024-02-02 VITALS
HEART RATE: 91 BPM | HEIGHT: 64 IN | OXYGEN SATURATION: 100 % | BODY MASS INDEX: 41.25 KG/M2 | WEIGHT: 241.6 LBS | DIASTOLIC BLOOD PRESSURE: 82 MMHG | SYSTOLIC BLOOD PRESSURE: 104 MMHG | RESPIRATION RATE: 16 BRPM

## 2024-02-02 DIAGNOSIS — F90.2 ATTENTION DEFICIT HYPERACTIVITY DISORDER (ADHD), COMBINED TYPE: ICD-10-CM

## 2024-02-02 PROCEDURE — G8417 CALC BMI ABV UP PARAM F/U: HCPCS

## 2024-02-02 PROCEDURE — 4004F PT TOBACCO SCREEN RCVD TLK: CPT

## 2024-02-02 PROCEDURE — 99213 OFFICE O/P EST LOW 20 MIN: CPT

## 2024-02-02 PROCEDURE — G8484 FLU IMMUNIZE NO ADMIN: HCPCS

## 2024-02-02 PROCEDURE — G8427 DOCREV CUR MEDS BY ELIG CLIN: HCPCS

## 2024-02-02 RX ORDER — ATOMOXETINE 40 MG/1
40 CAPSULE ORAL DAILY
Qty: 90 CAPSULE | Refills: 0 | Status: SHIPPED | OUTPATIENT
Start: 2024-02-02 | End: 2024-05-02

## 2024-02-02 RX ORDER — NAPROXEN 500 MG/1
500 TABLET ORAL 2 TIMES DAILY WITH MEALS
Qty: 60 TABLET | Refills: 1 | Status: SHIPPED | OUTPATIENT
Start: 2024-02-02

## 2024-02-02 ASSESSMENT — PATIENT HEALTH QUESTIONNAIRE - PHQ9
9. THOUGHTS THAT YOU WOULD BE BETTER OFF DEAD, OR OF HURTING YOURSELF: 0
SUM OF ALL RESPONSES TO PHQ QUESTIONS 1-9: 0
SUM OF ALL RESPONSES TO PHQ QUESTIONS 1-9: 0
SUM OF ALL RESPONSES TO PHQ9 QUESTIONS 1 & 2: 0
2. FEELING DOWN, DEPRESSED OR HOPELESS: 0
8. MOVING OR SPEAKING SO SLOWLY THAT OTHER PEOPLE COULD HAVE NOTICED. OR THE OPPOSITE, BEING SO FIGETY OR RESTLESS THAT YOU HAVE BEEN MOVING AROUND A LOT MORE THAN USUAL: 0
1. LITTLE INTEREST OR PLEASURE IN DOING THINGS: 0
5. POOR APPETITE OR OVEREATING: 0
SUM OF ALL RESPONSES TO PHQ QUESTIONS 1-9: 0
7. TROUBLE CONCENTRATING ON THINGS, SUCH AS READING THE NEWSPAPER OR WATCHING TELEVISION: 0
SUM OF ALL RESPONSES TO PHQ QUESTIONS 1-9: 0
6. FEELING BAD ABOUT YOURSELF - OR THAT YOU ARE A FAILURE OR HAVE LET YOURSELF OR YOUR FAMILY DOWN: 0
3. TROUBLE FALLING OR STAYING ASLEEP: 0
10. IF YOU CHECKED OFF ANY PROBLEMS, HOW DIFFICULT HAVE THESE PROBLEMS MADE IT FOR YOU TO DO YOUR WORK, TAKE CARE OF THINGS AT HOME, OR GET ALONG WITH OTHER PEOPLE: 0
4. FEELING TIRED OR HAVING LITTLE ENERGY: 0

## 2024-02-02 ASSESSMENT — ENCOUNTER SYMPTOMS
COLOR CHANGE: 0
CONSTIPATION: 0
COUGH: 0
DIARRHEA: 0
BLOOD IN STOOL: 0
WHEEZING: 0
ABDOMINAL PAIN: 0
SORE THROAT: 0
SHORTNESS OF BREATH: 0

## 2024-02-02 NOTE — PROGRESS NOTES
intolerance.   Genitourinary:  Negative for difficulty urinating, hematuria and urgency.   Musculoskeletal:  Negative for arthralgias and gait problem.   Skin:  Negative for color change.   Neurological:  Negative for dizziness, seizures, light-headedness and headaches.   Psychiatric/Behavioral:  Negative for agitation and confusion. The patient is not nervous/anxious.           Objective   /82 (Site: Right Upper Arm, Position: Sitting)   Pulse 91   Resp 16   Ht 1.626 m (5' 4\")   Wt 109.6 kg (241 lb 9.6 oz)   SpO2 100%   BMI 41.47 kg/m²    Physical Exam  Vitals reviewed.   Constitutional:       General: She is not in acute distress.     Appearance: Normal appearance.   HENT:      Right Ear: Tympanic membrane normal.      Left Ear: Tympanic membrane normal.   Eyes:      Pupils: Pupils are equal, round, and reactive to light.   Cardiovascular:      Rate and Rhythm: Normal rate and regular rhythm.   Pulmonary:      Effort: Pulmonary effort is normal.      Breath sounds: Normal breath sounds.   Abdominal:      General: Abdomen is flat. Bowel sounds are normal.      Palpations: Abdomen is soft.   Musculoskeletal:         General: Normal range of motion.   Skin:     General: Skin is warm.   Neurological:      General: No focal deficit present.      Mental Status: She is alert.   Psychiatric:         Mood and Affect: Mood normal.         Behavior: Behavior normal.         Judgment: Judgment normal.            This note was generated completely or in part utilizing Dragon dictation speech recognition software.  Occasionally, words are mistranscribed and despite editing, the text may contain inaccuracies due to incorrect word recognition.  If further clarification is needed please contact the office at [phone].     An electronic signature was used to authenticate this note.    --RAGHAV Kaufman - CNP

## 2024-04-27 DIAGNOSIS — E55.9 VITAMIN D INSUFFICIENCY: ICD-10-CM

## 2024-04-29 RX ORDER — ACETAMINOPHEN 160 MG
TABLET,DISINTEGRATING ORAL DAILY
Qty: 180 CAPSULE | Refills: 0 | Status: SHIPPED | OUTPATIENT
Start: 2024-04-29

## 2024-04-29 NOTE — TELEPHONE ENCOUNTER
Refill Request     Last Seen: Last Seen Department: 2/2/2024  Last Seen by PCP: Visit date not found    Last Written: 1/31/24      Next Appointment:   Future Appointments   Date Time Provider Department Center   8/2/2024 10:00 AM Guido Tellez APRN - CNP [place] [facility]           Requested Prescriptions     Pending Prescriptions Disp Refills    VITAMIN D3 50 MCG (2000 UT) CAPS capsule [Pharmacy Med Name: VITAMIN D3 50 MCG SOFTGEL] 180 capsule 0     Sig: TAKE 2 CAPSULES BY MOUTH EVERY DAY       2 seconds or less

## 2024-06-01 DIAGNOSIS — F90.2 ATTENTION DEFICIT HYPERACTIVITY DISORDER (ADHD), COMBINED TYPE: ICD-10-CM

## 2024-06-03 RX ORDER — ATOMOXETINE 40 MG/1
CAPSULE ORAL DAILY
Qty: 90 CAPSULE | Refills: 0 | Status: SHIPPED | OUTPATIENT
Start: 2024-06-03

## 2024-06-03 NOTE — TELEPHONE ENCOUNTER
Refill Request     Last Seen: Last Seen Department: 2/2/2024  Last Seen by PCP: 2/2/2024        Next Appointment:   Future Appointments   Date Time Provider Department Center   8/2/2024 10:00 AM Guido Tellez APRN - CNP [place] [facility]           Requested Prescriptions     Pending Prescriptions Disp Refills    atomoxetine (STRATTERA) 40 MG capsule [Pharmacy Med Name: ATOMOXETINE HCL 40 MG CAPSULE] 90 capsule 0     Sig: TAKE 1 CAPSULE BY MOUTH EVERY DAY

## 2024-08-02 ENCOUNTER — OFFICE VISIT (OUTPATIENT)
Dept: FAMILY MEDICINE CLINIC | Age: 29
End: 2024-08-02
Payer: COMMERCIAL

## 2024-08-02 ENCOUNTER — HOSPITAL ENCOUNTER (OUTPATIENT)
Dept: GENERAL RADIOLOGY | Age: 29
Discharge: HOME | End: 2024-08-02
Payer: COMMERCIAL

## 2024-08-02 VITALS
HEIGHT: 64 IN | SYSTOLIC BLOOD PRESSURE: 130 MMHG | BODY MASS INDEX: 41.45 KG/M2 | HEART RATE: 71 BPM | WEIGHT: 242.8 LBS | DIASTOLIC BLOOD PRESSURE: 80 MMHG | OXYGEN SATURATION: 98 %

## 2024-08-02 DIAGNOSIS — R73.03 PRE-DIABETES: ICD-10-CM

## 2024-08-02 DIAGNOSIS — F90.2 ATTENTION DEFICIT HYPERACTIVITY DISORDER (ADHD), COMBINED TYPE: ICD-10-CM

## 2024-08-02 DIAGNOSIS — S99.922A FOOT INJURY, LEFT, INITIAL ENCOUNTER: Primary | ICD-10-CM

## 2024-08-02 DIAGNOSIS — S99.922A FOOT INJURY, LEFT, INITIAL ENCOUNTER: ICD-10-CM

## 2024-08-02 DIAGNOSIS — E66.01 CLASS 3 SEVERE OBESITY DUE TO EXCESS CALORIES WITHOUT SERIOUS COMORBIDITY WITH BODY MASS INDEX (BMI) OF 40.0 TO 44.9 IN ADULT (HCC): ICD-10-CM

## 2024-08-02 LAB
ALBUMIN SERPL-MCNC: 4.3 G/DL (ref 3.4–5)
ALBUMIN/GLOB SERPL: 1.9 {RATIO} (ref 1.1–2.2)
ALP SERPL-CCNC: 62 U/L (ref 40–129)
ALT SERPL-CCNC: 43 U/L (ref 10–40)
ANION GAP SERPL CALCULATED.3IONS-SCNC: 14 MMOL/L (ref 3–16)
AST SERPL-CCNC: 23 U/L (ref 15–37)
BASOPHILS # BLD: 0 K/UL (ref 0–0.2)
BASOPHILS NFR BLD: 0.4 %
BILIRUB SERPL-MCNC: 0.4 MG/DL (ref 0–1)
BUN SERPL-MCNC: 16 MG/DL (ref 7–20)
CALCIUM SERPL-MCNC: 9.6 MG/DL (ref 8.3–10.6)
CHLORIDE SERPL-SCNC: 104 MMOL/L (ref 99–110)
CO2 SERPL-SCNC: 24 MMOL/L (ref 21–32)
CREAT SERPL-MCNC: 0.7 MG/DL (ref 0.6–1.1)
DEPRECATED RDW RBC AUTO: 12.4 % (ref 12.4–15.4)
EOSINOPHIL # BLD: 0.1 K/UL (ref 0–0.6)
EOSINOPHIL NFR BLD: 1 %
GFR SERPLBLD CREATININE-BSD FMLA CKD-EPI: >90 ML/MIN/{1.73_M2}
GLUCOSE SERPL-MCNC: 100 MG/DL (ref 70–99)
HCT VFR BLD AUTO: 39.4 % (ref 36–48)
HGB BLD-MCNC: 13.7 G/DL (ref 12–16)
LYMPHOCYTES # BLD: 2.3 K/UL (ref 1–5.1)
LYMPHOCYTES NFR BLD: 32.5 %
MCH RBC QN AUTO: 30.4 PG (ref 26–34)
MCHC RBC AUTO-ENTMCNC: 34.9 G/DL (ref 31–36)
MCV RBC AUTO: 87.1 FL (ref 80–100)
MONOCYTES # BLD: 0.3 K/UL (ref 0–1.3)
MONOCYTES NFR BLD: 4 %
NEUTROPHILS # BLD: 4.4 K/UL (ref 1.7–7.7)
NEUTROPHILS NFR BLD: 62.1 %
PLATELET # BLD AUTO: 206 K/UL (ref 135–450)
PMV BLD AUTO: 8 FL (ref 5–10.5)
POTASSIUM SERPL-SCNC: 4.9 MMOL/L (ref 3.5–5.1)
PROT SERPL-MCNC: 6.6 G/DL (ref 6.4–8.2)
RBC # BLD AUTO: 4.53 M/UL (ref 4–5.2)
SODIUM SERPL-SCNC: 142 MMOL/L (ref 136–145)
TSH SERPL DL<=0.005 MIU/L-ACNC: 0.75 UIU/ML (ref 0.27–4.2)
WBC # BLD AUTO: 7 K/UL (ref 4–11)

## 2024-08-02 PROCEDURE — G8417 CALC BMI ABV UP PARAM F/U: HCPCS

## 2024-08-02 PROCEDURE — 73630 X-RAY EXAM OF FOOT: CPT

## 2024-08-02 PROCEDURE — G8427 DOCREV CUR MEDS BY ELIG CLIN: HCPCS

## 2024-08-02 PROCEDURE — 99214 OFFICE O/P EST MOD 30 MIN: CPT

## 2024-08-02 PROCEDURE — 36415 COLL VENOUS BLD VENIPUNCTURE: CPT

## 2024-08-02 PROCEDURE — 1036F TOBACCO NON-USER: CPT

## 2024-08-02 RX ORDER — ATOMOXETINE 40 MG/1
40 CAPSULE ORAL DAILY
Qty: 90 CAPSULE | Refills: 0 | Status: SHIPPED | OUTPATIENT
Start: 2024-08-02

## 2024-08-02 ASSESSMENT — ENCOUNTER SYMPTOMS
COUGH: 0
WHEEZING: 0
DIARRHEA: 0
COLOR CHANGE: 0
CONSTIPATION: 0
ABDOMINAL PAIN: 0
SORE THROAT: 0
SHORTNESS OF BREATH: 0
BLOOD IN STOOL: 0

## 2024-08-02 NOTE — PROGRESS NOTES
Linda Hodgson (:  1995) is a 29 y.o. female,Established patient, here for evaluation of the following chief complaint(s):  ADHD and Foot Pain (Patients daughter hit her left foot with fast pitch softball x 2 days  painful and swelling )      Assessment & Plan   ASSESSMENT/PLAN:  1. Foot injury, left, initial encounter  -     XR FOOT LEFT (MIN 3 VIEWS); Future  - Foot injury after being hit with a softball at around 30 miles an hour.  A lot of bruising and swelling noted on physical exam.  Good range of motion and movement.  Encourage patient on the RICE method as well as completing x-ray.  Educated that ibuprofen can be helpful in managing the pain.  2. Attention deficit hyperactivity disorder (ADHD), combined type  -     atomoxetine (STRATTERA) 40 MG capsule; Take 1 capsule by mouth daily, Disp-90 capsule, R-0Normal  - Doing well on Strattera 40 mg we will continue at this time.  Refills were given check labs for routine liver function and maintenance  3. Pre-diabetes  -     Hemoglobin A1C; Future  - Previous A1c 6.1.  Patient continues to work on diet and exercise.  Patient has been unable to lose weight despite changing diet drastically.  Will continue to monitor A1c.  Lab Results   Component Value Date    LABA1C 6.1 2023    LABA1C 6.1 2021    LABA1C 5.5 2019     Lab Results   Component Value Date    .4 2023    .4 2021    .2 2019        4. Class 3 severe obesity due to excess calories without serious comorbidity with body mass index (BMI) of 40.0 to 44.9 in adult (HCC)  -     CBC with Auto Differential; Future  -     Comprehensive Metabolic Panel; Future  -     TSH with Reflex; Future  -     Semaglutide-Weight Management (WEGOVY) 0.25 MG/0.5ML SOAJ SC injection; Inject 0.25 mg into the skin every 7 days, Disp-2 mL, R-0Normal  -Had lengthy discussion with patient regarding weight loss today in office.  Discussed weight loss options.  Spent time

## 2024-08-03 LAB
EST. AVERAGE GLUCOSE BLD GHB EST-MCNC: 114 MG/DL
HBA1C MFR BLD: 5.6 %

## 2024-08-08 ENCOUNTER — TELEPHONE (OUTPATIENT)
Dept: FAMILY MEDICINE CLINIC | Age: 29
End: 2024-08-08

## 2024-08-08 DIAGNOSIS — E66.01 CLASS 3 SEVERE OBESITY DUE TO EXCESS CALORIES WITHOUT SERIOUS COMORBIDITY WITH BODY MASS INDEX (BMI) OF 40.0 TO 44.9 IN ADULT (HCC): ICD-10-CM

## 2024-08-12 NOTE — TELEPHONE ENCOUNTER
Prior authorization required for Wegovy  
Pt informed that Wegovy was denied, please send to [facility].   
Submitted PA for Wegovy 0.25MG/0.5ML auto-injectors  [facility] [id number] STATUS: PENDING.    Follow up done daily; if no decision with in three days we will refax.  If another three days goes by with no decision will call the insurance for status.    
The medication was DENIED; DENIAL letter uploaded to MEDIA.    If you want an APPEAL; please note in this encounter what new information you would like to APPEAL with.  Once complete route back to PA POOL.    If this requires a response please respond to the pool ( P MHCX PSC MEDICATION PRE-AUTH).      Thank you please advise patient.    
OBSERVATION

## 2024-08-29 DIAGNOSIS — E66.01 CLASS 3 SEVERE OBESITY DUE TO EXCESS CALORIES WITHOUT SERIOUS COMORBIDITY WITH BODY MASS INDEX (BMI) OF 40.0 TO 44.9 IN ADULT (HCC): ICD-10-CM

## 2024-08-29 DIAGNOSIS — F90.2 ATTENTION DEFICIT HYPERACTIVITY DISORDER (ADHD), COMBINED TYPE: ICD-10-CM

## 2024-08-29 RX ORDER — ATOMOXETINE 40 MG/1
40 CAPSULE ORAL DAILY
Qty: 180 CAPSULE | Refills: 0 | Status: SHIPPED | OUTPATIENT
Start: 2024-08-29 | End: 2025-02-25

## 2024-09-25 ENCOUNTER — OFFICE VISIT (OUTPATIENT)
Dept: FAMILY MEDICINE CLINIC | Age: 29
End: 2024-09-25
Payer: COMMERCIAL

## 2024-09-25 VITALS
OXYGEN SATURATION: 98 % | HEART RATE: 97 BPM | SYSTOLIC BLOOD PRESSURE: 124 MMHG | BODY MASS INDEX: 40.49 KG/M2 | HEIGHT: 64 IN | DIASTOLIC BLOOD PRESSURE: 66 MMHG | WEIGHT: 237.2 LBS

## 2024-09-25 DIAGNOSIS — F90.0 ATTENTION DEFICIT HYPERACTIVITY DISORDER (ADHD), PREDOMINANTLY INATTENTIVE TYPE: ICD-10-CM

## 2024-09-25 DIAGNOSIS — E66.01 CLASS 3 SEVERE OBESITY DUE TO EXCESS CALORIES WITHOUT SERIOUS COMORBIDITY WITH BODY MASS INDEX (BMI) OF 40.0 TO 44.9 IN ADULT: Primary | ICD-10-CM

## 2024-09-25 DIAGNOSIS — E55.9 VITAMIN D INSUFFICIENCY: ICD-10-CM

## 2024-09-25 PROCEDURE — G8417 CALC BMI ABV UP PARAM F/U: HCPCS | Performed by: STUDENT IN AN ORGANIZED HEALTH CARE EDUCATION/TRAINING PROGRAM

## 2024-09-25 PROCEDURE — 99214 OFFICE O/P EST MOD 30 MIN: CPT | Performed by: STUDENT IN AN ORGANIZED HEALTH CARE EDUCATION/TRAINING PROGRAM

## 2024-09-25 PROCEDURE — G8427 DOCREV CUR MEDS BY ELIG CLIN: HCPCS | Performed by: STUDENT IN AN ORGANIZED HEALTH CARE EDUCATION/TRAINING PROGRAM

## 2024-09-25 PROCEDURE — 4004F PT TOBACCO SCREEN RCVD TLK: CPT | Performed by: STUDENT IN AN ORGANIZED HEALTH CARE EDUCATION/TRAINING PROGRAM

## 2024-09-25 RX ORDER — SEMAGLUTIDE 0.5 MG/.5ML
0.5 INJECTION, SOLUTION SUBCUTANEOUS
Qty: 2 ML | Refills: 0 | Status: SHIPPED | OUTPATIENT
Start: 2024-09-25

## 2024-09-25 RX ORDER — ACETAMINOPHEN 160 MG
1 TABLET,DISINTEGRATING ORAL DAILY
Qty: 90 CAPSULE | Refills: 3 | Status: SHIPPED | OUTPATIENT
Start: 2024-09-25

## 2024-09-25 RX ORDER — ACETAMINOPHEN 160 MG
TABLET,DISINTEGRATING ORAL DAILY
Qty: 180 CAPSULE | Refills: 0 | Status: CANCELLED | OUTPATIENT
Start: 2024-09-25

## 2024-09-25 SDOH — ECONOMIC STABILITY: FOOD INSECURITY: WITHIN THE PAST 12 MONTHS, YOU WORRIED THAT YOUR FOOD WOULD RUN OUT BEFORE YOU GOT MONEY TO BUY MORE.: NEVER TRUE

## 2024-09-25 SDOH — ECONOMIC STABILITY: INCOME INSECURITY: HOW HARD IS IT FOR YOU TO PAY FOR THE VERY BASICS LIKE FOOD, HOUSING, MEDICAL CARE, AND HEATING?: NOT HARD AT ALL

## 2024-09-25 SDOH — ECONOMIC STABILITY: FOOD INSECURITY: WITHIN THE PAST 12 MONTHS, THE FOOD YOU BOUGHT JUST DIDN'T LAST AND YOU DIDN'T HAVE MONEY TO GET MORE.: NEVER TRUE

## 2024-10-25 ENCOUNTER — OFFICE VISIT (OUTPATIENT)
Dept: FAMILY MEDICINE CLINIC | Age: 29
End: 2024-10-25
Payer: COMMERCIAL

## 2024-10-25 VITALS
WEIGHT: 231.6 LBS | HEART RATE: 101 BPM | OXYGEN SATURATION: 98 % | SYSTOLIC BLOOD PRESSURE: 116 MMHG | DIASTOLIC BLOOD PRESSURE: 60 MMHG | HEIGHT: 64 IN | BODY MASS INDEX: 39.54 KG/M2

## 2024-10-25 DIAGNOSIS — E55.9 VITAMIN D INSUFFICIENCY: ICD-10-CM

## 2024-10-25 DIAGNOSIS — E66.01 CLASS 3 SEVERE OBESITY DUE TO EXCESS CALORIES WITHOUT SERIOUS COMORBIDITY WITH BODY MASS INDEX (BMI) OF 40.0 TO 44.9 IN ADULT: Primary | ICD-10-CM

## 2024-10-25 DIAGNOSIS — E66.813 CLASS 3 SEVERE OBESITY DUE TO EXCESS CALORIES WITHOUT SERIOUS COMORBIDITY WITH BODY MASS INDEX (BMI) OF 40.0 TO 44.9 IN ADULT: Primary | ICD-10-CM

## 2024-10-25 PROCEDURE — 4004F PT TOBACCO SCREEN RCVD TLK: CPT | Performed by: STUDENT IN AN ORGANIZED HEALTH CARE EDUCATION/TRAINING PROGRAM

## 2024-10-25 PROCEDURE — 99214 OFFICE O/P EST MOD 30 MIN: CPT | Performed by: STUDENT IN AN ORGANIZED HEALTH CARE EDUCATION/TRAINING PROGRAM

## 2024-10-25 PROCEDURE — G8484 FLU IMMUNIZE NO ADMIN: HCPCS | Performed by: STUDENT IN AN ORGANIZED HEALTH CARE EDUCATION/TRAINING PROGRAM

## 2024-10-25 PROCEDURE — G8427 DOCREV CUR MEDS BY ELIG CLIN: HCPCS | Performed by: STUDENT IN AN ORGANIZED HEALTH CARE EDUCATION/TRAINING PROGRAM

## 2024-10-25 PROCEDURE — G8417 CALC BMI ABV UP PARAM F/U: HCPCS | Performed by: STUDENT IN AN ORGANIZED HEALTH CARE EDUCATION/TRAINING PROGRAM

## 2024-10-25 RX ORDER — SEMAGLUTIDE 1 MG/.5ML
1 INJECTION, SOLUTION SUBCUTANEOUS
Qty: 2 ML | Refills: 1 | Status: SHIPPED | OUTPATIENT
Start: 2024-10-25

## 2024-10-25 RX ORDER — ACETAMINOPHEN 160 MG
2000 TABLET,DISINTEGRATING ORAL DAILY
Qty: 90 CAPSULE | Refills: 3 | Status: CANCELLED | OUTPATIENT
Start: 2024-10-25

## 2024-10-25 RX ORDER — ACETAMINOPHEN 160 MG
2000 TABLET,DISINTEGRATING ORAL DAILY
Qty: 90 CAPSULE | Refills: 3 | Status: SHIPPED | OUTPATIENT
Start: 2024-10-25

## 2024-10-25 NOTE — PROGRESS NOTES
Linda Hodgson (:  1995) is a 29 y.o. female,Established patient, here for evaluation of the following chief complaint(s):  Follow-up and Medication Check         Assessment & Plan  Class 3 severe obesity due to excess calories without serious comorbidity with body mass index (BMI) of 40.0 to 44.9 in adult    Chronic.  Uncontrolled.  On Wegovy 0.5.  Will increase to 1.0.  Will have patient follow-up in 60 days.    Orders:    Semaglutide-Weight Management (WEGOVY) 1 MG/0.5ML SOAJ SC injection; Inject 1 mg into the skin every 7 days    Vitamin D insufficiency    Chronic.  Stable.  On supplementation.  Will refill at this time as patient is due for refill.    Orders:    vitamin D (VITAMIN D3) 50 MCG ( UT) CAPS capsule; Take 1 capsule by mouth daily      No follow-ups on file.       Subjective   HPI  Patient is a 29-year-old female, who presents to clinic for interval follow-up for chronic disease management.    Diseases discussed today is chronic obesity and chronic vitamin D insufficiency.    In regard to chronic obesity, the patient is on Wegovy compounded 0.5 mg weekly.  She states this medication works well for her and she does not notice any side effects other than some constipation.  She is interested in increasing the dose at this time.  Patient has lost 6 pounds in the last 4 weeks.    In regard to chronic vitamin D insufficiency, the patient is on vitamin D supplementation.  She states this supplement works well for her and she does not notice any side effects of taking the medication.  She does request a refill of this medication at this time.      Review of Systems   All other systems reviewed and are negative.         Objective   Physical Exam  Vitals reviewed.   Constitutional:       General: She is not in acute distress.     Appearance: Normal appearance. She is not ill-appearing, toxic-appearing or diaphoretic.   HENT:      Mouth/Throat:      Mouth: Mucous membranes are moist.   Eyes:

## 2024-11-03 NOTE — PROGRESS NOTES
Gestational Diabetes Patient Assessment and Education    Name: Alondra Pedro : 1995  EDC:10/16/21        Education Completed  [x]  Definition of Gestational Diabetes                                                                                 [x]  Risk Factors/Acute Complications     [x]  Blood Glucose Monitoring--schedule, target levels     [x]  Exercise/Activity    [x]  Carbohydrate Counting  [x]  Timing of Meals    [x]  Food Logs  [x]  Eating Out  [x]  Nutrition Precautions for Pregnancy  [x]  Use of Blood glucose meter            Patient Goals  [x]  Follow meal plan with appropriate Carbohydrate intake:   Breakfast: 30 g Carb    Lunch: 30 - 45 g Carb   Dinner:  45 - 60 g Carb    3 snacks: 15 - 30 g Carb each  [x] Monitor blood glucose four times daily.     FBG Goal: 60 - 95 or as recommended by physician   1hr PP <140 or as recommended by physician   2 hr PP <120 or as recommended by physician  [x] Exercise    [x] Log glucose results and bring to physicians office at each scheduled appointment  [] Other:     Plan  [x] Return for follow-up visit to review Food/Log      Referring Provider: Phyllis Nuno    Total participants in [facility] 52 120 minutes
4 = No assist / stand by assistance

## 2024-11-15 ENCOUNTER — OFFICE VISIT (OUTPATIENT)
Dept: OBGYN CLINIC | Age: 29
End: 2024-11-15
Payer: COMMERCIAL

## 2024-11-15 VITALS
BODY MASS INDEX: 38.93 KG/M2 | OXYGEN SATURATION: 100 % | SYSTOLIC BLOOD PRESSURE: 122 MMHG | HEIGHT: 64 IN | DIASTOLIC BLOOD PRESSURE: 82 MMHG | WEIGHT: 228 LBS | HEART RATE: 90 BPM

## 2024-11-15 DIAGNOSIS — Z01.419 ENCOUNTER FOR ANNUAL ROUTINE GYNECOLOGICAL EXAMINATION: Primary | ICD-10-CM

## 2024-11-15 PROCEDURE — 99395 PREV VISIT EST AGE 18-39: CPT | Performed by: OBSTETRICS & GYNECOLOGY

## 2024-11-15 PROCEDURE — G8484 FLU IMMUNIZE NO ADMIN: HCPCS | Performed by: OBSTETRICS & GYNECOLOGY

## 2024-11-15 ASSESSMENT — ENCOUNTER SYMPTOMS
NAUSEA: 0
VOMITING: 0
ABDOMINAL PAIN: 0
DIARRHEA: 0
CONSTIPATION: 0
SHORTNESS OF BREATH: 0

## 2024-11-15 NOTE — PROGRESS NOTES
Annual Exam      CC:   Chief Complaint   Patient presents with    Gynecologic Exam     PAP and pelvic exam       HPI:  29 y.o.  presents for her gynecologic annual exam. No concerns/complaints today.    Patient seen and examined.     Review of Systems:   Review of Systems   Respiratory:  Negative for shortness of breath.    Cardiovascular:  Negative for chest pain.   Gastrointestinal:  Negative for abdominal pain, constipation, diarrhea, nausea and vomiting.   Genitourinary:  Negative for difficulty urinating, dysuria, menstrual problem, vaginal bleeding and vaginal discharge.   Neurological:  Negative for headaches.       Primary Care Physician: Nasir Juárez MD    Obstetric History  OB History    Para Term  AB Living   4 3 3   1 3   SAB IAB Ectopic Molar Multiple Live Births   1         3      # Outcome Date GA Lbr Stephon/2nd Weight Sex Type Anes PTL Lv   4 Term 10/08/21 38w2d  3.73 kg (8 lb 3.6 oz) M CS-LTranv Spinal N TIERNEY   3 SAB            2 Term 10/26/19 39w4d 33:31 / 01:32 5.085 kg (11 lb 3.4 oz) F Vag-Spont EPI N TIERNEY      Complications: Shoulder Dystocia   1 Term 03/17/15 40w0d 05:58 / 00:54 3.96 kg (8 lb 11.7 oz) F Vag-Spont EPI N TIERNEY       Gynecologic History  Menstrual History:  LMP: 22  Menstrual pattern: q28-30d, 7d, +cramps, moderate flow  Sexual History:  Contraception: s/p bilateral salpingectomy  Currently is sexually active  Denies history of STIs  No sexual problems  Pap History:  Last pap smear: 2023 NILM/HPV neg  History of abnormal pap smears: denies    Medical History:  Past Medical History:   Diagnosis Date    ADHD (attention deficit hyperactivity disorder)     Depression     history of [id number]    Fx one rib-open     LEFT SIDE    Headache     Herpes simplex virus (HSV) infection     Insulin controlled gestational diabetes mellitus (GDM) in third trimester 2021    Obesity     PTSD (post-traumatic stress disorder)     Sleep apnea        Surgical

## 2024-12-18 ENCOUNTER — OFFICE VISIT (OUTPATIENT)
Age: 29
End: 2024-12-18

## 2024-12-18 VITALS
TEMPERATURE: 98.5 F | DIASTOLIC BLOOD PRESSURE: 72 MMHG | HEART RATE: 103 BPM | RESPIRATION RATE: 18 BRPM | SYSTOLIC BLOOD PRESSURE: 112 MMHG | OXYGEN SATURATION: 98 %

## 2024-12-18 DIAGNOSIS — S69.91XA RIGHT WRIST INJURY, INITIAL ENCOUNTER: Primary | ICD-10-CM

## 2024-12-18 RX ORDER — IBUPROFEN 600 MG/1
600 TABLET, FILM COATED ORAL 3 TIMES DAILY PRN
Qty: 30 TABLET | Refills: 0 | Status: SHIPPED | OUTPATIENT
Start: 2024-12-18

## 2024-12-18 NOTE — PROGRESS NOTES
Linda Hodgson (: 1995) is a 29 y.o. female, Established patient, here for evaluation of the following chief complaint(s):  Wrist Injury (Pt states she was hit by a softball on her right wrist about 1 week ago, it bruised and went away, now the pain is worse doing normal activities)      ASSESSMENT/PLAN:    ICD-10-CM    1. Right wrist injury, initial encounter  S69.91XA             Discussed PCP follow up for persisting or worsening symptoms, or to return to the clinic if unable to obtain PCP follow up for worsening symptoms.    The patient tolerated their visit well. The patient and/or the family were informed of the results of any tests, a time was given to answer questions, a plan was proposed and they agreed with plan. Reviewed AVS with treatment instructions and answered questions - pt/family expresses understanding and agreement with the discussed treatment plan and AVS instructions.      SUBJECTIVE/OBJECTIVE:  HPI     Wrist Injury     Additional comments: Pt states she was hit by a softball on her right   wrist about 1 week ago, it bruised and went away, now the pain is worse   doing normal activities          Last edited by Estela Wood MA on 2024 10:45 AM.                VITAL SIGNS  Vitals:    24 1042   BP: 112/72   Pulse: (!) 103   Resp: 18   Temp: 98.5 °F (36.9 °C)   SpO2: 98%       Review of Systems  See HPI for pertinent positives and negatives.    Physical Exam  Musculoskeletal:      Comments: Right distal ulna and wrist area with mild bruising and tenderness to palpation.         PROCEDURES:  Unless otherwise noted below, none     Procedures        Xray Result (most recent):  XR WRIST RIGHT (MIN 3 VIEWS) 2024    Narrative  EXAMINATION:  3 XRAY VIEWS OF THE RIGHT WRIST    2024 11:56 am    COMPARISON:  None.    HISTORY:  ORDERING SYSTEM PROVIDED HISTORY: Right wrist injury, initial encounter  TECHNOLOGIST PROVIDED HISTORY:  Reason for exam:->Struck by softball in

## 2024-12-18 NOTE — PATIENT INSTRUCTIONS
Linda,    Thank you for trusting [facility] with your health care needs. Your decision to come to us means a lot, and we are honored to be part of your healthcare journey.  At [facility], our dedicated team is committed to providing you with the highest quality of care in a warm and welcoming environment. Your health and well-being are our top priorities, and we appreciate the opportunity to serve you.    Thank you for choosing us, and we’re here for you whenever you need us!    Warm regards,       [facility] Team    [] Dr. Gilliam [] JENNIE Gregory, Supervisor       [] JOSE E Yu    [] RT Taniya    [] JENNIE Fagan    [] JENNIE Jean Baptiste  [] JENNIE Weeks   [] JENNIE Jarquin    [] JENNIE Hickman

## 2024-12-20 ENCOUNTER — OFFICE VISIT (OUTPATIENT)
Dept: FAMILY MEDICINE CLINIC | Age: 29
End: 2024-12-20
Payer: COMMERCIAL

## 2024-12-20 VITALS
HEART RATE: 42 BPM | BODY MASS INDEX: 37.66 KG/M2 | SYSTOLIC BLOOD PRESSURE: 130 MMHG | DIASTOLIC BLOOD PRESSURE: 68 MMHG | OXYGEN SATURATION: 95 % | HEIGHT: 64 IN | WEIGHT: 220.6 LBS

## 2024-12-20 DIAGNOSIS — E66.01 CLASS 3 SEVERE OBESITY DUE TO EXCESS CALORIES WITHOUT SERIOUS COMORBIDITY WITH BODY MASS INDEX (BMI) OF 40.0 TO 44.9 IN ADULT: Primary | ICD-10-CM

## 2024-12-20 DIAGNOSIS — E66.813 CLASS 3 SEVERE OBESITY DUE TO EXCESS CALORIES WITHOUT SERIOUS COMORBIDITY WITH BODY MASS INDEX (BMI) OF 40.0 TO 44.9 IN ADULT: Primary | ICD-10-CM

## 2024-12-20 PROCEDURE — 99213 OFFICE O/P EST LOW 20 MIN: CPT | Performed by: STUDENT IN AN ORGANIZED HEALTH CARE EDUCATION/TRAINING PROGRAM

## 2024-12-20 PROCEDURE — 4004F PT TOBACCO SCREEN RCVD TLK: CPT | Performed by: STUDENT IN AN ORGANIZED HEALTH CARE EDUCATION/TRAINING PROGRAM

## 2024-12-20 PROCEDURE — G8427 DOCREV CUR MEDS BY ELIG CLIN: HCPCS | Performed by: STUDENT IN AN ORGANIZED HEALTH CARE EDUCATION/TRAINING PROGRAM

## 2024-12-20 PROCEDURE — G8417 CALC BMI ABV UP PARAM F/U: HCPCS | Performed by: STUDENT IN AN ORGANIZED HEALTH CARE EDUCATION/TRAINING PROGRAM

## 2024-12-20 PROCEDURE — G8484 FLU IMMUNIZE NO ADMIN: HCPCS | Performed by: STUDENT IN AN ORGANIZED HEALTH CARE EDUCATION/TRAINING PROGRAM

## 2024-12-20 NOTE — PROGRESS NOTES
Linda Hodgson (:  1995) is a 29 y.o. female,Established patient, here for evaluation of the following chief complaint(s):  Follow-up and Sinus Problem         Assessment & Plan  Class 3 severe obesity due to excess calories without serious comorbidity with body mass index (BMI) of 40.0 to 44.9 in adult    Chronic.  Improving.  On semaglutide.  Needs refill.  Will provide at this time.  Follow-up in 90 days.    Orders:    Semaglutide-Weight Management (WEGOVY) 1 MG/0.5ML SOAJ SC injection; Inject 1 mg into the skin every 7 days      Advised Ayr nasal saline gel in addition to intranasal corticosteroid and antihistamine for mucus production and dry nasal passages with bleeding.      No follow-ups on file.       Subjective   HPI  Patient is a 29-year-old female, who presents to clinic for interval follow-up for class III severe obesity.  Patient has been on semaglutide 1 mg weekly from a compounding pharmacy.  Patient states this medication has worked well for her and she has not noticed nausea or vomiting.  She has had some constipation, but states that using MiraLAX has been very helpful.  She is very pleased with this dose and does not think that she needs to be on a higher dose at this time.    Last Weight Metrics:      2024     8:57 AM 11/15/2024    10:13 AM 10/25/2024     8:49 AM 2024     3:27 PM 2024     9:47 AM 2024     8:52 AM 2023     1:49 PM   Weight Loss Metrics   Height 5' 4\" 5' 4\" 5' 4\" 5' 4\" 5' 4\" 5' 4\" 5' 4\"   Weight - Scale 220 lbs 10 oz 228 lbs 231 lbs 10 oz 237 lbs 3 oz 242 lbs 13 oz 241 lbs 10 oz 240 lbs 2 oz   BMI (Calculated) 37.9 kg/m2 39.2 kg/m2 39.8 kg/m2 40.8 kg/m2 41.8 kg/m2 41.6 kg/m2 41.3 kg/m2      Patient has also had some mucus production and some nosebleeds after blowing her nose very hard.  Patient states that she has not been on any medications and is wondering what her recommendations are.      Review of Systems   All other systems reviewed and

## 2025-03-21 ENCOUNTER — OFFICE VISIT (OUTPATIENT)
Dept: FAMILY MEDICINE CLINIC | Age: 30
End: 2025-03-21
Payer: COMMERCIAL

## 2025-03-21 VITALS
WEIGHT: 197.6 LBS | DIASTOLIC BLOOD PRESSURE: 70 MMHG | HEART RATE: 88 BPM | BODY MASS INDEX: 33.73 KG/M2 | SYSTOLIC BLOOD PRESSURE: 134 MMHG | OXYGEN SATURATION: 99 % | HEIGHT: 64 IN

## 2025-03-21 DIAGNOSIS — F90.0 ATTENTION DEFICIT HYPERACTIVITY DISORDER (ADHD), PREDOMINANTLY INATTENTIVE TYPE: ICD-10-CM

## 2025-03-21 DIAGNOSIS — E66.01 CLASS 3 SEVERE OBESITY DUE TO EXCESS CALORIES WITHOUT SERIOUS COMORBIDITY WITH BODY MASS INDEX (BMI) OF 40.0 TO 44.9 IN ADULT: Primary | ICD-10-CM

## 2025-03-21 DIAGNOSIS — E66.813 CLASS 3 SEVERE OBESITY DUE TO EXCESS CALORIES WITHOUT SERIOUS COMORBIDITY WITH BODY MASS INDEX (BMI) OF 40.0 TO 44.9 IN ADULT: Primary | ICD-10-CM

## 2025-03-21 PROCEDURE — 99214 OFFICE O/P EST MOD 30 MIN: CPT | Performed by: STUDENT IN AN ORGANIZED HEALTH CARE EDUCATION/TRAINING PROGRAM

## 2025-03-21 RX ORDER — DEXTROAMPHETAMINE SACCHARATE, AMPHETAMINE ASPARTATE, DEXTROAMPHETAMINE SULFATE AND AMPHETAMINE SULFATE 1.25; 1.25; 1.25; 1.25 MG/1; MG/1; MG/1; MG/1
5 TABLET ORAL 2 TIMES DAILY
Qty: 60 TABLET | Refills: 0 | Status: SHIPPED | OUTPATIENT
Start: 2025-03-21 | End: 2025-04-20

## 2025-03-21 SDOH — ECONOMIC STABILITY: FOOD INSECURITY: WITHIN THE PAST 12 MONTHS, THE FOOD YOU BOUGHT JUST DIDN'T LAST AND YOU DIDN'T HAVE MONEY TO GET MORE.: NEVER TRUE

## 2025-03-21 SDOH — ECONOMIC STABILITY: FOOD INSECURITY: WITHIN THE PAST 12 MONTHS, YOU WORRIED THAT YOUR FOOD WOULD RUN OUT BEFORE YOU GOT MONEY TO BUY MORE.: NEVER TRUE

## 2025-03-21 ASSESSMENT — PATIENT HEALTH QUESTIONNAIRE - PHQ9
SUM OF ALL RESPONSES TO PHQ QUESTIONS 1-9: 3
2. FEELING DOWN, DEPRESSED OR HOPELESS: NOT AT ALL
SUM OF ALL RESPONSES TO PHQ QUESTIONS 1-9: 3
SUM OF ALL RESPONSES TO PHQ QUESTIONS 1-9: 3
7. TROUBLE CONCENTRATING ON THINGS, SUCH AS READING THE NEWSPAPER OR WATCHING TELEVISION: SEVERAL DAYS
3. TROUBLE FALLING OR STAYING ASLEEP: SEVERAL DAYS
6. FEELING BAD ABOUT YOURSELF - OR THAT YOU ARE A FAILURE OR HAVE LET YOURSELF OR YOUR FAMILY DOWN: NOT AT ALL
4. FEELING TIRED OR HAVING LITTLE ENERGY: SEVERAL DAYS
SUM OF ALL RESPONSES TO PHQ QUESTIONS 1-9: 3
5. POOR APPETITE OR OVEREATING: NOT AT ALL
1. LITTLE INTEREST OR PLEASURE IN DOING THINGS: NOT AT ALL
8. MOVING OR SPEAKING SO SLOWLY THAT OTHER PEOPLE COULD HAVE NOTICED. OR THE OPPOSITE, BEING SO FIGETY OR RESTLESS THAT YOU HAVE BEEN MOVING AROUND A LOT MORE THAN USUAL: NOT AT ALL
10. IF YOU CHECKED OFF ANY PROBLEMS, HOW DIFFICULT HAVE THESE PROBLEMS MADE IT FOR YOU TO DO YOUR WORK, TAKE CARE OF THINGS AT HOME, OR GET ALONG WITH OTHER PEOPLE: NOT DIFFICULT AT ALL
9. THOUGHTS THAT YOU WOULD BE BETTER OFF DEAD, OR OF HURTING YOURSELF: NOT AT ALL

## 2025-03-21 NOTE — PROGRESS NOTES
negative.         Objective   Vitals:    03/21/25 0853   BP: 134/70   Pulse: 88   SpO2: 99%   Weight: 89.6 kg (197 lb 9.6 oz)   Height: 1.626 m (5' 4\")       Physical Exam  Vitals reviewed.   Constitutional:       General: She is not in acute distress.     Appearance: Normal appearance. She is not ill-appearing, toxic-appearing or diaphoretic.   HENT:      Mouth/Throat:      Mouth: Mucous membranes are moist.   Eyes:      General: No scleral icterus.     Conjunctiva/sclera: Conjunctivae normal.   Cardiovascular:      Rate and Rhythm: Normal rate.   Pulmonary:      Effort: Pulmonary effort is normal.   Abdominal:      General: There is no distension.   Skin:     General: Skin is dry.      Coloration: Skin is not jaundiced or pale.   Neurological:      Mental Status: She is alert. Mental status is at baseline.      Gait: Gait normal.   Psychiatric:         Behavior: Behavior normal.         Thought Content: Thought content normal.            On this date 3/21/2025 I have spent 31 minutes reviewing previous notes, test results and face to face with the patient discussing the diagnosis and importance of compliance with the treatment plan as well as documenting on the day of the visit.      An electronic signature was used to authenticate this note.    --Nasir Juárez MD

## 2025-03-21 NOTE — ASSESSMENT & PLAN NOTE
Chronic.  Only partially controlled.  On Strattera.  Would like to switch to Adderall.  Will start Adderall 5 mg 2 times per day.  Follow-up in 30 days.    Orders:    amphetamine-dextroamphetamine (ADDERALL, 5MG,) 5 MG tablet; Take 1 tablet by mouth 2 times daily for 30 days. Max Daily Amount: 10 mg

## 2025-04-29 ENCOUNTER — OFFICE VISIT (OUTPATIENT)
Dept: FAMILY MEDICINE CLINIC | Age: 30
End: 2025-04-29
Payer: COMMERCIAL

## 2025-04-29 VITALS
BODY MASS INDEX: 31.92 KG/M2 | OXYGEN SATURATION: 98 % | WEIGHT: 187 LBS | HEIGHT: 64 IN | DIASTOLIC BLOOD PRESSURE: 60 MMHG | SYSTOLIC BLOOD PRESSURE: 122 MMHG | HEART RATE: 95 BPM

## 2025-04-29 DIAGNOSIS — F90.0 ATTENTION DEFICIT HYPERACTIVITY DISORDER (ADHD), PREDOMINANTLY INATTENTIVE TYPE: ICD-10-CM

## 2025-04-29 DIAGNOSIS — E66.813 CLASS 3 SEVERE OBESITY DUE TO EXCESS CALORIES WITHOUT SERIOUS COMORBIDITY WITH BODY MASS INDEX (BMI) OF 40.0 TO 44.9 IN ADULT (HCC): Primary | ICD-10-CM

## 2025-04-29 PROCEDURE — 99214 OFFICE O/P EST MOD 30 MIN: CPT | Performed by: STUDENT IN AN ORGANIZED HEALTH CARE EDUCATION/TRAINING PROGRAM

## 2025-04-29 RX ORDER — DEXTROAMPHETAMINE SACCHARATE, AMPHETAMINE ASPARTATE, DEXTROAMPHETAMINE SULFATE AND AMPHETAMINE SULFATE 1.25; 1.25; 1.25; 1.25 MG/1; MG/1; MG/1; MG/1
5 TABLET ORAL 2 TIMES DAILY
Qty: 60 TABLET | Refills: 0 | Status: SHIPPED | OUTPATIENT
Start: 2025-04-29 | End: 2025-05-29

## 2025-04-29 RX ORDER — NORETHINDRONE 5 MG/1
5 TABLET ORAL DAILY
COMMUNITY
Start: 2025-04-19

## 2025-04-29 NOTE — PROGRESS NOTES
Linda Hodgson (: 1995) is a 30 y.o. female is here for evaluation of the following chief complaint(s): Follow-up and Medication Check    Assessment/Plan:     Assessment & Plan  Attention deficit hyperactivity disorder (ADHD), predominantly inattentive type  Chronic.  Stable.  On Adderall.  Needs refill.  Will provide at this time.    Orders:    amphetamine-dextroamphetamine (ADDERALL, 5MG,) 5 MG tablet; Take 1 tablet by mouth 2 times daily for 30 days. Max Daily Amount: 10 mg    Class 3 severe obesity due to excess calories without serious comorbidity with body mass index (BMI) of 40.0 to 44.9 in adult (HCC)  Chronic.  Improving.  On Wegovy.  Does not need a refill.  Continue at this time.         No follow-ups on file.  Subjective/Objective:   In  regard to ADHD: Since last visit: Patient notices it lasts approximately 6 hours, usually takes the first dose at about 9am. Plans to start taking that dose earlier in the day and then taking second dosing before 3pm.  Medication compliance: all of the time.  Side effects from medication include: none.   has been reviewed.    In regard to Chronic Obesity: Patient is on Wegovy.  She states this medication works well for her and she is not reporting any side effects.  She does not request a refill of this medication at this time.          /60   Pulse 95   Ht 1.626 m (5' 4\")   Wt 84.8 kg (187 lb)   SpO2 98%   BMI 32.10 kg/m²       An electronic signature was used to authenticate this note.  -- Nasir Juárez MD

## 2025-04-29 NOTE — ASSESSMENT & PLAN NOTE
Chronic.  Stable.  On Adderall.  Needs refill.  Will provide at this time.    Orders:    amphetamine-dextroamphetamine (ADDERALL, 5MG,) 5 MG tablet; Take 1 tablet by mouth 2 times daily for 30 days. Max Daily Amount: 10 mg

## 2025-07-08 DIAGNOSIS — E66.813 CLASS 3 SEVERE OBESITY DUE TO EXCESS CALORIES WITHOUT SERIOUS COMORBIDITY WITH BODY MASS INDEX (BMI) OF 40.0 TO 44.9 IN ADULT (HCC): ICD-10-CM

## 2025-07-08 DIAGNOSIS — F90.0 ATTENTION DEFICIT HYPERACTIVITY DISORDER (ADHD), PREDOMINANTLY INATTENTIVE TYPE: ICD-10-CM

## 2025-07-08 NOTE — TELEPHONE ENCOUNTER
Last Office Visit  -  04/29/2025  Next Office Visit  -  07/29/2025    Last Filled  -    Last UDS -    Contract -

## 2025-07-29 ENCOUNTER — OFFICE VISIT (OUTPATIENT)
Dept: FAMILY MEDICINE CLINIC | Age: 30
End: 2025-07-29
Payer: COMMERCIAL

## 2025-07-29 VITALS
WEIGHT: 164 LBS | HEART RATE: 68 BPM | SYSTOLIC BLOOD PRESSURE: 118 MMHG | DIASTOLIC BLOOD PRESSURE: 60 MMHG | BODY MASS INDEX: 28 KG/M2 | OXYGEN SATURATION: 98 % | HEIGHT: 64 IN

## 2025-07-29 DIAGNOSIS — E66.813 CLASS 3 SEVERE OBESITY DUE TO EXCESS CALORIES WITHOUT SERIOUS COMORBIDITY WITH BODY MASS INDEX (BMI) OF 40.0 TO 44.9 IN ADULT (HCC): ICD-10-CM

## 2025-07-29 DIAGNOSIS — E55.9 VITAMIN D INSUFFICIENCY: ICD-10-CM

## 2025-07-29 DIAGNOSIS — F90.0 ATTENTION DEFICIT HYPERACTIVITY DISORDER (ADHD), PREDOMINANTLY INATTENTIVE TYPE: Primary | ICD-10-CM

## 2025-07-29 PROCEDURE — 99214 OFFICE O/P EST MOD 30 MIN: CPT | Performed by: STUDENT IN AN ORGANIZED HEALTH CARE EDUCATION/TRAINING PROGRAM

## 2025-07-29 RX ORDER — DEXTROAMPHETAMINE SACCHARATE, AMPHETAMINE ASPARTATE, DEXTROAMPHETAMINE SULFATE AND AMPHETAMINE SULFATE 1.25; 1.25; 1.25; 1.25 MG/1; MG/1; MG/1; MG/1
5 TABLET ORAL 2 TIMES DAILY
Qty: 60 TABLET | Refills: 0 | Status: SHIPPED | OUTPATIENT
Start: 2025-07-29 | End: 2025-08-28

## 2025-07-29 NOTE — PROGRESS NOTES
Linda Hodgson (: 1995) is a 30 y.o. female is here for evaluation of the following chief complaint(s): Follow-up and ADHD    Assessment/Plan:     Assessment & Plan  Attention deficit hyperactivity disorder (ADHD), predominantly inattentive type   Chronic, at goal (stable), continue current treatment plan    Orders:    amphetamine-dextroamphetamine (ADDERALL, 5MG,) 5 MG tablet; Take 1 tablet by mouth 2 times daily for 30 days. Max Daily Amount: 10 mg    Class 3 severe obesity due to excess calories without serious comorbidity with body mass index (BMI) of 40.0 to 44.9 in adult (HCC)   Chronic, at goal (stable), continue current treatment plan         Vitamin D insufficiency   Chronic, at goal (stable), continue current treatment plan         No follow-ups on file.  Subjective/Objective:   In regard to ADHD, there has been no changes.  The patient takes her medication once daily all of the time.  Often will miss the second dose because she takes the first dose too late in the day.  Does not have any side effects.  Denies any hypertension, dizziness, palpitation, insomnia.  PDMP has been reviewed.    In regard to chronic obesity, the patient is on semaglutide.  She is on 1.4 mg once weekly.  She states this medication works well for her and she does not report any side effects.  She does not request refill this medication at this time.    Patient has chronic vitamin D deficiency.  She is on supplementation for this.  This medication works well for her and she does not report any side effects.  She does not quest refill of this medication at this time.        /60   Pulse 68   Ht 1.626 m (5' 4\")   Wt 74.4 kg (164 lb)   SpO2 98%   BMI 28.15 kg/m²       An electronic signature was used to authenticate this note.  -- Nasir Juárez MD

## (undated) DEVICE — PAD,NON-ADHERENT,3X8,STERILE,LF,1/PK: Brand: MEDLINE

## (undated) DEVICE — SUTURE VCRL 2-0 XLH 27IN ABSRB BRAID VLT J581G

## (undated) DEVICE — SUTURE ABSORBABLE BRAIDED 2-0 CT-1 27 IN UD VICRYL J259H

## (undated) DEVICE — SOLUTION IV IRRIG POUR BRL 0.9% SODIUM CHL 2F7124

## (undated) DEVICE — CHLORAPREP 26ML ORANGE

## (undated) DEVICE — BLADE CLIPPER GEN PURP NS

## (undated) DEVICE — GLOVE SURG SZ 65 THK91MIL LTX FREE SYN POLYISOPRENE

## (undated) DEVICE — GARMENT COMPR L FOR 23IN CALF FLOTRN

## (undated) DEVICE — SUTURE MCRYL SZ 3-0 L27IN ABSRB UD L60MM KS STR REV CUT Y523H

## (undated) DEVICE — SUTURE VCRL SZ 3-0 L36IN ABSRB UD L36MM CT-1 1/2 CIR J944H

## (undated) DEVICE — SUTURE VCRL SZ 0 L36IN ABSRB UD L36MM CT-1 1/2 CIR J946H

## (undated) DEVICE — DRESSING COMP IS W4XL10IN PD W2XL8IN CNTCT LAYR ADH

## (undated) DEVICE — Device

## (undated) DEVICE — TRAY URIN CATH 16FR DRNGE BG STATLOK STBL DEV F SURSTP

## (undated) DEVICE — 3M™ STERI-STRIP™ COMPOUND BENZOIN TINCTURE 40 BAGS/CARTON 4 CARTONS/CASE C1544: Brand: 3M™ STERI-STRIP™

## (undated) DEVICE — S/USE RESUS KIT W/O MASK (10): Brand: FISHER & PAYKEL HEALTHCARE